# Patient Record
Sex: FEMALE | Race: WHITE | NOT HISPANIC OR LATINO | Employment: FULL TIME | URBAN - METROPOLITAN AREA
[De-identification: names, ages, dates, MRNs, and addresses within clinical notes are randomized per-mention and may not be internally consistent; named-entity substitution may affect disease eponyms.]

---

## 2017-01-27 ENCOUNTER — OFFICE VISIT (OUTPATIENT)
Dept: PEDIATRICS | Facility: CLINIC | Age: 42
End: 2017-01-27
Payer: COMMERCIAL

## 2017-01-27 VITALS
BODY MASS INDEX: 33.97 KG/M2 | OXYGEN SATURATION: 97 % | TEMPERATURE: 98.9 F | RESPIRATION RATE: 16 BRPM | SYSTOLIC BLOOD PRESSURE: 122 MMHG | HEART RATE: 85 BPM | WEIGHT: 228.3 LBS | DIASTOLIC BLOOD PRESSURE: 80 MMHG

## 2017-01-27 DIAGNOSIS — N89.8 VAGINAL DISCHARGE: ICD-10-CM

## 2017-01-27 DIAGNOSIS — B37.31 YEAST VAGINITIS: ICD-10-CM

## 2017-01-27 DIAGNOSIS — G43.709 CHRONIC MIGRAINE WITHOUT AURA WITHOUT STATUS MIGRAINOSUS, NOT INTRACTABLE: ICD-10-CM

## 2017-01-27 DIAGNOSIS — J45.20 INTERMITTENT ASTHMA, UNCOMPLICATED: ICD-10-CM

## 2017-01-27 DIAGNOSIS — F33.1 MODERATE RECURRENT MAJOR DEPRESSION (H): ICD-10-CM

## 2017-01-27 DIAGNOSIS — N89.8 VAGINAL ITCHING: Primary | ICD-10-CM

## 2017-01-27 LAB
MICRO REPORT STATUS: ABNORMAL
SPECIMEN SOURCE: ABNORMAL
WET PREP SPEC: ABNORMAL

## 2017-01-27 PROCEDURE — 99214 OFFICE O/P EST MOD 30 MIN: CPT | Performed by: FAMILY MEDICINE

## 2017-01-27 PROCEDURE — 87210 SMEAR WET MOUNT SALINE/INK: CPT | Performed by: FAMILY MEDICINE

## 2017-01-27 RX ORDER — VENLAFAXINE HYDROCHLORIDE 150 MG/1
150 CAPSULE, EXTENDED RELEASE ORAL DAILY
Qty: 90 CAPSULE | Refills: 1 | Status: SHIPPED | OUTPATIENT
Start: 2017-01-27 | End: 2018-01-05

## 2017-01-27 RX ORDER — FLUCONAZOLE 150 MG/1
150 TABLET ORAL
Qty: 4 TABLET | Refills: 0 | Status: SHIPPED | OUTPATIENT
Start: 2017-01-27 | End: 2018-02-23

## 2017-01-27 RX ORDER — RIZATRIPTAN BENZOATE 10 MG/1
10 TABLET, ORALLY DISINTEGRATING ORAL SEE ADMIN INSTRUCTIONS
Qty: 6 TABLET | Refills: 6 | Status: SHIPPED
Start: 2017-01-27 | End: 2018-02-23

## 2017-01-27 RX ORDER — PROPRANOLOL HCL 60 MG
60 CAPSULE, EXTENDED RELEASE 24HR ORAL DAILY
Qty: 90 CAPSULE | Refills: 1 | Status: SHIPPED | OUTPATIENT
Start: 2017-01-27 | End: 2018-02-23

## 2017-01-27 RX ORDER — ALBUTEROL SULFATE 90 UG/1
AEROSOL, METERED RESPIRATORY (INHALATION)
Qty: 1 INHALER | Refills: 1 | Status: SHIPPED | OUTPATIENT
Start: 2017-01-27 | End: 2018-02-23

## 2017-01-27 ASSESSMENT — PAIN SCALES - GENERAL: PAINLEVEL: NO PAIN (1)

## 2017-01-27 NOTE — PROGRESS NOTES
SUBJECTIVE:                                                    Treasure Pradhan is a 41 year old female who presents to clinic today for the following health issues:      Vaginal Symptoms     Onset: 4 weeks, recurrence x1 week    Description:  Vaginal Discharge: thick and yellowish   Itching (Pruritis): YES  Burning sensation:  YES  Odor: no     Accompanying Signs & Symptoms:  Pain with Urination: YES- sometimes  Abdominal Pain: no - but notes low back feeling achy  Fever: no    History:   Sexually active: YES  New Partner: no   Possibility of Pregnancy:  No    Precipitating factors:   Recent Antibiotic Use: no     Alleviating factors:  Monistat 3 day course took symptoms away and had recurrence so completed 2nd 3 day treatment. Patient c/o onset of symptoms for the 3rd time about 5 days ago     Therapies Tried and outcome: Monistat 3 day x2 (about 1 week apart)      Depression and Anxiety Follow-Up    Status since last visit: No change    Other associated symptoms:None    Complicating factors:     Significant life event: No     Current substance abuse: None    PHQ-9 SCORE 6/3/2015 12/9/2015 7/15/2016   Total Score 9 - -   Total Score - 13 13     NATANAEL-7 SCORE 6/3/2015 12/9/2015 7/15/2016   Total Score 12 - -   Total Score - 7 5        PHQ-9  English      PHQ-9   Any Language     GAD7       Asthma Follow-Up    Was ACT completed today?    Yes    ACT Total Scores 7/15/2016   ACT TOTAL SCORE -   ASTHMA ER VISITS -   ASTHMA HOSPITALIZATIONS -   ACT TOTAL SCORE (Goal Greater than or Equal to 20) 25   In the past 12 months, how many times did you visit the emergency room for your asthma without being admitted to the hospital? 0   In the past 12 months, how many times were you hospitalized overnight because of your asthma? 0       Migraine Follow-Up    Headaches symptoms:  Stable     Frequency: 1-2/month     Duration of headaches: hours- day    Able to do normal daily activities/work with migraines: Yes    Rescue/Relief  medication:Maxalt              Effectiveness: moderate relief    Preventative medication: inderal    Neurologic complications: No new stroke-like symptoms, loss of vision or speech, numbness or weakness    In the past 4 weeks, how often have you gone to Urgent Care or the emergency room because of your headaches?  0       Problem list and histories reviewed & adjusted, as indicated.  Additional history: as documented    Patient Active Problem List   Diagnosis     Migraine     Depression with anxiety     Asthma, intermittent     HYPERLIPIDEMIA LDL GOAL <160     Anal pain     Back pain     Moderate recurrent major depression (H)     Generalized anxiety disorder     Obesity (BMI 30-39.9)     Dyslipidemia     Onychomycosis     Therapeutic drug monitoring     Degeneration of thoracic or thoracolumbar intervertebral disc     DDD (degenerative disc disease), cervical     Migraine without aura and without status migrainosus, not intractable     Encounter for surveillance of other contraceptive     Pigmented skin lesion, midback, 3mm     Past Surgical History   Procedure Laterality Date     Surgical history of -   2007          Surgical history of -   3/2006     Rt Foot mass - Perineurioma       Social History   Substance Use Topics     Smoking status: Never Smoker      Smokeless tobacco: Never Used     Alcohol Use: Yes      Comment: 3-4 drinks per month     Family History   Problem Relation Age of Onset     HEART DISEASE Father      Prostate Cancer Father      DIABETES Maternal Grandfather      HEART DISEASE Paternal Grandfather      Genetic Disorder Daughter      At Birth/ [place]         Current Outpatient Prescriptions   Medication Sig Dispense Refill     fluconazole (DIFLUCAN) 150 MG tablet Take 1 tablet (150 mg) by mouth every 3 days 4 tablet 0     venlafaxine (EFFEXOR-XR) 150 MG 24 hr capsule Take 1 capsule (150 mg) by mouth daily 90 capsule 1     propranolol (INDERAL LA) 60 MG 24 hr capsule Take 1  capsule (60 mg) by mouth daily 90 capsule 1     rizatriptan (MAXALT-MLT) 10 MG ODT tab Take 1 tablet (10 mg) by mouth See Admin Instructions WITH ONSET OF MIGRAINE, MAY REPEAT ONCE AFTER 2 HOURS. DO NOT EXCEED 3 TABLETS IN 24 HOURS. 6 tablet 6     albuterol (ALBUTEROL) 108 (90 BASE) MCG/ACT Inhaler 2 puffs every 4 hours as needed. 1 Inhaler 1     etonogestrel-ethinyl estradiol (NUVARING) 0.12-0.015 MG/24HR vaginal ring PLACE 1 RING VAGINALLY EVERY 21 DAYS THEN REMOVE FOR 1 WEEK. THEN REPEAT. 3 each 3     cyclobenzaprine (FLEXERIL) 10 MG tablet Take 1 tablet (10 mg) by mouth 2 times daily as needed for muscle spasms 30 tablet 1     fluticasone (FLONASE) 50 MCG/ACT nasal spray Spray 2 sprays into both nostrils daily 48 g 3     nystatin (MYCOSTATIN) 476293 UNIT/GM POWD Use to squirt inside the socks once daily 60 g 3     [DISCONTINUED] venlafaxine (EFFEXOR-XR) 150 MG 24 hr capsule Take 1 capsule (150 mg) by mouth daily 90 capsule 3     [DISCONTINUED] propranolol (INDERAL LA) 60 MG capsule Take 1 capsule (60 mg) by mouth daily 90 capsule 3     [DISCONTINUED] albuterol (ALBUTEROL) 108 (90 BASE) MCG/ACT inhaler 2 puffs every 4 hours as needed. 1 Inhaler 1     Allergies   Allergen Reactions     Terbinafine Itching     Recent Labs   Lab Test  03/03/14   1004  04/24/12   1101  01/03/11   1011   LDL  146*  145*   --    HDL  46*  43*   --    TRIG  96  63   --    ALT  33   --    --    CR   --   0.69   --    GFRESTIMATED   --   >90   --    GFRESTBLACK   --   >90   --    POTASSIUM   --   4.1   --    TSH   --   0.64  1.21      BP Readings from Last 3 Encounters:   01/27/17 122/80   07/15/16 130/80   12/09/15 129/89    Wt Readings from Last 3 Encounters:   01/27/17 228 lb 4.8 oz (103.556 kg)   07/15/16 225 lb 14.4 oz (102.468 kg)   12/09/15 237 lb 11.2 oz (107.82 kg)                  Labs reviewed in EPIC  Problem list, Medication list, Allergies, and Medical/Social/Surgical histories reviewed in EPIC and updated as  appropriate.    ROS:  C: NEGATIVE for fever, chills, change in weight  INTEGUMENTARY/SKIN: NEGATIVE for worrisome rashes, moles or lesions  EYES: NEGATIVE for vision changes or irritation  R: NEGATIVE for significant cough or SOB  RESP:Hx asthma  CV: NEGATIVE for chest pain, palpitations or peripheral edema  GI: NEGATIVE for nausea, abdominal pain, heartburn, or change in bowel habits  : as above  MUSCULOSKELETAL: NEGATIVE for significant arthralgias or myalgia  NEURO: NEGATIVE for weakness, dizziness or paresthesias and Hx headaches-migraine  ENDOCRINE: NEGATIVE for temperature intolerance, skin/hair changes  HEME/ALLERGY/IMMUNE: NEGATIVE for bleeding problems  PSYCHIATRIC: NEGATIVE for changes in mood or affect and HX depression    OBJECTIVE:                                                    /80 mmHg  Pulse 85  Temp(Src) 98.9  F (37.2  C) (Oral)  Resp 16  Wt 228 lb 4.8 oz (103.556 kg)  SpO2 97%  LMP 01/19/2017  Body mass index is 33.97 kg/(m^2).  GENERAL: healthy, alert and no distress  NECK: no adenopathy, no asymmetry, masses, or scars and thyroid normal to palpation  RESP: lungs clear to auscultation - no rales, rhonchi or wheezes  CV: regular rate and rhythm, normal S1 S2, no S3 or S4, no murmur, click or rub, no peripheral edema and peripheral pulses strong  ABDOMEN: soft, nontender, no hepatosplenomegaly, no masses and bowel sounds normal   (female): normal female external genitalia, normal urethral meatus , vaginal mucosa pink, moist, well rugated, vaginal discharge - moderate and white and normal cervix, adnexae, and uterus without masses.  MS: no gross musculoskeletal defects noted, no edema  NEURO: Normal strength and tone, mentation intact and speech normal  PSYCH: mentation appears normal, affect normal/bright    Diagnostic Test Results:  Results for orders placed or performed in visit on 01/27/17 (from the past 24 hour(s))   Wet prep   Result Value Ref Range    Specimen Description  "Vagina     Wet Prep (A)      No Trichomonas seen  No clue cells seen  Yeast seen      Micro Report Status FINAL 01/27/2017         ASSESSMENT/PLAN:                                                    Depression; recurrent episode-- Moderate   Associated with the following complications:    None   Plan:  No changes in the patient's current treatment plan    Asthma: Intermittent   Plan:  No changes in the patient's current treatment plan    Migraine: controlled   Plan:  No changes in the patient's current treatment plan      BMI:   Estimated body mass index is 33.97 kg/(m^2) as calculated from the following:    Height as of 7/15/16: 5' 8.75\" (1.746 m).    Weight as of this encounter: 228 lb 4.8 oz (103.556 kg).   Weight management plan: Discussed healthy diet and exercise guidelines and patient will follow up in 6 months in clinic to re-evaluate.      1. Vaginal itching    - Wet prep    2. Vaginal discharge    - Wet prep    3. Yeast vaginitis  Results for orders placed or performed in visit on 01/27/17   Wet prep   Result Value Ref Range    Specimen Description Vagina     Wet Prep (A)      No Trichomonas seen  No clue cells seen  Yeast seen      Micro Report Status FINAL 01/27/2017      -Test results reviewed with patient  Given the recent recurrences, recommended to take Diflucan 150 mg every 3 days for 4 doses  Reviewed local hygiene  Follow-up if no better in 2-3 weeks or sooner if needed  - fluconazole (DIFLUCAN) 150 MG tablet; Take 1 tablet (150 mg) by mouth every 3 days  Dispense: 4 tablet; Refill: 0    4. Moderate recurrent major depression (H)  Stable, continue Effexor 150 mg daily, recheck in 6 months at the time of physical  - venlafaxine (EFFEXOR-XR) 150 MG 24 hr capsule; Take 1 capsule (150 mg) by mouth daily  Dispense: 90 capsule; Refill: 1    5. Chronic migraine without aura without status migrainosus, not intractable  Stable, continue Maxalt p.r.n. For episodic migraine, continue propranolol 60 mm daily " for prophylaxis, recheck in 6 months or sooner if needed  - propranolol (INDERAL LA) 60 MG 24 hr capsule; Take 1 capsule (60 mg) by mouth daily  Dispense: 90 capsule; Refill: 1  - rizatriptan (MAXALT-MLT) 10 MG ODT tab; Take 1 tablet (10 mg) by mouth See Admin Instructions WITH ONSET OF MIGRAINE, MAY REPEAT ONCE AFTER 2 HOURS. DO NOT EXCEED 3 TABLETS IN 24 HOURS.  Dispense: 6 tablet; Refill: 6    6. Intermittent asthma, uncomplicated   stable, continue albuterol inhaler p.r.n.  - albuterol (ALBUTEROL) 108 (90 BASE) MCG/ACT Inhaler; 2 puffs every 4 hours as needed.  Dispense: 1 Inhaler; Refill: 1    Work on weight loss  Regular exercise  Chart documentation done in part with Dragon Voice recognition Software. Although reviewed after completion, some word and grammatical error may remain.    See Patient Instructions    Nelida Celestin MD  [facility]

## 2017-01-27 NOTE — NURSING NOTE
"Chief Complaint   Patient presents with     Vaginal Problem       Initial /90 mmHg  Pulse 85  Temp(Src) 98.9  F (37.2  C) (Oral)  Resp 16  Wt 228 lb 4.8 oz (103.556 kg)  SpO2 97%  LMP 01/19/2017 Estimated body mass index is 33.97 kg/(m^2) as calculated from the following:    Height as of 7/15/16: 5' 8.75\" (1.746 m).    Weight as of this encounter: 228 lb 4.8 oz (103.556 kg).  BP completed using cuff size: vinod Valdovinos, CMA      "

## 2017-01-27 NOTE — MR AVS SNAPSHOT
After Visit Summary   1/27/2017    Treasure Pradhan    MRN: [id number]           Patient Information     Date Of Birth          1975        Visit Information        Provider Department      1/27/2017 2:20 PM Nelida Celestin MD [facility]        Today's Diagnoses     Vaginal itching    -  1     Vaginal discharge         Yeast vaginitis         Moderate recurrent major depression (H)         Chronic migraine without aura without status migrainosus, not intractable         Intermittent asthma, uncomplicated           Care Instructions    Schedule for physical, fasting labs in 6 months  Take diflucan for four doses        Follow-ups after your visit        Who to contact     If you have questions or need follow up information about today's clinic visit or your schedule please contact [facility] directly at [phone].  Normal or non-critical lab and imaging results will be communicated to you by MyChart, letter or phone within 4 business days after the clinic has received the results. If you do not hear from us within 7 days, please contact the clinic through MyChart or phone. If you have a critical or abnormal lab result, we will notify you by phone as soon as possible.  Submit refill requests through J[employer]a or call your pharmacy and they will forward the refill request to us. Please allow 3 business days for your refill to be completed.          Additional Information About Your Visit        MyChart Information     Joota is an electronic gateway that provides easy, online access to your medical records. With Joota, you can request a clinic appointment, read your test results, renew a prescription or communicate with your care team.     To sign up for Joota visit the website at [email or web address]   You will be asked to enter the access code listed below, as well as some personal information. Please follow the directions to create your  username and password.     Your access code is: [email or web address]  Expires: 2017  2:41 PM     Your access code will  in 90 days. If you need help or a new code, please contact your [facility] or call [phone] for assistance.        Care EveryWhere ID     This is your Care EveryWhere ID. This could be used by other organizations to access your [place] medical records  [id number]        Your Vitals Were     Pulse Temperature Respirations Pulse Oximetry Last Period       85 98.9  F (37.2  C) (Oral) 16 97% 2017        Blood Pressure from Last 3 Encounters:   17 122/80   07/15/16 130/80   12/09/15 129/89    Weight from Last 3 Encounters:   17 228 lb 4.8 oz (103.556 kg)   07/15/16 225 lb 14.4 oz (102.468 kg)   12/09/15 237 lb 11.2 oz (107.82 kg)              We Performed the Following     Wet prep          Today's Medication Changes          These changes are accurate as of: 17  2:41 PM.  If you have any questions, ask your nurse or doctor.               Start taking these medicines.        Dose/Directions    fluconazole 150 MG tablet   Commonly known as:  DIFLUCAN   Used for:  Yeast vaginitis   Started by:  Nelida Celestin MD        Dose:  150 mg   Take 1 tablet (150 mg) by mouth every 3 days   Quantity:  4 tablet   Refills:  0            Where to get your medicines      These medications [id number] [address] [facility]  [address] [address]     Phone:  [phone]    - albuterol 108 (90 BASE) MCG/ACT Inhaler  - fluconazole 150 MG tablet  - propranolol 60 MG 24 hr capsule  - rizatriptan 10 MG ODT tab  - venlafaxine 150 MG 24 hr capsule             Primary Care Provider Office Phone # Fax #    Nelida Celestin [phone]       [facility] [address]  [facility] [address]        Thank you!     Thank you  for choosing [facility]  for your care. Our goal is always to provide you with excellent care. Hearing back from our patients is one way we can continue to improve our services. Please take a few minutes to complete the written survey that you may receive in the mail after your visit with us. Thank you!             Your Updated Medication List - Protect others around you: Learn how to safely use, store and throw away your medicines at www.disposemymeds.org.          This list is accurate as of: 1/27/17  2:41 PM.  Always use your most recent med list.                   Brand Name Dispense Instructions for use    albuterol 108 (90 BASE) MCG/ACT Inhaler    albuterol    1 Inhaler    2 puffs every 4 hours as needed.       cyclobenzaprine 10 MG tablet    FLEXERIL    30 tablet    Take 1 tablet (10 mg) by mouth 2 times daily as needed for muscle spasms       etonogestrel-ethinyl estradiol 0.12-0.015 MG/24HR vaginal ring    NUVARING    3 each    PLACE 1 RING VAGINALLY EVERY 21 DAYS THEN REMOVE FOR 1 WEEK. THEN REPEAT.       fluconazole 150 MG tablet    DIFLUCAN    4 tablet    Take 1 tablet (150 mg) by mouth every 3 days       fluticasone 50 MCG/ACT spray    FLONASE    48 g    Spray 2 sprays into both nostrils daily       nystatin 182778 UNIT/GM Powd    MYCOSTATIN    60 g    Use to squirt inside the socks once daily       propranolol 60 MG 24 hr capsule    INDERAL LA    90 capsule    Take 1 capsule (60 mg) by mouth daily       rizatriptan 10 MG ODT tab    MAXALT-MLT    6 tablet    Take 1 tablet (10 mg) by mouth See Admin Instructions WITH ONSET OF MIGRAINE, MAY REPEAT ONCE AFTER 2 HOURS. DO NOT EXCEED 3 TABLETS IN 24 HOURS.       venlafaxine 150 MG 24 hr capsule    EFFEXOR-XR    90 capsule    Take 1 capsule (150 mg) by mouth daily

## 2017-01-28 ASSESSMENT — ASTHMA QUESTIONNAIRES: ACT_TOTALSCORE: 23

## 2017-05-08 DIAGNOSIS — B37.31 YEAST VAGINITIS: ICD-10-CM

## 2017-05-08 NOTE — LETTER
May 17, 2017      Treasure Pradhan  [address]  [place] [place] [facility] [address]              Dear Treasure,    We were notified by your pharmacy that you needed a refill of your Diflucan (fluconazole).  We have attempted to contact you regarding the need for this medication.  It looks like it was originally prescribed on 1/27/17 and we didn't know if it was an automatic refill requested from the pharmacy or if you were having symptoms again.        If you still need the medication filled please call us at [phone] and ask to speak with a nurse.          Sincerely,      Nelida Celestin MD/bco

## 2017-05-08 NOTE — TELEPHONE ENCOUNTER
Called patient on both home and cell.  Left message to call back to speak to nurse regarding this request.  Need to triage reason for request.    This was prescribed for Yeast vaginitis on 1/27/17.    Gale Mathur RN, [facility]

## 2017-05-10 NOTE — TELEPHONE ENCOUNTER
2nd attempt:    Left message for patient to return call to clinic and ask to speak with RN.    Princess Dubois RN,   [facility]

## 2017-05-15 NOTE — TELEPHONE ENCOUNTER
3rd Attempt.    Called patient, left message with phone number to call back.       Will send letter if no call back.    Gale Mathur RN, [facility]

## 2017-05-17 RX ORDER — FLUCONAZOLE 150 MG/1
TABLET ORAL
Qty: 4 TABLET | Refills: 0 | OUTPATIENT
Start: 2017-05-17

## 2017-07-12 DIAGNOSIS — Z30.49 ENCOUNTER FOR SURVEILLANCE OF OTHER CONTRACEPTIVE: ICD-10-CM

## 2017-07-14 RX ORDER — ETONOGESTREL/ETHINYL ESTRADIOL .12-.015MG
RING, VAGINAL VAGINAL
Qty: 3 EACH | Refills: 1 | Status: SHIPPED | OUTPATIENT
Start: 2017-07-14 | End: 2018-01-05

## 2017-07-14 NOTE — TELEPHONE ENCOUNTER
NUVARING 0.12-0.015 MG/24HR vaginal ring  Last Written Prescription Date: 7/15/2016  Last Fill Quantity: 3 each, # refills: 3  Last Office Visit with [facility] or [facility] prescribing provider: 1/27/2017       BP Readings from Last 3 Encounters:   01/27/17 122/80   07/15/16 130/80   12/09/15 129/89     Date of last Breast Exam: 4/26/2010    Refilled per [facility] protocol.    Lucie Del Toro RN

## 2018-01-05 DIAGNOSIS — F33.1 MODERATE RECURRENT MAJOR DEPRESSION (H): ICD-10-CM

## 2018-01-05 DIAGNOSIS — Z30.49 ENCOUNTER FOR SURVEILLANCE OF OTHER CONTRACEPTIVE: ICD-10-CM

## 2018-01-08 RX ORDER — VENLAFAXINE HYDROCHLORIDE 150 MG/1
CAPSULE, EXTENDED RELEASE ORAL
Qty: 90 CAPSULE | Refills: 0 | Status: SHIPPED | OUTPATIENT
Start: 2018-01-08 | End: 2018-02-23

## 2018-01-08 RX ORDER — ETONOGESTREL/ETHINYL ESTRADIOL .12-.015MG
RING, VAGINAL VAGINAL
Qty: 3 EACH | Refills: 0 | Status: SHIPPED | OUTPATIENT
Start: 2018-01-08 | End: 2018-02-23

## 2018-01-08 NOTE — TELEPHONE ENCOUNTER
NUVARING 0.12-0.015 MG/24HR vaginal ring  Last Written Prescription Date:  7/14/2017  Last Fill Quantity: 3,  # refills: 1   Last Office Visit with [facility], [facility] or [facility] prescribing provider:  1/27/2017   Future Office Visit:    Next 5 appointments (look out 90 days)     Feb 23, 2018  1:30 PM CST   PHYSICAL with Nelida Celestin MD   [facility]note 3[facility]    [address] [place] [id number]   [phone]                   venlafaxine (EFFEXOR-XR) 150 MG 24 hr capsule  Last Written Prescription Date:  1/27/2017  Last Fill Quantity: 90,  # refills: 1   Last Office Visit with [facility], [facility] or [facility] prescribing provider:  1/27/2017   Future Office Visit:    Next 5 appointments (look out 90 days)     Feb 23, 2018  1:30 PM CST   PHYSICAL with Nelida Celestin MD   [facility][note 3])    [address] [place] [id number]   [phone]                 PHQ-9 score:    PHQ-9 SCORE 7/15/2016   Total Score -   Total Score 13       Vicky refill. Patient currently scheduled for annual physical on 2/23/2018. Lucie Del Toro RN

## 2018-02-12 DIAGNOSIS — Z13.29 SCREENING FOR THYROID DISORDER: ICD-10-CM

## 2018-02-12 DIAGNOSIS — Z13.1 SCREENING FOR DIABETES MELLITUS (DM): ICD-10-CM

## 2018-02-12 DIAGNOSIS — Z00.00 ROUTINE GENERAL MEDICAL EXAMINATION AT A HEALTH CARE FACILITY: Primary | ICD-10-CM

## 2018-02-12 DIAGNOSIS — Z13.0 SCREENING FOR DEFICIENCY ANEMIA: ICD-10-CM

## 2018-02-12 DIAGNOSIS — Z13.6 CARDIOVASCULAR SCREENING; LDL GOAL LESS THAN 160: ICD-10-CM

## 2018-02-23 ENCOUNTER — OFFICE VISIT (OUTPATIENT)
Dept: PEDIATRICS | Facility: CLINIC | Age: 43
End: 2018-02-23
Payer: COMMERCIAL

## 2018-02-23 VITALS
TEMPERATURE: 98.2 F | HEART RATE: 76 BPM | OXYGEN SATURATION: 98 % | HEIGHT: 69 IN | SYSTOLIC BLOOD PRESSURE: 114 MMHG | WEIGHT: 202.8 LBS | BODY MASS INDEX: 30.04 KG/M2 | DIASTOLIC BLOOD PRESSURE: 76 MMHG

## 2018-02-23 DIAGNOSIS — L98.9 SKIN LESION: ICD-10-CM

## 2018-02-23 DIAGNOSIS — J30.89 CHRONIC NONSEASONAL ALLERGIC RHINITIS DUE TO OTHER ALLERGEN: ICD-10-CM

## 2018-02-23 DIAGNOSIS — G43.709 CHRONIC MIGRAINE WITHOUT AURA WITHOUT STATUS MIGRAINOSUS, NOT INTRACTABLE: ICD-10-CM

## 2018-02-23 DIAGNOSIS — M62.830 BACK MUSCLE SPASM: ICD-10-CM

## 2018-02-23 DIAGNOSIS — J45.20 INTERMITTENT ASTHMA, UNCOMPLICATED: ICD-10-CM

## 2018-02-23 DIAGNOSIS — F33.1 MODERATE RECURRENT MAJOR DEPRESSION (H): ICD-10-CM

## 2018-02-23 DIAGNOSIS — B35.1 ONYCHOMYCOSIS: ICD-10-CM

## 2018-02-23 DIAGNOSIS — Z12.4 SCREENING FOR CERVICAL CANCER: ICD-10-CM

## 2018-02-23 DIAGNOSIS — Z00.00 ROUTINE GENERAL MEDICAL EXAMINATION AT A HEALTH CARE FACILITY: Primary | ICD-10-CM

## 2018-02-23 DIAGNOSIS — L81.9 PIGMENTED SKIN LESION: ICD-10-CM

## 2018-02-23 DIAGNOSIS — Z30.49 ENCOUNTER FOR SURVEILLANCE OF OTHER CONTRACEPTIVE: ICD-10-CM

## 2018-02-23 DIAGNOSIS — R06.83 SNORING: ICD-10-CM

## 2018-02-23 DIAGNOSIS — E66.9 OBESITY (BMI 30-39.9): ICD-10-CM

## 2018-02-23 PROCEDURE — G0145 SCR C/V CYTO,THINLAYER,RESCR: HCPCS | Performed by: FAMILY MEDICINE

## 2018-02-23 PROCEDURE — 87624 HPV HI-RISK TYP POOLED RSLT: CPT | Performed by: FAMILY MEDICINE

## 2018-02-23 PROCEDURE — 99396 PREV VISIT EST AGE 40-64: CPT | Performed by: FAMILY MEDICINE

## 2018-02-23 RX ORDER — NYSTATIN 100000 [USP'U]/G
POWDER TOPICAL
Qty: 60 G | Refills: 3 | Status: SHIPPED | OUTPATIENT
Start: 2018-02-23

## 2018-02-23 RX ORDER — VENLAFAXINE HYDROCHLORIDE 150 MG/1
CAPSULE, EXTENDED RELEASE ORAL
Qty: 90 CAPSULE | Refills: 2 | Status: SHIPPED | OUTPATIENT
Start: 2018-02-23 | End: 2019-01-02

## 2018-02-23 RX ORDER — ETONOGESTREL AND ETHINYL ESTRADIOL VAGINAL RING .015; .12 MG/D; MG/D
RING VAGINAL
Qty: 3 EACH | Refills: 4 | Status: SHIPPED | OUTPATIENT
Start: 2018-02-23 | End: 2019-03-08

## 2018-02-23 RX ORDER — PROPRANOLOL HCL 60 MG
60 CAPSULE, EXTENDED RELEASE 24HR ORAL DAILY
Qty: 90 CAPSULE | Refills: 2 | Status: SHIPPED | OUTPATIENT
Start: 2018-02-23 | End: 2019-03-08

## 2018-02-23 RX ORDER — CYCLOBENZAPRINE HCL 10 MG
10 TABLET ORAL 2 TIMES DAILY PRN
Qty: 30 TABLET | Refills: 1 | Status: SHIPPED | OUTPATIENT
Start: 2018-02-23 | End: 2020-09-22

## 2018-02-23 RX ORDER — FLUTICASONE PROPIONATE 50 MCG
2 SPRAY, SUSPENSION (ML) NASAL DAILY
Qty: 48 G | Refills: 3 | Status: SHIPPED | OUTPATIENT
Start: 2018-02-23 | End: 2019-03-08 | Stop reason: ALTCHOICE

## 2018-02-23 RX ORDER — ALBUTEROL SULFATE 90 UG/1
AEROSOL, METERED RESPIRATORY (INHALATION)
Qty: 1 INHALER | Refills: 1 | Status: SHIPPED | OUTPATIENT
Start: 2018-02-23 | End: 2019-11-20

## 2018-02-23 RX ORDER — RIZATRIPTAN BENZOATE 10 MG/1
10 TABLET, ORALLY DISINTEGRATING ORAL SEE ADMIN INSTRUCTIONS
Qty: 6 TABLET | Refills: 6 | Status: SHIPPED | OUTPATIENT
Start: 2018-02-23 | End: 2019-03-08

## 2018-02-23 ASSESSMENT — ANXIETY QUESTIONNAIRES
1. FEELING NERVOUS, ANXIOUS, OR ON EDGE: NEARLY EVERY DAY
5. BEING SO RESTLESS THAT IT IS HARD TO SIT STILL: MORE THAN HALF THE DAYS
GAD7 TOTAL SCORE: 17
3. WORRYING TOO MUCH ABOUT DIFFERENT THINGS: MORE THAN HALF THE DAYS
7. FEELING AFRAID AS IF SOMETHING AWFUL MIGHT HAPPEN: MORE THAN HALF THE DAYS
6. BECOMING EASILY ANNOYED OR IRRITABLE: NEARLY EVERY DAY
2. NOT BEING ABLE TO STOP OR CONTROL WORRYING: MORE THAN HALF THE DAYS

## 2018-02-23 ASSESSMENT — PATIENT HEALTH QUESTIONNAIRE - PHQ9: 5. POOR APPETITE OR OVEREATING: NEARLY EVERY DAY

## 2018-02-23 ASSESSMENT — PAIN SCALES - GENERAL: PAINLEVEL: MODERATE PAIN (4)

## 2018-02-23 NOTE — PATIENT INSTRUCTIONS
Schedule for fasting and mammogram   Schedule for recheck in 6 months      Preventive Health Recommendations  Female Ages 40 to 49    Yearly exam:     See your health care provider every year in order to  1. Review health changes.   2. Discuss preventive care.    3. Review your medicines if your doctor prescribed any.      Get a Pap test every three years (unless you have an abnormal result and your provider advises testing more often).      If you get Pap tests with HPV test, you only need to test every 5 years, unless you have an abnormal result. You do not need a Pap test if your uterus was removed (hysterectomy) and you have not had cancer.      You should be tested each year for STDs (sexually transmitted diseases), if you're at risk.       Ask your doctor if you should have a mammogram.      Have a colonoscopy (test for colon cancer) if someone in your family has had colon cancer or polyps before age 50.       Have a cholesterol test every 5 years.       Have a diabetes test (fasting glucose) after age 45. If you are at risk for diabetes, you should have this test every 3 years.    Shots: Get a flu shot each year. Get a tetanus shot every 10 years.     Nutrition:     Eat at least 5 servings of fruits and vegetables each day.    Eat whole-grain bread, whole-wheat pasta and brown rice instead of white grains and rice.    Talk to your provider about Calcium and Vitamin D.     Lifestyle    Exercise at least 150 minutes a week (an average of 30 minutes a day, 5 days a week). This will help you control your weight and prevent disease.    Limit alcohol to one drink per day.    No smoking.     Wear sunscreen to prevent skin cancer.    See your dentist every six months for an exam and cleaning.

## 2018-02-23 NOTE — NURSING NOTE
"Chief Complaint   Patient presents with     Physical     Derm Problem     changing moles on back     Recheck Medication     propranolol (INDERAL LA) 60 MG 24 hr capsule       Initial /76 (BP Location: Right arm, Patient Position: Sitting, Cuff Size: Adult Large)  Pulse 76  Temp 98.2  F (36.8  C) (Oral)  Ht 5' 8.75\" (1.746 m)  Wt 202 lb 12.8 oz (92 kg)  SpO2 98%  BMI 30.17 kg/m2 Estimated body mass index is 30.17 kg/(m^2) as calculated from the following:    Height as of this encounter: 5' 8.75\" (1.746 m).    Weight as of this encounter: 202 lb 12.8 oz (92 kg).  Medication Reconciliation: complete  Jazzy Valdovinos, CMA    "

## 2018-02-23 NOTE — LETTER
My Asthma Action Plan  Name: Treasure Pradhan   YOB: 1975  Date: 2/23/2018   My doctor: Nelida Celestin   My clinic: [facility]      My Control Medicine: Albuterol        Dose:   My Rescue Medicine: Albuterol        Dose:    My Asthma Severity: intermittent  Avoid your asthma triggers: upper respiratory infections        GREEN ZONE   Good Control    I feel good    No cough or wheeze    Can work, sleep and play without asthma symptoms       Take your asthma control medicine every day.     1. If exercise triggers your asthma, take your rescue medication    15 minutes before exercise or sports, and    During exercise if you have asthma symptoms  2. Spacer to use with inhaler: If you have a spacer, make sure to use it with your inhaler             YELLOW ZONE Getting Worse  I have ANY of these:    I do not feel good    Cough or wheeze    Chest feels tight    Wake up at night   1. Keep taking your Green Zone medications  2. Start taking your rescue medicine:    every 20 minutes for up to 1 hour. Then every 4 hours for 24-48 hours.  3. If you stay in the Yellow Zone for more than 12-24 hours, contact your doctor.  4. If you do not return to the Green Zone in 12-24 hours or you get worse, start taking your oral steroid medicine if prescribed by your provider.           RED ZONE Medical Alert - Get Help  I have ANY of these:    I feel awful    Medicine is not helping    Breathing getting harder    Trouble walking or talking    Nose opens wide to breathe       1. Take your rescue medicine NOW  2. If your provider has prescribed an oral steroid medicine, start taking it NOW  3. Call your doctor NOW  4. If you are still in the Red Zone after 20 minutes and you have not reached your doctor:    Take your rescue medicine again and    Call 911 or go to the emergency room right away    See your regular doctor within 2 weeks of an Emergency Room or Urgent Care visit for follow-up treatment.         The above medication may be given at school or day care?: N/A (Adult Patient)  Child can carry and use inhaler(s) at school with approval of school nurse?: N/A (Adult Patient)    Electronically signed by: Nelida Celestin, February 23, 2018    Annual Reminders:  Meet with Asthma Educator,  Flu Shot in the Fall, consider Pneumonia Vaccination for patients with asthma (aged 19 and older).    Pharmacy: [facility] [address] [place] [address], [place] - [address] [facility] [address] [facility]                    Asthma Triggers  How To Control Things That Make Your Asthma Worse    Triggers are things that make your asthma worse.  Look at the list below to help you find your triggers and what you can do about them.  You can help prevent asthma flare-ups by staying away from your triggers.      Trigger                                                          What you can do   Cigarette Smoke  Tobacco smoke can make asthma worse. Do not allow smoking in your home, car or around you.  Be sure no one smokes at a child s day care or school.  If you smoke, ask your health care provider for ways to help you quit.  Ask family members to quit too.  Ask your health care provider for a referral to Quit Plan to help you quit smoking, or call 1-349-191-PLAN.     Colds, Flu, Bronchitis  These are common triggers of asthma. Wash your hands often.  Don t touch your eyes, nose or mouth.  Get a flu shot every year.     Dust Mites  These are tiny bugs that live in cloth or carpet. They are too small to see. Wash sheets and blankets in hot water every week.   Encase pillows and mattress in dust mite proof covers.  Avoid having carpet if you can. If you have carpet, vacuum weekly.   Use a dust mask and HEPA vacuum.   Pollen and Outdoor Mold  Some people are allergic to trees, grass, or weed pollen, or molds. Try to keep your windows closed.  Limit time out doors when pollen count is high.   Ask you health care provider  about taking medicine during allergy season.     Animal Dander  Some people are allergic to skin flakes, urine or saliva from pets with fur or feathers. Keep pets with fur or feathers out of your home.    If you can t keep the pet outdoors, then keep the pet out of your bedroom.  Keep the bedroom door closed.  Keep pets off cloth furniture and away from stuffed toys.     Mice, Rats, and Cockroaches  Some people are allergic to the waste from these pests.   Cover food and garbage.  Clean up spills and food crumbs.  Store grease in the refrigerator.   Keep food out of the bedroom.   Indoor Mold  This can be a trigger if your home has high moisture. Fix leaking faucets, pipes, or other sources of water.   Clean moldy surfaces.  Dehumidify basement if it is damp and smelly.   Smoke, Strong Odors, and Sprays  These can reduce air quality. Stay away from strong odors and sprays, such as perfume, powder, hair spray, paints, smoke incense, paint, cleaning products, candles and new carpet.   Exercise or Sports  Some people with asthma have this trigger. Be active!  Ask your doctor about taking medicine before sports or exercise to prevent symptoms.    Warm up for 5-10 minutes before and after sports or exercise.     Other Triggers of Asthma  Cold air:  Cover your nose and mouth with a scarf.  Sometimes laughing or crying can be a trigger.  Some medicines and food can trigger asthma.

## 2018-02-23 NOTE — LETTER
My Depression Action Plan  Name: Treasure Pradhan   Date of Birth 1975  Date: 2/23/2018    My doctor: Nelida Celestin   My clinic: [address] [address] [phone]          GREEN    ZONE   Good Control    What it looks like:     Things are going generally well. You have normal up s and down s. You may even feel depressed from time to time, but bad moods usually last less than a day.   What you need to do:  1. Continue to care for yourself (see self care plan)  2. Check your depression survival kit and update it as needed  3. Follow your physician s recommendations including any medication.  4. Do not stop taking medication unless you consult with your physician first.           YELLOW         ZONE Getting Worse    What it looks like:     Depression is starting to interfere with your life.     It may be hard to get out of bed; you may be starting to isolate yourself from others.    Symptoms of depression are starting to last most all day and this has happened for several days.     You may have suicidal thoughts but they are not constant.   What you need to do:     1. Call your care team, your response to treatment will improve if you keep your care team informed of your progress. Yellow periods are signs an adjustment may need to be made.     2. Continue your self-care, even if you have to fake it!    3. Talk to someone in your support network    4. Open up your depression survival kit           RED    ZONE Medical Alert - Get Help    What it looks like:     Depression is seriously interfering with your life.     You may experience these or other symptoms: You can t get out of bed most days, can t work or engage in other necessary activities, you have trouble taking care of basic hygiene, or basic responsibilities, thoughts of suicide or death that will not go away, self-injurious behavior.     What you need to do:  1. Call your care team and request  a same-day appointment. If they are not available (weekends or after hours) call your local crisis line, emergency room or 911.      Electronically signed by: Nelida Celestin, February 23, 2018    Depression Self Care Plan / Survival Kit    Self-Care for Depression  Here s the deal. Your body and mind are really not as separate as most people think.  What you do and think affects how you feel and how you feel influences what you do and think. This means if you do things that people who feel good do, it will help you feel better.  Sometimes this is all it takes.  There is also a place for medication and therapy depending on how severe your depression is, so be sure to consult with your medical provider and/ or Behavioral Health Consultant if your symptoms are worsening or not improving.     In order to better manage my stress, I will:    Exercise  Get some form of exercise, every day. This will help reduce pain and release endorphins, the  feel good  chemicals in your brain. This is almost as good as taking antidepressants!  This is not the same as joining a gym and then never going! (they count on that by the way ) It can be as simple as just going for a walk or doing some gardening, anything that will get you moving.      Hygiene   Maintain good hygiene (Get out of bed in the morning, Make your bed, Brush your teeth, Take a shower, and Get dressed like you were going to work, even if you are unemployed).  If your clothes don't fit try to get ones that do.    Diet  I will strive to eat foods that are good for me, drink plenty of water, and avoid excessive sugar, caffeine, alcohol, and other mood-altering substances.  Some foods that are helpful in depression are: complex carbohydrates, B vitamins, flaxseed, fish or fish oil, fresh fruits and vegetables.    Psychotherapy  I agree to participate in Individual Therapy (if recommended).    Medication  If prescribed medications, I agree to take them.  Missing doses  can result in serious side effects.  I understand that drinking alcohol, or other illicit drug use, may cause potential side effects.  I will not stop my medication abruptly without first discussing it with my provider.    Staying Connected With Others  I will stay in touch with my friends, family members, and my primary care provider/team.    Use your imagination  Be creative.  We all have a creative side; it doesn t matter if it s oil painting, sand castles, or mud pies! This will also kick up the endorphins.    Witness Beauty  (AKA stop and smell the roses) Take a look outside, even in mid-winter. Notice colors, textures. Watch the squirrels and birds.     Service to others  Be of service to others.  There is always someone else in need.  By helping others we can  get out of ourselves  and remember the really important things.  This also provides opportunities for practicing all the other parts of the program.    Humor  Laugh and be silly!  Adjust your TV habits for less news and crime-drama and more comedy.    Control your stress  Try breathing deep, massage therapy, biofeedback, and meditation. Find time to relax each day.     My support system    Clinic Contact:  Phone number:    Contact 1:  Phone number:    Contact 2:  Phone number:    [employer]/:  Phone number:    Therapist:  Phone number:    Local crisis center:    Phone number:    Other community support:  Phone number:

## 2018-02-23 NOTE — PROGRESS NOTES
SUBJECTIVE:   CC: Treasure Pradhan is an 42 year old woman who presents for preventive health visit.     Healthy Habits:    Do you get at least three servings of calcium containing foods daily (dairy, green leafy vegetables, etc.)? yes    Amount of exercise or daily activities, outside of work: no current routine    Problems taking medications regularly No    Medication side effects: Yes increased fatigue - thinks related to propranolol    Have you had an eye exam in the past two years? yes    Do you see a dentist twice per year? yes    Do you have sleep apnea, excessive snoring or daytime drowsiness? yes      Depression Followup    Status since last visit: Stable     See PHQ-9 for current symptoms.  Other associated symptoms: None    Complicating factors:   Significant life event:  No   Current substance abuse:  None  Anxiety or Panic symptoms:  No    PHQ-9 12/9/2015 7/15/2016   Total Score 13 13   Q9: Suicide Ideation Not at all Not at all     In the past two weeks have you had thoughts of suicide or self-harm?  No.    Do you have concerns about your personal safety or the safety of others?   No  PHQ-9  English  PHQ-9   Any Language  Suicide Assessment Five-step Evaluation and Treatment (SAFE-T)  Asthma Follow-Up    Was ACT completed today?  No      Respiratory symptoms:   Cough: No   Wheezing: No   Shortness of breath: No    Use of short- acting(rescue) inhaler: rarely    Taking controlled (daily) meds as prescribed: No    ER/UC visits or hospital admissions since last visit: none     Recent asthma triggers that patient is dealing with: None     Migraine Follow-Up    Headaches symptoms:  Stable     Frequency: once in 1-2 months     Duration of headaches: hours    Able to do normal daily activities/work with migraines: Yes    Rescue/Relief medication:Maxalt              Effectiveness: total relief    Preventative medication: inderal    Neurologic complications: No new stroke-like symptoms, loss of vision or  speech, numbness or weakness    In the past 4 weeks, how often have you gone to Urgent Care or the emergency room because of your headaches?  0      Today's PHQ-2 Score:   PHQ-2 (  [employer]) 7/15/2016 2015   Q1: Little interest or pleasure in doing things 1 0   Q2: Feeling down, depressed or hopeless 1 0   PHQ-2 Score 2 0       Abuse: Current or Past(Physical, Sexual or Emotional)- No  Do you feel safe in your environment - Yes    Social History   Substance Use Topics     Smoking status: Never Smoker     Smokeless tobacco: Never Used     Alcohol use Yes      Comment: 3-4 drinks per month     If you drink alcohol do you typically have >3 drinks per day or >7 drinks per week? No                     Reviewed orders with patient.  Reviewed health maintenance and updated orders accordingly - Yes  Labs reviewed in EPIC  BP Readings from Last 3 Encounters:   18 114/76   17 122/80   07/15/16 130/80    Wt Readings from Last 3 Encounters:   18 202 lb 12.8 oz (92 kg)   17 228 lb 4.8 oz (103.6 kg)   07/15/16 225 lb 14.4 oz (102.5 kg)                  Patient Active Problem List   Diagnosis     Migraine     Depression with anxiety     Asthma, intermittent     Anal pain     Back pain     Moderate recurrent major depression (H)     Generalized anxiety disorder     Obesity (BMI 30-39.9)     Dyslipidemia     Onychomycosis     Therapeutic drug monitoring     Degeneration of thoracic or thoracolumbar intervertebral disc     DDD (degenerative disc disease), cervical     Migraine without aura and without status migrainosus, not intractable     Encounter for surveillance of other contraceptive     Pigmented skin lesion, midback, 3mm     Chronic nonseasonal allergic rhinitis due to other allergen     Past Surgical History:   Procedure Laterality Date     SURGICAL HISTORY OF -   2007         SURGICAL HISTORY OF -   3/2006    Rt Foot mass - Perineurioma       Social History   Substance Use  Topics     Smoking status: Never Smoker     Smokeless tobacco: Never Used     Alcohol use Yes      Comment: 3-4 drinks per month     Family History   Problem Relation Age of Onset     HEART DISEASE Father      Prostate Cancer Father      DIABETES Maternal Grandfather      HEART DISEASE Paternal Grandfather      Genetic Disorder Daughter      At Birth/ [place]         Current Outpatient Prescriptions   Medication Sig Dispense Refill     albuterol (PROAIR HFA) 108 (90 BASE) MCG/ACT Inhaler 2 puffs every 4 hours as needed. 1 Inhaler 1     cyclobenzaprine (FLEXERIL) 10 MG tablet Take 1 tablet (10 mg) by mouth 2 times daily as needed for muscle spasms 30 tablet 1     fluticasone (FLONASE) 50 MCG/ACT spray Spray 2 sprays into both nostrils daily 48 g 3     etonogestrel-ethinyl estradiol (NUVARING) 0.12-0.015 MG/24HR vaginal ring PLACE 1 RING VAGINALLY EVERY 21 DAYS THEN REMOVE FOR 1 WEEK AND REPEAT 3 each 4     nystatin (MYCOSTATIN) 431588 UNIT/GM POWD Use to squirt inside the socks once daily 60 g 3     propranolol (INDERAL LA) 60 MG 24 hr capsule Take 1 capsule (60 mg) by mouth daily 90 capsule 2     rizatriptan (MAXALT-MLT) 10 MG ODT tab Take 1 tablet (10 mg) by mouth See Admin Instructions WITH ONSET OF MIGRAINE, MAY REPEAT ONCE AFTER 2 HOURS. DO NOT EXCEED 3 TABLETS IN 24 HOURS. 6 tablet 6     venlafaxine (EFFEXOR-XR) 150 MG 24 hr capsule TAKE 1 CAPSULE(150 MG) BY MOUTH DAILY 90 capsule 2     [DISCONTINUED] NUVARING 0.12-0.015 MG/24HR vaginal ring PLACE 1 RING VAGINALLY EVERY 21 DAYS THEN REMOVE FOR 1 WEEK AND REPEAT 3 each 0     [DISCONTINUED] venlafaxine (EFFEXOR-XR) 150 MG 24 hr capsule TAKE 1 CAPSULE(150 MG) BY MOUTH DAILY 90 capsule 0     [DISCONTINUED] propranolol (INDERAL LA) 60 MG 24 hr capsule Take 1 capsule (60 mg) by mouth daily 90 capsule 1     [DISCONTINUED] albuterol (ALBUTEROL) 108 (90 BASE) MCG/ACT Inhaler 2 puffs every 4 hours as needed. 1 Inhaler 1     Allergies   Allergen Reactions     Terbinafine  Itching     Recent Labs   Lab Test  14   1004  12   1101  11   1011   LDL  146*  145*   --    HDL  46*  43*   --    TRIG  96  63   --    ALT  33   --    --    CR   --   0.69   --    GFRESTIMATED   --   >90   --    GFRESTBLACK   --   >90   --    POTASSIUM   --   4.1   --    TSH   --   0.64  1.21        Patient under age 50, mutual decision reflected in health maintenance.      Pertinent mammograms are reviewed under the imaging tab.  History of abnormal Pap smear: NO - age 30- 65 PAP every 3 years recommended    Reviewed and updated as needed this visit by clinical staff  Tobacco  Allergies  Meds  Med Hx  Surg Hx  Fam Hx  Soc Hx        Reviewed and updated as needed this visit by Provider          Past Medical History:   Diagnosis Date     Allergic rhinitis      Asthma, intermittent      Back pain      Depression with anxiety      Hyperlipidemia LDL goal < 160      Migraine       Past Surgical History:   Procedure Laterality Date     SURGICAL HISTORY OF -   2007         SURGICAL HISTORY OF -   3/2006    Rt Foot mass - Perineurioma     Obstetric History       T2      L2     SAB0   TAB0   Ectopic0   Multiple0   Live Births2       # Outcome Date GA Lbr Presley/2nd Weight Sex Delivery Anes PTL Lv   2 Term      -SEC   INDU   1 Term      -SEC   INDU          ROS:  C: NEGATIVE for fever, chills, change in weight  I: NEGATIVE for worrisome rashes, moles or lesions  E: NEGATIVE for vision changes or irritation  ENT: NEGATIVE for ear, mouth and throat problems  ENT:  chronic allergies  R: NEGATIVE for significant cough or SOB  RESP: History of asthma  B: NEGATIVE for masses, tenderness or discharge  CV: NEGATIVE for chest pain, palpitations or peripheral edema  GI: NEGATIVE for nausea, abdominal pain, heartburn, or change in bowel habits  : NEGATIVE for unusual urinary or vaginal symptoms. Periods are regular.  M: NEGATIVE for significant arthralgias or  "myalgia  N: NEGATIVE for weakness, dizziness or paresthesias  NEURO: History of migraines  E: NEGATIVE for temperature intolerance, skin/hair changes  H: NEGATIVE for bleeding problems  P: NEGATIVE for changes in mood or affect  PSYCHIATRIC: HX depression    OBJECTIVE:   /76 (BP Location: Right arm, Patient Position: Sitting, Cuff Size: Adult Large)  Pulse 76  Temp 98.2  F (36.8  C) (Oral)  Ht 5' 8.75\" (1.746 m)  Wt 202 lb 12.8 oz (92 kg)  SpO2 98%  BMI 30.17 kg/m2  EXAM:  GENERAL: healthy, alert and no distress  EYES: Eyes grossly normal to inspection, PERRL and conjunctivae and sclerae normal  HENT: ear canals and TM's normal, nose and mouth without ulcers or lesions  NECK: no adenopathy, no asymmetry, masses, or scars and thyroid normal to palpation  RESP: lungs clear to auscultation - no rales, rhonchi or wheezes  BREAST: normal without masses, tenderness or nipple discharge and no palpable axillary masses or adenopathy  CV: regular rate and rhythm, normal S1 S2, no S3 or S4, no murmur, click or rub, no peripheral edema and peripheral pulses strong  ABDOMEN: soft, nontender, no hepatosplenomegaly, no masses and bowel sounds normal   (female): normal female external genitalia, normal urethral meatus, vaginal mucosa pink, moist, well rugated, and normal cervix/adnexa/uterus without masses or discharge   (female): nuvaring felt  MS: no gross musculoskeletal defects noted, no edema  SKIN: stable 3mm raised intradermal nevus- mid back  Left thigh- 1mm dark black mole with some bruising underneath  NEURO: Normal strength and tone, mentation intact and speech normal  PSYCH: mentation appears normal, affect normal/bright    ASSESSMENT/PLAN:   1. Routine general medical examination at a health care facility  Discussed on regular exercises, daily calcium intake, healthy eating, self breast exams monthly and routine dental checks  - Pap imaged thin layer screen with HPV - recommended age 30 - 65 years " (select HPV order below)  - HPV High Risk Types DNA Cervical    2. Screening for cervical cancer    - Pap imaged thin layer screen with HPV - recommended age 30 - 65 years (select HPV order below)  - HPV High Risk Types DNA Cervical    3. Intermittent asthma, uncomplicated    - albuterol (PROAIR HFA) 108 (90 BASE) MCG/ACT Inhaler; 2 puffs every 4 hours as needed.  Dispense: 1 Inhaler; Refill: 1  - Asthma Action Plan (AAP)    4. Back muscle spasm    - cyclobenzaprine (FLEXERIL) 10 MG tablet; Take 1 tablet (10 mg) by mouth 2 times daily as needed for muscle spasms  Dispense: 30 tablet; Refill: 1    5. Encounter for surveillance of other contraceptive    - etonogestrel-ethinyl estradiol (NUVARING) 0.12-0.015 MG/24HR vaginal ring; PLACE 1 RING VAGINALLY EVERY 21 DAYS THEN REMOVE FOR 1 WEEK AND REPEAT  Dispense: 3 each; Refill: 4    6. Onychomycosis    - nystatin (MYCOSTATIN) 651154 UNIT/GM POWD; Use to squirt inside the socks once daily  Dispense: 60 g; Refill: 3    7. Chronic migraine without aura without status migrainosus, not intractable  Stable, continue current medications, recheck in 6 months or sooner if needed.  - propranolol (INDERAL LA) 60 MG 24 hr capsule; Take 1 capsule (60 mg) by mouth daily  Dispense: 90 capsule; Refill: 2  - rizatriptan (MAXALT-MLT) 10 MG ODT tab; Take 1 tablet (10 mg) by mouth See Admin Instructions WITH ONSET OF MIGRAINE, MAY REPEAT ONCE AFTER 2 HOURS. DO NOT EXCEED 3 TABLETS IN 24 HOURS.  Dispense: 6 tablet; Refill: 6    8. Moderate recurrent major depression (H)  Stable, continue current medication, recheck in 6 months or sooner if needed.  - venlafaxine (EFFEXOR-XR) 150 MG 24 hr capsule; TAKE 1 CAPSULE(150 MG) BY MOUTH DAILY  Dispense: 90 capsule; Refill: 2  - DEPRESSION ACTION PLAN (DAP)    9. Chronic nonseasonal allergic rhinitis due to other allergen    - fluticasone (FLONASE) 50 MCG/ACT spray; Spray 2 sprays into both nostrils daily  Dispense: 48 g; Refill: 3    10.  Snoring  Associated with some daytime sleepiness.  Patient noted improving symptoms and she lost weight in the past 6 months.  We will consider sleep study to rule out sleep apnea At her next visit in 6 months  for persistent or worsening concerns  Patient verbalised understanding and is agreeable to the plan.  11. Pigmented skin lesion, midback, 3mm  Stable, continue to monitor, continue sunscreen use.   Reviewed the ABCD's of pigmented nevi, emphasized on sunscreen use, return to clinic for concerns.      12. Obesity (BMI 30-39.9)  Wt Readings from Last 5 Encounters:   02/23/18 202 lb 12.8 oz (92 kg)   01/27/17 228 lb 4.8 oz (103.6 kg)   07/15/16 225 lb 14.4 oz (102.5 kg)   12/09/15 237 lb 11.2 oz (107.8 kg)   06/26/15 228 lb (103.4 kg)       (L98.9) Skin lesion, left thigh  Comment: 1mm dark mole with bruising underneath  Plan: considered  dermatology consult, patient does not think she needs it at this point   she will monitor for now,.  Call for a dermatology consult or follow-up with us  for persistent or worsening concerns   Reviewed the ABCD's of pigmented nevi, emphasized on sunscreen use, return to clinic for concerns.        Appreciated patient's efforts on healthy eating, regular exercises, portion control, will continue those efforts.      COUNSELING:   Reviewed preventive health counseling, as reflected in patient instructions  Special attention given to:        Regular exercise       Healthy diet/nutrition       Vision screening       Contraception       Family planning       Folic Acid Counseling       (Michelle)menopause management       The ASCVD Risk score (Romain JOSELIN Jr, et al., 2013) failed to calculate for the following reasons:    Cannot find a previous HDL lab    Cannot find a previous total cholesterol lab       Advance Care Planning         reports that she has never smoked. She has never used smokeless tobacco.    Estimated body mass index is 30.17 kg/(m^2) as calculated from the following:     "Height as of this encounter: 5' 8.75\" (1.746 m).    Weight as of this encounter: 202 lb 12.8 oz (92 kg).   Weight management plan: Discussed healthy diet and exercise guidelines and patient will follow up in 6 months in clinic to re-evaluate.    Counseling Resources:  ATP IV Guidelines  Pooled Cohorts Equation Calculator  Breast Cancer Risk Calculator  FRAX Risk Assessment  ICSI Preventive Guidelines  Dietary Guidelines for Americans, 2010  USDA's MyPlate  ASA Prophylaxis  Lung CA Screening    Nelida Celestin MD  [facility]  Chart documentation done in part with Dragon Voice recognition Software. Although reviewed after completion, some word and grammatical error may remain.    "

## 2018-02-23 NOTE — MR AVS SNAPSHOT
After Visit Summary   2/23/2018    Treasure Pradhan    MRN: [id number]           Patient Information     Date Of Birth          1975        Visit Information        Provider Department      2/23/2018 1:30 PM Nelida Celestin MD [facility]        Today's Diagnoses     Chronic nonseasonal allergic rhinitis due to other allergen    -  1    Intermittent asthma, uncomplicated        Back muscle spasm        Chronic rhinitis        Encounter for surveillance of other contraceptive        Onychomycosis        Chronic migraine without aura without status migrainosus, not intractable        Moderate recurrent major depression (H)        Screening for cervical cancer        Routine general medical examination at a health care facility        Snoring          Care Instructions    Schedule for fasting and mammogram   Schedule for recheck in 6 months      Preventive Health Recommendations  Female Ages 40 to 49    Yearly exam:     See your health care provider every year in order to  1. Review health changes.   2. Discuss preventive care.    3. Review your medicines if your doctor prescribed any.      Get a Pap test every three years (unless you have an abnormal result and your provider advises testing more often).      If you get Pap tests with HPV test, you only need to test every 5 years, unless you have an abnormal result. You do not need a Pap test if your uterus was removed (hysterectomy) and you have not had cancer.      You should be tested each year for STDs (sexually transmitted diseases), if you're at risk.       Ask your doctor if you should have a mammogram.      Have a colonoscopy (test for colon cancer) if someone in your family has had colon cancer or polyps before age 50.       Have a cholesterol test every 5 years.       Have a diabetes test (fasting glucose) after age 45. If you are at risk for diabetes, you should have this test every 3 years.    Shots: Get a flu shot  each year. Get a tetanus shot every 10 years.     Nutrition:     Eat at least 5 servings of fruits and vegetables each day.    Eat whole-grain bread, whole-wheat pasta and brown rice instead of white grains and rice.    Talk to your provider about Calcium and Vitamin D.     Lifestyle    Exercise at least 150 minutes a week (an average of 30 minutes a day, 5 days a week). This will help you control your weight and prevent disease.    Limit alcohol to one drink per day.    No smoking.     Wear sunscreen to prevent skin cancer.    See your dentist every six months for an exam and cleaning.          Follow-ups after your visit        Follow-up notes from your care team     Return for Fasting lab work, Mammogram, Medication check.      Who to contact     If you have questions or need follow up information about today's clinic visit or your schedule please contact [facility] directly at [phone].  Normal or non-critical lab and imaging results will be communicated to you by MyChart, letter or phone within 4 business days after the clinic has received the results. If you do not hear from us within 7 days, please contact the clinic through [facility] or phone. If you have a critical or abnormal lab result, we will notify you by phone as soon as possible.  Submit refill requests through [employer] or call your pharmacy and they will forward the refill request to us. Please allow 3 business days for your refill to be completed.          Additional Information About Your Visit        [facility]Sprout Information     [employer]ika is an electronic gateway that provides easy, online access to your medical records. With Playtika, you can request a clinic appointment, read your test results, renew a prescription or communicate with your care team.     To sign up for [employer]tika visit the website at [email or web address]   You will be asked to enter the access code listed below, as well as some personal information. Please  "follow the directions to create your username and password.     Your access code is: [email or web address]  Expires: 2018  1:58 PM     Your access code will  in 90 days. If you need help or a new code, please contact your [facility] or call [phone] for assistance.        Care EveryWhere ID     This is your Care EveryWhere ID. This could be used by other organizations to access your [facility] medical records  [id number]        Your Vitals Were     Pulse Temperature Height Pulse Oximetry BMI (Body Mass Index)       76 98.2  F (36.8  C) (Oral) 5' 8.75\" (1.746 m) 98% 30.17 kg/m2        Blood Pressure from Last 3 Encounters:   18 114/76   17 122/80   07/15/16 130/80    Weight from Last 3 Encounters:   18 202 lb 12.8 oz (92 kg)   17 228 lb 4.8 oz (103.6 kg)   07/15/16 225 lb 14.4 oz (102.5 kg)              We Performed the Following     HPV High Risk Types DNA Cervical     Pap imaged thin layer screen with HPV - recommended age 30 - 65 years (select HPV order below)          Today's Medication Changes          These changes are accurate as of 18  1:58 PM.  If you have any questions, ask your nurse or doctor.               These medicines have changed or have updated prescriptions.        Dose/Directions    etonogestrel-ethinyl estradiol 0.12-0.015 MG/24HR vaginal ring   Commonly known as:  NUVARING   This may have changed:  See the new instructions.   Used for:  Encounter for surveillance of other contraceptive   Changed by:  Nelida Celestin MD        PLACE 1 RING VAGINALLY EVERY 21 DAYS THEN REMOVE FOR 1 WEEK AND REPEAT   Quantity:  3 each   Refills:  4       venlafaxine 150 MG 24 hr capsule   Commonly known as:  EFFEXOR-XR   This may have changed:  See the new instructions.   Used for:  Moderate recurrent major depression (H)   Changed by:  Nelida Celestin MD        TAKE 1 CAPSULE(150 MG) BY MOUTH DAILY   Quantity:  90 capsule   Refills:  2       "      Where to get your medicines      These medications were sent to [id number] - [facility], [place] - [address]  [address] [facility] [address]     Phone:  [phone]     albuterol 108 (90 BASE) MCG/ACT Inhaler    cyclobenzaprine 10 MG tablet    etonogestrel-ethinyl estradiol 0.12-0.015 MG/24HR vaginal ring    fluticasone 50 MCG/ACT spray    nystatin [id number] UNIT/GM Powd    propranolol 60 MG 24 hr capsule    venlafaxine 150 MG 24 hr capsule         Some of these will need a paper prescription and others can be bought over the counter.  Ask your nurse if you have questions.     Bring a paper prescription for each of these medications     rizatriptan 10 MG ODT tab                Primary Care Provider Office Phone # Fax #    Nelida Celestin [phone] [address]  [facility] [address]        Equal Access to Services     [facility]: Hadii aad ku hadasho Soomaali, waaxda luqadaha, qaybta kaalmada adeegyada, waxay lexii hayrg fajardo . So [facility] [phone].    ATENCIÓN: Si habla español, tiene a loyola disposición servicios gratuitos de asistencia lingüística. Llame al [phone].    We comply with applicable federal civil rights laws and [place] laws. We do not discriminate on the basis of race, color, national origin, age, disability, sex, sexual orientation, or gender identity.            Thank you!     Thank you for choosing [facility]  for your care. Our goal is always to provide you with excellent care. Hearing back from our patients is one way we can continue to improve our services. Please take a few minutes to complete the written survey that you may receive in the mail after your visit with us. Thank you!             Your Updated Medication List - Protect others around you: Learn how to safely use, store and throw away your medicines at www.disposemymeds.org.           This list is accurate as of 2/23/18  1:58 PM.  Always use your most recent med list.                   Brand Name Dispense Instructions for use Diagnosis    albuterol 108 (90 BASE) MCG/ACT Inhaler    PROAIR HFA    1 Inhaler    2 puffs every 4 hours as needed.    Intermittent asthma, uncomplicated       cyclobenzaprine 10 MG tablet    FLEXERIL    30 tablet    Take 1 tablet (10 mg) by mouth 2 times daily as needed for muscle spasms    Back muscle spasm       etonogestrel-ethinyl estradiol 0.12-0.015 MG/24HR vaginal ring    NUVARING    3 each    PLACE 1 RING VAGINALLY EVERY 21 DAYS THEN REMOVE FOR 1 WEEK AND REPEAT    Encounter for surveillance of other contraceptive       fluticasone 50 MCG/ACT spray    FLONASE    48 g    Spray 2 sprays into both nostrils daily    Chronic nonseasonal allergic rhinitis due to other allergen       nystatin [id number] UNIT/GM Powd    MYCOSTATIN    60 g    Use to squirt inside the socks once daily    Onychomycosis       propranolol 60 MG 24 hr capsule    INDERAL LA    90 capsule    Take 1 capsule (60 mg) by mouth daily    Chronic migraine without aura without status migrainosus, not intractable       rizatriptan 10 MG ODT tab    MAXALT-MLT    6 tablet    Take 1 tablet (10 mg) by mouth See Admin Instructions WITH ONSET OF MIGRAINE, MAY REPEAT ONCE AFTER 2 HOURS. DO NOT EXCEED 3 TABLETS IN 24 HOURS.    Chronic migraine without aura without status migrainosus, not intractable       venlafaxine 150 MG 24 hr capsule    EFFEXOR-XR    90 capsule    TAKE 1 CAPSULE(150 MG) BY MOUTH DAILY    Moderate recurrent major depression (H)

## 2018-02-23 NOTE — LETTER
March 6, 2018    Treasure Pradhan  [address]  [facility] [address]    Dear Treasure,  We are happy to inform you that your PAP smear result from 2/23/18 is normal.  We are now able to do a follow up test on PAP smears. The DNA test is for HPV (Human Papilloma Virus). Cervical cancer is closely linked with certain types of HPV. Your result showed no evidence of high risk HPV.  Therefore we recommend you return in 3 years for your next pap smear.  You will still need to return to the clinic every year for an annual exam and other preventive tests.  Please contact the clinic at [phone] with any questions.  Sincerely,    Nelida Celestin MD/joshua

## 2018-02-24 ASSESSMENT — PATIENT HEALTH QUESTIONNAIRE - PHQ9: SUM OF ALL RESPONSES TO PHQ QUESTIONS 1-9: 13

## 2018-02-24 ASSESSMENT — ANXIETY QUESTIONNAIRES: GAD7 TOTAL SCORE: 17

## 2018-02-24 ASSESSMENT — ASTHMA QUESTIONNAIRES: ACT_TOTALSCORE: 23

## 2018-02-25 ENCOUNTER — HEALTH MAINTENANCE LETTER (OUTPATIENT)
Age: 43
End: 2018-02-25

## 2018-02-26 ENCOUNTER — TELEPHONE (OUTPATIENT)
Dept: PEDIATRICS | Facility: CLINIC | Age: 43
End: 2018-02-26
Payer: COMMERCIAL

## 2018-02-26 DIAGNOSIS — Z53.9 ERRONEOUS ENCOUNTER--DISREGARD: Primary | ICD-10-CM

## 2018-02-27 LAB
COPATH REPORT: NORMAL
PAP: NORMAL

## 2018-03-01 LAB
FINAL DIAGNOSIS: NORMAL
HPV HR 12 DNA CVX QL NAA+PROBE: NEGATIVE
HPV16 DNA SPEC QL NAA+PROBE: NEGATIVE
HPV18 DNA SPEC QL NAA+PROBE: NEGATIVE
SPECIMEN DESCRIPTION: NORMAL
SPECIMEN SOURCE CVX/VAG CYTO: NORMAL

## 2018-06-25 ENCOUNTER — TELEPHONE (OUTPATIENT)
Dept: PEDIATRICS | Facility: CLINIC | Age: 43
End: 2018-06-25

## 2018-06-25 NOTE — TELEPHONE ENCOUNTER
1st attempt  Left message for patient to return clinic call regarding scheduling. Patient needs a Return  appointment for 6 month med check with Dr Celestin on or around 8/23/18 and  Fasting labs. Number to clinic and Mychart option given, please assist in scheduling once patient returns clinic call.    Call Center OKAY TO SCHEDULE.    Thanks,   Mindy Marquez  Primary Care   [facility] [place]

## 2018-07-17 NOTE — TELEPHONE ENCOUNTER
3rd    Chart letter created and sent.    Mindy Maruqez  Primary Care   [facility] [place]

## 2019-01-02 DIAGNOSIS — F33.1 MODERATE RECURRENT MAJOR DEPRESSION (H): ICD-10-CM

## 2019-01-02 NOTE — LETTER
January 7, 2019      Treasure Pradhan  [address]  [facility] [address]              Dear Treasure,    We recently received a refill request from your pharmacy requesting a refill of :     Signed Prescriptions:                        Disp   Refills    venlafaxine (EFFEXOR-XR) 150 MG 24 hr caps*30 cap*0        Sig: TAKE 1 CAPSULE(150 MG) BY MOUTH DAILY  Authorizing Provider: KIMBERLEY DOUGLASS  Ordering User: MISTI PADGETT      A review of your chart indicates that your last office visit was on 2/23/18.  An appointment is required every 6 months with your provider. We have authorized one time 30 day refill of your medication to allow time for you to schedule.     Please call the clinic at [phone], or log in to your [employer] account at [email or web address] to schedule your appointment within that time frame so there is no delay in next month's refill.    Thank you for taking an active role in your healthcare.    Sincerely,    Misti Padgett RN

## 2019-01-07 RX ORDER — VENLAFAXINE HYDROCHLORIDE 150 MG/1
CAPSULE, EXTENDED RELEASE ORAL
Qty: 30 CAPSULE | Refills: 0 | Status: SHIPPED | OUTPATIENT
Start: 2019-01-07 | End: 2019-02-08

## 2019-01-07 NOTE — TELEPHONE ENCOUNTER
Medication is being filled for 1 time refill only due to:  Patient needs to be seen because due for recheck.     Letter sent.     Misti Padgett RN

## 2019-02-06 ENCOUNTER — TELEPHONE (OUTPATIENT)
Dept: PEDIATRICS | Facility: CLINIC | Age: 44
End: 2019-02-06

## 2019-02-06 DIAGNOSIS — Z12.39 BREAST CANCER SCREENING: Primary | ICD-10-CM

## 2019-02-06 NOTE — TELEPHONE ENCOUNTER
[facility] Call Center    Phone Message    May a detailed message be left on voicemail: yes    Reason for Call: Order(s): Other:   Reason for requested: Mammogram  Date needed: before 3/8/2019  Provider name: Dr. Celestin      Action Taken: Message routed to:  Primary Care p [id number]

## 2019-02-06 NOTE — TELEPHONE ENCOUNTER
Order pended and routed to provider for review.     Lori Vera RN   [facility]

## 2019-02-08 DIAGNOSIS — F33.1 MODERATE RECURRENT MAJOR DEPRESSION (H): ICD-10-CM

## 2019-02-08 RX ORDER — VENLAFAXINE HYDROCHLORIDE 150 MG/1
150 CAPSULE, EXTENDED RELEASE ORAL DAILY
Qty: 30 CAPSULE | Refills: 0 | Status: SHIPPED | OUTPATIENT
Start: 2019-02-08 | End: 2019-03-08

## 2019-02-08 NOTE — TELEPHONE ENCOUNTER
venlafaxine (EFFEXOR-XR) 150 MG 24 hr capsule      Last Written Prescription Date:  1/7/19  Last Fill Quantity: 30,   # refills: 0  Last Office Visit: 2/23/2018  Future Office visit:    Next 5 appointments (look out 90 days)    Mar 08, 2019  1:30 PM CST  PHYSICAL with Nelida Celestin MD  [facility] ([facility]) [address] [place] [id number]  [phone]

## 2019-02-24 DIAGNOSIS — Z13.0 SCREENING FOR DEFICIENCY ANEMIA: ICD-10-CM

## 2019-02-24 DIAGNOSIS — Z13.1 SCREENING FOR DIABETES MELLITUS (DM): ICD-10-CM

## 2019-02-24 DIAGNOSIS — Z00.00 ROUTINE GENERAL MEDICAL EXAMINATION AT A HEALTH CARE FACILITY: Primary | ICD-10-CM

## 2019-02-24 DIAGNOSIS — Z13.6 CARDIOVASCULAR SCREENING; LDL GOAL LESS THAN 160: ICD-10-CM

## 2019-03-07 DIAGNOSIS — Z00.00 ROUTINE GENERAL MEDICAL EXAMINATION AT A HEALTH CARE FACILITY: ICD-10-CM

## 2019-03-07 DIAGNOSIS — Z13.1 SCREENING FOR DIABETES MELLITUS (DM): ICD-10-CM

## 2019-03-07 DIAGNOSIS — Z13.6 CARDIOVASCULAR SCREENING; LDL GOAL LESS THAN 160: ICD-10-CM

## 2019-03-07 DIAGNOSIS — Z13.0 SCREENING FOR DEFICIENCY ANEMIA: ICD-10-CM

## 2019-03-07 LAB
BASOPHILS # BLD AUTO: 0.1 10E9/L (ref 0–0.2)
BASOPHILS NFR BLD AUTO: 0.9 %
CHOLEST SERPL-MCNC: 208 MG/DL
DIFFERENTIAL METHOD BLD: NORMAL
EOSINOPHIL # BLD AUTO: 0.2 10E9/L (ref 0–0.7)
EOSINOPHIL NFR BLD AUTO: 3.5 %
ERYTHROCYTE [DISTWIDTH] IN BLOOD BY AUTOMATED COUNT: 13.3 % (ref 10–15)
GLUCOSE SERPL-MCNC: 97 MG/DL (ref 70–99)
HCT VFR BLD AUTO: 42.9 % (ref 35–47)
HDLC SERPL-MCNC: 62 MG/DL
HGB BLD-MCNC: 13.5 G/DL (ref 11.7–15.7)
IMM GRANULOCYTES # BLD: 0 10E9/L (ref 0–0.4)
IMM GRANULOCYTES NFR BLD: 0.3 %
LDLC SERPL CALC-MCNC: 126 MG/DL
LYMPHOCYTES # BLD AUTO: 1.9 10E9/L (ref 0.8–5.3)
LYMPHOCYTES NFR BLD AUTO: 33.1 %
MCH RBC QN AUTO: 30.8 PG (ref 26.5–33)
MCHC RBC AUTO-ENTMCNC: 31.5 G/DL (ref 31.5–36.5)
MCV RBC AUTO: 98 FL (ref 78–100)
MONOCYTES # BLD AUTO: 0.7 10E9/L (ref 0–1.3)
MONOCYTES NFR BLD AUTO: 12 %
NEUTROPHILS # BLD AUTO: 2.9 10E9/L (ref 1.6–8.3)
NEUTROPHILS NFR BLD AUTO: 50.2 %
NONHDLC SERPL-MCNC: 146 MG/DL
PLATELET # BLD AUTO: 367 10E9/L (ref 150–450)
RBC # BLD AUTO: 4.38 10E12/L (ref 3.8–5.2)
TRIGL SERPL-MCNC: 102 MG/DL
WBC # BLD AUTO: 5.8 10E9/L (ref 4–11)

## 2019-03-07 PROCEDURE — 80061 LIPID PANEL: CPT | Performed by: FAMILY MEDICINE

## 2019-03-07 PROCEDURE — 82947 ASSAY GLUCOSE BLOOD QUANT: CPT | Performed by: FAMILY MEDICINE

## 2019-03-07 PROCEDURE — 85025 COMPLETE CBC W/AUTO DIFF WBC: CPT | Performed by: FAMILY MEDICINE

## 2019-03-07 PROCEDURE — 36415 COLL VENOUS BLD VENIPUNCTURE: CPT | Performed by: FAMILY MEDICINE

## 2019-03-07 PROCEDURE — 87389 HIV-1 AG W/HIV-1&-2 AB AG IA: CPT | Performed by: FAMILY MEDICINE

## 2019-03-08 ENCOUNTER — OFFICE VISIT (OUTPATIENT)
Dept: PEDIATRICS | Facility: CLINIC | Age: 44
End: 2019-03-08
Payer: COMMERCIAL

## 2019-03-08 VITALS
WEIGHT: 211.2 LBS | HEART RATE: 75 BPM | DIASTOLIC BLOOD PRESSURE: 85 MMHG | TEMPERATURE: 97.9 F | HEIGHT: 69 IN | OXYGEN SATURATION: 98 % | BODY MASS INDEX: 31.28 KG/M2 | SYSTOLIC BLOOD PRESSURE: 121 MMHG

## 2019-03-08 DIAGNOSIS — J45.20 MILD INTERMITTENT ASTHMA WITHOUT COMPLICATION: ICD-10-CM

## 2019-03-08 DIAGNOSIS — G89.29 CHRONIC RIGHT HIP PAIN: ICD-10-CM

## 2019-03-08 DIAGNOSIS — F41.9 ANXIETY: ICD-10-CM

## 2019-03-08 DIAGNOSIS — G43.709 CHRONIC MIGRAINE WITHOUT AURA WITHOUT STATUS MIGRAINOSUS, NOT INTRACTABLE: ICD-10-CM

## 2019-03-08 DIAGNOSIS — Z11.4 SCREENING FOR HUMAN IMMUNODEFICIENCY VIRUS: ICD-10-CM

## 2019-03-08 DIAGNOSIS — F33.1 MODERATE RECURRENT MAJOR DEPRESSION (H): ICD-10-CM

## 2019-03-08 DIAGNOSIS — J30.9 CHRONIC ALLERGIC RHINITIS: ICD-10-CM

## 2019-03-08 DIAGNOSIS — Z00.00 ROUTINE GENERAL MEDICAL EXAMINATION AT A HEALTH CARE FACILITY: Primary | ICD-10-CM

## 2019-03-08 DIAGNOSIS — E66.9 OBESITY (BMI 30-39.9): ICD-10-CM

## 2019-03-08 DIAGNOSIS — M25.551 CHRONIC RIGHT HIP PAIN: ICD-10-CM

## 2019-03-08 DIAGNOSIS — Z30.49 ENCOUNTER FOR SURVEILLANCE OF OTHER CONTRACEPTIVE: ICD-10-CM

## 2019-03-08 PROCEDURE — 99214 OFFICE O/P EST MOD 30 MIN: CPT | Mod: 25 | Performed by: FAMILY MEDICINE

## 2019-03-08 PROCEDURE — 99396 PREV VISIT EST AGE 40-64: CPT | Performed by: FAMILY MEDICINE

## 2019-03-08 RX ORDER — ESCITALOPRAM OXALATE 10 MG/1
10 TABLET ORAL DAILY
Qty: 30 TABLET | Refills: 1 | Status: SHIPPED | OUTPATIENT
Start: 2019-03-08 | End: 2019-04-19

## 2019-03-08 RX ORDER — MOMETASONE FUROATE MONOHYDRATE 50 UG/1
2 SPRAY, METERED NASAL DAILY
Qty: 1 BOX | Refills: 11 | Status: SHIPPED | OUTPATIENT
Start: 2019-03-08 | End: 2020-09-22

## 2019-03-08 RX ORDER — RIZATRIPTAN BENZOATE 10 MG/1
10 TABLET, ORALLY DISINTEGRATING ORAL SEE ADMIN INSTRUCTIONS
Qty: 6 TABLET | Refills: 6 | Status: SHIPPED | OUTPATIENT
Start: 2019-03-08 | End: 2019-11-20

## 2019-03-08 RX ORDER — VENLAFAXINE HYDROCHLORIDE 150 MG/1
150 CAPSULE, EXTENDED RELEASE ORAL DAILY
Qty: 90 CAPSULE | Refills: 1 | Status: SHIPPED | OUTPATIENT
Start: 2019-03-08 | End: 2019-08-12

## 2019-03-08 RX ORDER — FEXOFENADINE HCL 180 MG/1
180 TABLET ORAL DAILY
COMMUNITY
End: 2022-05-19

## 2019-03-08 RX ORDER — ETONOGESTREL AND ETHINYL ESTRADIOL VAGINAL RING .015; .12 MG/D; MG/D
RING VAGINAL
Qty: 3 EACH | Refills: 4 | Status: SHIPPED | OUTPATIENT
Start: 2019-03-08 | End: 2020-01-08

## 2019-03-08 ASSESSMENT — PATIENT HEALTH QUESTIONNAIRE - PHQ9
SUM OF ALL RESPONSES TO PHQ QUESTIONS 1-9: 10
5. POOR APPETITE OR OVEREATING: MORE THAN HALF THE DAYS

## 2019-03-08 ASSESSMENT — ANXIETY QUESTIONNAIRES
2. NOT BEING ABLE TO STOP OR CONTROL WORRYING: SEVERAL DAYS
6. BECOMING EASILY ANNOYED OR IRRITABLE: NEARLY EVERY DAY
7. FEELING AFRAID AS IF SOMETHING AWFUL MIGHT HAPPEN: SEVERAL DAYS
1. FEELING NERVOUS, ANXIOUS, OR ON EDGE: MORE THAN HALF THE DAYS
GAD7 TOTAL SCORE: 12
3. WORRYING TOO MUCH ABOUT DIFFERENT THINGS: SEVERAL DAYS
5. BEING SO RESTLESS THAT IT IS HARD TO SIT STILL: MORE THAN HALF THE DAYS

## 2019-03-08 ASSESSMENT — MIFFLIN-ST. JEOR: SCORE: 1674.44

## 2019-03-08 NOTE — PATIENT INSTRUCTIONS
Schedule for mammogram  Schedule for recheck in 5-6 weeks    Start on LEXAPRO at bedtime  Start on counseling    Use diclofenac gel as needed for pain of right hip  Start on PT for right hip pain        Preventive Health Recommendations  Female Ages 40 to 49    Yearly exam:     See your health care provider every year in order to  1. Review health changes.   2. Discuss preventive care.    3. Review your medicines if your doctor prescribed any.      Get a Pap test every three years (unless you have an abnormal result and your provider advises testing more often).      If you get Pap tests with HPV test, you only need to test every 5 years, unless you have an abnormal result. You do not need a Pap test if your uterus was removed (hysterectomy) and you have not had cancer.      You should be tested each year for STDs (sexually transmitted diseases), if you're at risk.     Ask your doctor if you should have a mammogram.      Have a colonoscopy (test for colon cancer) if someone in your family has had colon cancer or polyps before age 50.       Have a cholesterol test every 5 years.       Have a diabetes test (fasting glucose) after age 45. If you are at risk for diabetes, you should have this test every 3 years.    Shots: Get a flu shot each year. Get a tetanus shot every 10 years.     Nutrition:     Eat at least 5 servings of fruits and vegetables each day.    Eat whole-grain bread, whole-wheat pasta and brown rice instead of white grains and rice.    Get adequate Calcium and Vitamin D.      Lifestyle    Exercise at least 150 minutes a week (an average of 30 minutes a day, 5 days a week). This will help you control your weight and prevent disease.    Limit alcohol to one drink per day.    No smoking.     Wear sunscreen to prevent skin cancer.    See your dentist every six months for an exam and cleaning.

## 2019-03-08 NOTE — PROGRESS NOTES
SUBJECTIVE:   CC: Treasure Pradhan is an 43 year old woman who presents for preventive health visit.     Healthy Habits: Patient is here for annual physical and with other concerns as mentioned below.    Do you get at least three servings of calcium containing foods daily (dairy, green leafy vegetables, etc.)? yes    Amount of exercise or daily activities, outside of work: 0 day(s) per week    Problems taking medications regularly No    Medication side effects: No    Have you had an eye exam in the past two years? yes    Do you see a dentist twice per year? yes    Do you have sleep apnea, excessive snoring or daytime drowsiness?no      Medication Recheck - Discuss alternative to Flonase as she does not have relief from it.    Right Hip Pain - Ongoing for 9 months but worse in the last few months. No injury and patient is on her feet at work a lot. Patient have Ibuprofen, Aleve and Lidocaine topical with mild relief.  Patient denies history of fall or trauma, denies left-sided symptoms.  Has history of chronic low back pain  Gets pain while walking problems, unable to do the regular exercises that she had in the past    Depression and Anxiety Follow-Up    Status since last visit: Worsened in terms of anxiety    Other associated symptoms:None    Complicating factors:     Significant life event: No     Current substance abuse: None    PHQ 12/9/2015 7/15/2016 2/23/2018   PHQ-9 Total Score 13 13 13   Q9: Suicide Ideation Not at all Not at all Not at all     NATANAEL-7 SCORE 12/9/2015 7/15/2016 2/23/2018   Total Score - - -   Total Score 7 5 17     In the past two weeks have you had thoughts of suicide or self-harm?  No.    Do you have concerns about your personal safety or the safety of others?   No  PHQ-9  English  PHQ-9   Any Language  NATANAEL-7  Suicide Assessment Five-step Evaluation and Treatment (SAFE-T)    Today's PHQ-2 Score:   PHQ-2 ( 1999 Pf[employer]) 3/8/2019 2/23/2018   Q1: Little interest or pleasure in doing things 0  2   Q2: Feeling down, depressed or hopeless 0 2   PHQ-2 Score 0 4       Abuse: Current or Past(Physical, Sexual or Emotional)- No  Do you feel safe in your environment? No    Social History     Tobacco Use     Smoking status: Never Smoker     Smokeless tobacco: Never Used   Substance Use Topics     Alcohol use: Yes     Comment: 3-4 drinks per month     If you drink alcohol do you typically have >3 drinks per day or >7 drinks per week? No                     Reviewed orders with patient.  Reviewed health maintenance and updated orders accordingly - No  Labs reviewed in EPIC  BP Readings from Last 3 Encounters:   19 121/85   18 114/76   17 122/80    Wt Readings from Last 3 Encounters:   19 95.8 kg (211 lb 3.2 oz)   18 92 kg (202 lb 12.8 oz)   17 103.6 kg (228 lb 4.8 oz)                  Patient Active Problem List   Diagnosis     Migraine     Anxiety     Mild intermittent asthma without complication     Anal pain     Back pain     Moderate recurrent major depression (H)     Generalized anxiety disorder     Obesity (BMI 30-39.9)     Dyslipidemia     Onychomycosis     Therapeutic drug monitoring     Degeneration of thoracic or thoracolumbar intervertebral disc     DDD (degenerative disc disease), cervical     Migraine without aura and without status migrainosus, not intractable     Encounter for surveillance of other contraceptive     Pigmented skin lesion, midback, 3mm     Chronic allergic rhinitis     Skin lesion, left thigh     Chronic right hip pain     Past Surgical History:   Procedure Laterality Date     SURGICAL HISTORY OF -   2007         SURGICAL HISTORY OF -   3/2006    Rt Foot mass - Perineurioma       Social History     Tobacco Use     Smoking status: Never Smoker     Smokeless tobacco: Never Used   Substance Use Topics     Alcohol use: Yes     Comment: 3-4 drinks per month     Family History   Problem Relation Age of Onset     Heart Disease Father       Prostate Cancer Father      Diabetes Maternal Grandfather      Heart Disease Paternal Grandfather      Genetic Disorder Daughter         At Birth/ [place]         Current Outpatient Medications   Medication Sig Dispense Refill     albuterol (PROAIR HFA) 108 (90 BASE) MCG/ACT Inhaler 2 puffs every 4 hours as needed. 1 Inhaler 1     cyclobenzaprine (FLEXERIL) 10 MG tablet Take 1 tablet (10 mg) by mouth 2 times daily as needed for muscle spasms 30 tablet 1     diclofenac (VOLTAREN) 1 % topical gel Place onto the skin 4 times daily 100 g 1     docusate sodium (COLACE) 50 MG capsule Take 50 mg by mouth 2 times daily       escitalopram (LEXAPRO) 10 MG tablet Take 1 tablet (10 mg) by mouth daily 30 tablet 1     etonogestrel-ethinyl estradiol (NUVARING) 0.12-0.015 MG/24HR vaginal ring PLACE 1 RING VAGINALLY EVERY 21 DAYS THEN REMOVE FOR 1 WEEK AND REPEAT 3 each 4     fexofenadine (ALLEGRA) 180 MG tablet Take 180 mg by mouth daily       MAGNESIUM PO Take 1 tablet by mouth daily       mometasone (NASONEX) 50 MCG/ACT nasal spray Spray 2 sprays into both nostrils daily 1 Box 11     Multiple Vitamins-Minerals (MULTIVITAMIN ADULT PO) Take 1 tablet by mouth daily       nystatin (MYCOSTATIN) 589219 UNIT/GM POWD Use to squirt inside the socks once daily 60 g 3     rizatriptan (MAXALT-MLT) 10 MG ODT Take 1 tablet (10 mg) by mouth See Admin Instructions WITH ONSET OF MIGRAINE, MAY REPEAT ONCE AFTER 2 HOURS. DO NOT EXCEED 3 TABLETS IN 24 HOURS. 6 tablet 6     venlafaxine (EFFEXOR-XR) 150 MG 24 hr capsule Take 1 capsule (150 mg) by mouth daily 90 capsule 1     Allergies   Allergen Reactions     Terbinafine Itching     Recent Labs   Lab Test 03/07/19  0944 03/03/14  1004 04/24/12  1101 01/03/11  1011   * 146* 145*  --    HDL 62 46* 43*  --    TRIG 102 96 63  --    ALT  --  33  --   --    CR  --   --  0.69  --    GFRESTIMATED  --   --  >90  --    GFRESTBLACK  --   --  >90  --    POTASSIUM  --   --  4.1  --    TSH  --   --  0.64 1.21         Mammogram Screening: Patient under age 50, mutual decision reflected in health maintenance.      Pertinent mammograms are reviewed under the imaging tab.  History of abnormal Pap smear: NO - age 30- 65 PAP every 3 years recommended  PAP / HPV Latest Ref Rng & Units 2018 6/3/2015 2012   PAP - NIL NIL NIL   HPV 16 DNA NEG:Negative Negative - -   HPV 18 DNA NEG:Negative Negative - -   OTHER HR HPV NEG:Negative Negative - -     Reviewed and updated as needed this visit by clinical staff  Tobacco  Allergies  Meds  Med Hx  Surg Hx  Fam Hx  Soc Hx        Reviewed and updated as needed this visit by Provider          Past Medical History:   Diagnosis Date     Allergic rhinitis      Asthma, intermittent      Back pain      Depression with anxiety      Hyperlipidemia LDL goal < 160      Migraine       Past Surgical History:   Procedure Laterality Date     SURGICAL HISTORY OF -   2007         SURGICAL HISTORY OF -   3/2006    Rt Foot mass - Perineurioma     Obstetric History       T2      L2     SAB0   TAB0   Ectopic0   Multiple0   Live Births2       # Outcome Date GA Lbr Presley/2nd Weight Sex Delivery Anes PTL Lv   2 Term      -SEC   INDU   1 Term      -SEC   INDU          ROS:  CONSTITUTIONAL: NEGATIVE for fever, chills, change in weight  INTEGUMENTARU/SKIN: NEGATIVE for worrisome rashes, moles or lesions  EYES: NEGATIVE for vision changes or irritation  ENT: NEGATIVE for ear, mouth and throat problems  ENT: History of chronic rhinitis  RESP: NEGATIVE for significant cough or SOB  BREAST: NEGATIVE for masses, tenderness or discharge  CV: NEGATIVE for chest pain, palpitations or peripheral edema  GI: NEGATIVE for nausea, abdominal pain, heartburn, or change in bowel habits  : NEGATIVE for unusual urinary or vaginal symptoms. Periods are regular.  MUSCULOSKELETAL: Chronic right hip pain  NEURO: NEGATIVE for weakness, dizziness or paresthesias  NEURO: Hx  "headaches-migraine  ENDOCRINE: NEGATIVE for temperature intolerance, skin/hair changes  HEME/ALLERGY/IMMUNE: NEGATIVE for bleeding problems  PSYCHIATRIC: as above    OBJECTIVE:   /85 (BP Location: Right arm, Patient Position: Sitting, Cuff Size: Adult Large)   Pulse 75   Temp 97.9  F (36.6  C) (Oral)   Ht 1.74 m (5' 8.5\")   Wt 95.8 kg (211 lb 3.2 oz)   SpO2 98%   BMI 31.65 kg/m    EXAM:  GENERAL: healthy, alert and no distress  GENERAL: healthy, alert, no distress and obese  EYES: Eyes grossly normal to inspection, PERRL and conjunctivae and sclerae normal  HENT: ear canals and TM's normal, nose and mouth without ulcers or lesions  NECK: no adenopathy, no asymmetry, masses, or scars and thyroid normal to palpation  RESP: lungs clear to auscultation - no rales, rhonchi or wheezes  BREAST: normal without masses, tenderness or nipple discharge and no palpable axillary masses or adenopathy  CV: regular rate and rhythm, normal S1 S2, no S3 or S4, no murmur, click or rub, no peripheral edema and peripheral pulses strong  ABDOMEN: soft, nontender, no hepatosplenomegaly, no masses and bowel sounds normal   (female): normal female external genitalia, normal urethral meatus, vaginal mucosa pink, moist, well rugated, and normal cervix/adnexa/uterus without masses or discharge  MS: no gross musculoskeletal defects noted, no edema  Normal gait, normal lower back exam right hip-moderate tenderness over the trochanter, positive Amy sign, full range of motion of the right hip  SKIN: no suspicious lesions or rashes  NEURO: Normal strength and tone, mentation intact and speech normal  PSYCH: mentation appears normal and affect somewhat  flat    Diagnostic Test Results:  No results found for this or any previous visit (from the past 24 hour(s)).   Results for orders placed or performed in visit on 03/07/19 (from the past 48 hour(s))   **Glucose FUTURE anytime   Result Value Ref Range    Glucose 97 70 - 99 mg/dL   Lipid " panel reflex to direct LDL Fasting   Result Value Ref Range    Cholesterol 208 (H) <200 mg/dL    Triglycerides 102 <150 mg/dL    HDL Cholesterol 62 >49 mg/dL    LDL Cholesterol Calculated 126 (H) <100 mg/dL    Non HDL Cholesterol 146 (H) <130 mg/dL   CBC with platelets differential   Result Value Ref Range    WBC 5.8 4.0 - 11.0 10e9/L    RBC Count 4.38 3.8 - 5.2 10e12/L    Hemoglobin 13.5 11.7 - 15.7 g/dL    Hematocrit 42.9 35.0 - 47.0 %    MCV 98 78 - 100 fl    MCH 30.8 26.5 - 33.0 pg    MCHC 31.5 31.5 - 36.5 g/dL    RDW 13.3 10.0 - 15.0 %    Platelet Count 367 150 - 450 10e9/L    Diff Method Automated Method     % Neutrophils 50.2 %    % Lymphocytes 33.1 %    % Monocytes 12.0 %    % Eosinophils 3.5 %    % Basophils 0.9 %    % Immature Granulocytes 0.3 %    Absolute Neutrophil 2.9 1.6 - 8.3 10e9/L    Absolute Lymphocytes 1.9 0.8 - 5.3 10e9/L    Absolute Monocytes 0.7 0.0 - 1.3 10e9/L    Absolute Eosinophils 0.2 0.0 - 0.7 10e9/L    Absolute Basophils 0.1 0.0 - 0.2 10e9/L    Abs Immature Granulocytes 0.0 0 - 0.4 10e9/L         ASSESSMENT/PLAN:   1. Routine general medical examination at a health care facility  Discussed on regular exercises, daily calcium intake, healthy eating, self breast exams monthly and routine dental checks      2. Chronic right hip pain  ddx-right hip bursitis  Recommended to start on physical therapy, apply local ice and heat, avoid triggering activities, prescription given for diclofenac gel to use as needed over the right hip  Follow-up if no better in 4-5 weeks of PT or sooner if needed.  We will consider imaging/sports consult  for persistent or worsening concerns  Dosing and potential medication side effects discussed.  Patient verbalised understanding and is agreeable to the plan.    - [facility] REFERRAL; Future  - diclofenac (VOLTAREN) 1 % topical gel; Place onto the skin 4 times daily  Dispense: 100 g; Refill: 1    3. Chronic migraine without aura without status  migrainosus, not intractable  Stable, continue with Maxalt as needed, patient has not been using the propranolol for migraine prophylaxis.  Recheck in 6 months or sooner if needed  - rizatriptan (MAXALT-MLT) 10 MG ODT; Take 1 tablet (10 mg) by mouth See Admin Instructions WITH ONSET OF MIGRAINE, MAY REPEAT ONCE AFTER 2 HOURS. DO NOT EXCEED 3 TABLETS IN 24 HOURS.  Dispense: 6 tablet; Refill: 6    4. Encounter for surveillance of other contraceptive    - etonogestrel-ethinyl estradiol (NUVARING) 0.12-0.015 MG/24HR vaginal ring; PLACE 1 RING VAGINALLY EVERY 21 DAYS THEN REMOVE FOR 1 WEEK AND REPEAT  Dispense: 3 each; Refill: 4    5. Moderate recurrent major depression (H)  Needing improvement   Recommended to continue with Effexor 150 mg daily in the morning and the evening recheck in 5-6 weeks or sooner if needed.  Dosing and potential medication side effects discussed.  Patient verbalised understanding and is agreeable to the plan.    - venlafaxine (EFFEXOR-XR) 150 MG 24 hr capsule; Take 1 capsule (150 mg) by mouth daily  Dispense: 90 capsule; Refill: 1  - MENTAL HEALTH REFERRAL  - Adult; Assessments and Testing; General Psychological Testing; [facility]: [facility] [phone]; We will contact you to schedule the appointment or please call with any questions  - escitalopram (LEXAPRO) 10 MG tablet; Take 1 tablet (10 mg) by mouth daily  Dispense: 30 tablet; Refill: 1    6. Anxiety  as above     venlafaxine (EFFEXOR-XR) 150 MG 24 hr capsule; Take 1 capsule (150 mg) by mouth daily  Dispense: 90 capsule; Refill: 1  - escitalopram (LEXAPRO) 10 MG tablet; Take 1 tablet (10 mg) by mouth daily  Dispense: 30 tablet; Refill: 1  - MENTAL HEALTH REFERRAL  - Adult; Assessments and Testing; General Psychological Testing; [facility]: [facility] [phone]; We will contact you to schedule the appointment or please call with any questions    7. Mild intermittent asthma without complication  Stable, continue  "to use albuterol inhaler as needed    8. Obesity (BMI 30-39.9)  Wt Readings from Last 5 Encounters:   19 95.8 kg (211 lb 3.2 oz)   18 92 kg (202 lb 12.8 oz)   17 103.6 kg (228 lb 4.8 oz)   07/15/16 102.5 kg (225 lb 14.4 oz)   12/09/15 107.8 kg (237 lb 11.2 oz)     Emphasized on weight loss, portion control, low calorie and low fat diet, healthy eating, regular exercises.      9. Chronic allergic rhinitis  Patient's request, prescription was switched from Flonase nasal sprays to Nasonex nasal sprays  Dosing and potential medication side effects discussed.    - mometasone (NASONEX) 50 MCG/ACT nasal spray; Spray 2 sprays into both nostrils daily  Dispense: 1 Box; Refill: 11    COUNSELING:   Reviewed preventive health counseling, as reflected in patient instructions  Special attention given to:        Regular exercise       Healthy diet/nutrition       Vision screening       Contraception       Family planning       Folic Acid Counseling       Osteoporosis Prevention/Bone Health       HIV screeninx in teen years, 1x in adult years, and at intervals if high risk       The 10-year ASCVD risk score ([place] OLIVERA Jr., et al., 2013) is: 0.5%    Values used to calculate the score:      Age: 43 years      Sex: Female      Is Non- : No      Diabetic: No      Tobacco smoker: No      Systolic Blood Pressure: 121 mmHg      Is BP treated: No      HDL Cholesterol: 62 mg/dL      Total Cholesterol: 208 mg/dL    BP Readings from Last 1 Encounters:   19 121/85     Estimated body mass index is 31.65 kg/m  as calculated from the following:    Height as of this encounter: 1.74 m (5' 8.5\").    Weight as of this encounter: 95.8 kg (211 lb 3.2 oz).    BP Screening:   Last 3 BP Readings:    BP Readings from Last 3 Encounters:   19 121/85   18 114/76   17 122/80       The following was recommended to the patient:  Re-screen BP within a year and recommended lifestyle " modifications  Weight management plan: Discussed healthy diet and exercise guidelines     reports that  has never smoked. she has never used smokeless tobacco.      Counseling Resources:  ATP IV Guidelines  Pooled Cohorts Equation Calculator  Breast Cancer Risk Calculator  FRAX Risk Assessment  ICSI Preventive Guidelines  Dietary Guidelines for Americans, 2010  USDA's MyPlate  ASA Prophylaxis  Lung CA Screening    Nelida Celestin MD  [facility]  Chart documentation done in part with Dragon Voice recognition Software. Although reviewed after completion, some word and grammatical error may remain.

## 2019-03-09 ASSESSMENT — ASTHMA QUESTIONNAIRES: ACT_TOTALSCORE: 24

## 2019-03-09 ASSESSMENT — ANXIETY QUESTIONNAIRES: GAD7 TOTAL SCORE: 12

## 2019-03-11 LAB — HIV 1+2 AB+HIV1 P24 AG SERPL QL IA: NONREACTIVE

## 2019-03-11 NOTE — RESULT ENCOUNTER NOTE
Dear Treasure,  Your test results for HIV screening is negative, this is good and reassuring.   Let me know if you have any questions. Take care.  Nelida Celestin MD

## 2019-03-14 ENCOUNTER — THERAPY VISIT (OUTPATIENT)
Dept: PHYSICAL THERAPY | Facility: CLINIC | Age: 44
End: 2019-03-14
Payer: COMMERCIAL

## 2019-03-14 DIAGNOSIS — M25.551 CHRONIC RIGHT HIP PAIN: ICD-10-CM

## 2019-03-14 DIAGNOSIS — G89.29 CHRONIC RIGHT HIP PAIN: ICD-10-CM

## 2019-03-14 DIAGNOSIS — M54.50 RIGHT-SIDED LOW BACK PAIN WITHOUT SCIATICA, UNSPECIFIED CHRONICITY: ICD-10-CM

## 2019-03-14 PROCEDURE — 97110 THERAPEUTIC EXERCISES: CPT | Mod: GP | Performed by: PHYSICAL THERAPIST

## 2019-03-14 PROCEDURE — 97530 THERAPEUTIC ACTIVITIES: CPT | Mod: GP | Performed by: PHYSICAL THERAPIST

## 2019-03-14 PROCEDURE — 97162 PT EVAL MOD COMPLEX 30 MIN: CPT | Mod: GP | Performed by: PHYSICAL THERAPIST

## 2019-03-14 NOTE — PROGRESS NOTES
[facility] Initial Evaluation -- Lumbar    Date: March 14, 2019  Treasure Pradhan is a 43 year old female with a lumbar/hip condition.   Referral: Dr. Celestin  Work mechanical stresses:  On feet for long periods of times  Employment status:  Full intes  Leisure mechanical stresses: basketball, walking   Functional disability score HOS: Pt was only given the sports sub-scale by mistake; scored 27.78  VAS score (0-10): 5 today, 8 at worse, 0 at best.  Patient goals/expectations:  Get back to walking/playing with kids w/o pain, walking     HISTORY:    Present symptoms: Pain in R lumbar region into lateral side of R thigh  Pain quality (sharp/shooting/stabbing/aching/burning/cramping):  Aching typically. Stinging/burning at worst  Paresthesia (yes/no):  no    Present since (onset date): July 2018; MD appointment 3/8/2019.     Symptoms (improving/unchanging/worsening):   worsening    Symptoms commenced as a result of: Increased walking exercises last summer   Condition occurred in the following environment:   outdoors     Symptoms at onset (back/thigh/leg): R back/R lat thigh  Constant symptoms (back/thigh/leg): R back/R lat thigh  Intermittent symptoms (back/thigh/leg): N/A    Symptoms are made worse with the following: Always Sitting and Always Walking   Symptoms are made better with the following: Always Lying Supine    Disturbed sleep (yes/no):  Yes 1-2x per night; can change positions and fall back to sleep.   Sleeping postures (prone/sup/side R/L): Supine/prone/L side    Previous episodes (0/1-5/6-10/11+): some pain in same area in high school and a bout of symptoms in June 2015.    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  This numbness has not been altered with her back symptoms.   Previous treatments: none    Specific Questions:  Cough/Sneeze/Strain (pos/neg): neg  Bowel/Bladder (normal/abnormal): normal  Gait (normal/abnormal): abnormal  Medications  (nil/NSAIDS/analg/steroids/anticoag/other):  Anti-depressants, pain meds, anti-inflammatory  Medical allergies:  none  General health (excellent/good/fair/poor):  good  Pertinent medical history:  Asthma, Depression, Migraines/Headaches and Overweight  Imaging (None/Xray/MRI/Other):  none  Recent or major surgery (yes/no):  no  Night pain (yes/no): no  Accidents (yes/no): no  Unexplained weight loss (yes/no): no  Barriers at home: no  Other red flags: no    EXAMINATION    Posture:   Sitting (good/fair/poor): fair  Standing (good/fair/poor):fair  Lordosis (red/acc/normal): normal  Correction of posture (better/worse/no effect): N/A    Lateral Shift (right/left/nil): nil  Relevant (yes/no):  no  Other Observations: no    Neurological:    Motor deficit:  Weakness of R hip 4/5, all of the MMT normal Reflexes: no assessed  Sensory deficit:  good Dural signs:  Not tested    Movement Loss:   Raul Mod Min Nil Pain   Flexion    x none   Extension  x   Increased R low back pain   Side Gliding R    x none   Side Gliding L   x  Increased R low back pain     Test Movements:   During: produces, abolishes, increases, decreases, no effect, centralizing, peripheralizing   After: better, worse, no better, no worse, no effect, centralized, peripheralized    Pretest symptoms standing: central lower back pain   Symptoms During Symptoms After ROM increased ROM decreased No Effect   FIS          Rep FIS          EIS No Effect    No Effect         Rep EIS No Effect    No Effect    Full JEANETTE ROM afterwards w inc LROM     Pretest symptoms lying: R lower back through R lateral thigh 5/10    Symptoms During Symptoms After ROM increased ROM decreased No Effect   SAMI          Rep SAMI          EIL Increases  PDM    No Worse         Rep EIL Centralising    Better    Inc LROM, improved JEANETTE ROM, inc hip flexor MMT     If required, pretest symptoms:      Symptoms During Symptoms After ROM increased ROM decreased No Effect   SGIS - R          Rep  SGIS - R          SGIS - L          Rep SGIS - L            Static Tests:  Sitting slouched:  NT Sitting erect:  NT  Standing slouched NT  Standing erect:  NT  Lying prone in extension:  Decreased/centralized during, better Long sitting:  NT    Other Tests: Hip scour-pain on lat thigh with IR of hip and pain in low back with hip ER and compression.  JEANETTE- +. Improved ROM following treatment    Provisional Classification:  Derangement - Asymmetrical, unilateral, symptoms above knee    Principle of Management:  Education:  Body positioning at work. HEP education.  Discussed proper sitting posture and use of lumbar roll.  Discussed avoidance of repetitive bending, slouched sitting.  Assess baselines before and after activity, exercises.  Discussed centralization/peripheralization.      Equipment provided:  Lumbar roll  Mechanical therapy (Y/N):  yes   Extension principle:  Prone lying in extension 3' followed by EIL 10 reps, If not able to lay down then EIS 10 reps;  HEP 6-8 times per day    Lateral Principle:  N  Flexion principle:  N    Other:  N    ASSESSMENT/PLAN:    Patient is a 43 year old female with lumbar and right side hip complaints.    Patient has the following significant findings with corresponding treatment plan.                Diagnosis 1:  Lumbar radiculopathy  Pain -  self management, education, directional preference exercise and home program  Decreased strength - therapeutic exercise, therapeutic activities and home program  Decreased function - therapeutic activities and home program    Therapy Evaluation Codes:   1) History comprised of:   Personal factors that impact the plan of care:      None.    Comorbidity factors that impact the plan of care are:      Asthma, Depression, Migraines/headaches and Overweight.     Medications impacting care: Anti-depressant, Anti-inflammatory and Pain.  2) Examination of Body Systems comprised of:   Body structures and functions that impact the plan of care:       Hip and Lumbar spine.   Activity limitations that impact the plan of care are:      Bending, Sitting, Walking and Working.  3) Clinical presentation characteristics are:   Patient noted worsening of symptoms  4) Decision-Making    Outcome score is rated at 25% is a high level of disability  Cumulative Therapy Evaluation is: moderate    Previous and current functional limitations:  (See Goal Flow Sheet for this information)    Short term and Long term goals: (See Goal Flow Sheet for this information)     Communication ability:  Patient appears to be able to clearly communicate and understand verbal and written communication and follow directions correctly.  Treatment Explanation - The following has been discussed with the patient:   RX ordered/plan of care  Anticipated outcomes  Possible risks and side effects  This patient would benefit from PT intervention to resume normal activities.   Rehab potential is excellent.    Frequency:  1 X week, once daily  Duration:  for 6 weeks  Discharge Plan:  Achieve all LTG.  Independent in home treatment program.  Reach maximal therapeutic benefit.    Please refer to the daily flowsheet for treatment today, total treatment time and time spent performing 1:1 timed codes.

## 2019-03-22 ENCOUNTER — THERAPY VISIT (OUTPATIENT)
Dept: PHYSICAL THERAPY | Facility: CLINIC | Age: 44
End: 2019-03-22
Payer: COMMERCIAL

## 2019-03-22 DIAGNOSIS — M54.50 ACUTE RIGHT-SIDED LOW BACK PAIN WITHOUT SCIATICA: ICD-10-CM

## 2019-03-22 PROCEDURE — 97110 THERAPEUTIC EXERCISES: CPT | Mod: GP | Performed by: PHYSICAL THERAPIST

## 2019-03-22 PROCEDURE — 97140 MANUAL THERAPY 1/> REGIONS: CPT | Mod: GP | Performed by: PHYSICAL THERAPIST

## 2019-03-28 ENCOUNTER — THERAPY VISIT (OUTPATIENT)
Dept: PHYSICAL THERAPY | Facility: CLINIC | Age: 44
End: 2019-03-28
Payer: COMMERCIAL

## 2019-03-28 DIAGNOSIS — M54.50 ACUTE RIGHT-SIDED LOW BACK PAIN WITHOUT SCIATICA: ICD-10-CM

## 2019-03-28 PROCEDURE — 97140 MANUAL THERAPY 1/> REGIONS: CPT | Mod: GP | Performed by: PHYSICAL THERAPIST

## 2019-03-28 PROCEDURE — 97110 THERAPEUTIC EXERCISES: CPT | Mod: GP | Performed by: PHYSICAL THERAPIST

## 2019-04-04 ENCOUNTER — THERAPY VISIT (OUTPATIENT)
Dept: PHYSICAL THERAPY | Facility: CLINIC | Age: 44
End: 2019-04-04
Payer: COMMERCIAL

## 2019-04-04 DIAGNOSIS — M54.50 ACUTE RIGHT-SIDED LOW BACK PAIN WITHOUT SCIATICA: ICD-10-CM

## 2019-04-04 PROCEDURE — 97110 THERAPEUTIC EXERCISES: CPT | Mod: GP | Performed by: PHYSICAL THERAPIST

## 2019-04-11 ENCOUNTER — THERAPY VISIT (OUTPATIENT)
Dept: PHYSICAL THERAPY | Facility: CLINIC | Age: 44
End: 2019-04-11
Payer: COMMERCIAL

## 2019-04-11 DIAGNOSIS — M54.50 ACUTE RIGHT-SIDED LOW BACK PAIN WITHOUT SCIATICA: ICD-10-CM

## 2019-04-11 PROCEDURE — 97110 THERAPEUTIC EXERCISES: CPT | Mod: GP | Performed by: PHYSICAL THERAPIST

## 2019-04-18 ENCOUNTER — THERAPY VISIT (OUTPATIENT)
Dept: PHYSICAL THERAPY | Facility: CLINIC | Age: 44
End: 2019-04-18
Payer: COMMERCIAL

## 2019-04-18 DIAGNOSIS — M54.50 ACUTE RIGHT-SIDED LOW BACK PAIN WITHOUT SCIATICA: ICD-10-CM

## 2019-04-18 PROCEDURE — 97110 THERAPEUTIC EXERCISES: CPT | Mod: GP | Performed by: PHYSICAL THERAPIST

## 2019-04-19 ENCOUNTER — OFFICE VISIT (OUTPATIENT)
Dept: PEDIATRICS | Facility: CLINIC | Age: 44
End: 2019-04-19
Payer: COMMERCIAL

## 2019-04-19 ENCOUNTER — ANCILLARY PROCEDURE (OUTPATIENT)
Dept: MAMMOGRAPHY | Facility: CLINIC | Age: 44
End: 2019-04-19
Attending: FAMILY MEDICINE
Payer: COMMERCIAL

## 2019-04-19 VITALS
SYSTOLIC BLOOD PRESSURE: 112 MMHG | BODY MASS INDEX: 31.05 KG/M2 | OXYGEN SATURATION: 96 % | HEART RATE: 77 BPM | WEIGHT: 207.2 LBS | DIASTOLIC BLOOD PRESSURE: 81 MMHG | TEMPERATURE: 98.2 F

## 2019-04-19 DIAGNOSIS — Z12.39 BREAST CANCER SCREENING: ICD-10-CM

## 2019-04-19 DIAGNOSIS — F33.0 MILD RECURRENT MAJOR DEPRESSION (H): Primary | ICD-10-CM

## 2019-04-19 DIAGNOSIS — F41.9 ANXIETY: ICD-10-CM

## 2019-04-19 PROCEDURE — 77067 SCR MAMMO BI INCL CAD: CPT | Performed by: RADIOLOGY

## 2019-04-19 PROCEDURE — 77063 BREAST TOMOSYNTHESIS BI: CPT | Performed by: RADIOLOGY

## 2019-04-19 PROCEDURE — 99213 OFFICE O/P EST LOW 20 MIN: CPT | Performed by: FAMILY MEDICINE

## 2019-04-19 RX ORDER — ESCITALOPRAM OXALATE 10 MG/1
20 TABLET ORAL DAILY
Qty: 180 TABLET | Refills: 1 | Status: SHIPPED | OUTPATIENT
Start: 2019-04-19 | End: 2019-10-29

## 2019-04-19 ASSESSMENT — PAIN SCALES - GENERAL: PAINLEVEL: NO PAIN (0)

## 2019-04-19 ASSESSMENT — ANXIETY QUESTIONNAIRES
GAD7 TOTAL SCORE: 13
6. BECOMING EASILY ANNOYED OR IRRITABLE: NEARLY EVERY DAY
1. FEELING NERVOUS, ANXIOUS, OR ON EDGE: NEARLY EVERY DAY
7. FEELING AFRAID AS IF SOMETHING AWFUL MIGHT HAPPEN: SEVERAL DAYS
3. WORRYING TOO MUCH ABOUT DIFFERENT THINGS: SEVERAL DAYS
2. NOT BEING ABLE TO STOP OR CONTROL WORRYING: SEVERAL DAYS
5. BEING SO RESTLESS THAT IT IS HARD TO SIT STILL: NEARLY EVERY DAY

## 2019-04-19 ASSESSMENT — PATIENT HEALTH QUESTIONNAIRE - PHQ9
SUM OF ALL RESPONSES TO PHQ QUESTIONS 1-9: 13
5. POOR APPETITE OR OVEREATING: SEVERAL DAYS

## 2019-04-19 NOTE — PROGRESS NOTES
SUBJECTIVE:   Treasure Pradhan is a 43 year old female who presents to clinic today for the following   health issues:    Depression and Anxiety Follow-Up  Patient is here for medication recheck after starting the Lexapro a month ago.  Symptoms are much improved except patient feels sleepy and groggy few hours during the morning    Status since last visit: Improved     Other associated symptoms:None    Complicating factors:     Significant life event: No     Current substance abuse: None    PHQ 7/15/2016 2/23/2018 3/8/2019   PHQ-9 Total Score 13 13 10   Q9: Thoughts of better off dead/self-harm past 2 weeks Not at all Not at all Not at all     NATANAEL-7 SCORE 7/15/2016 2/23/2018 3/8/2019   Total Score - - -   Total Score 5 17 12     In the past two weeks have you had thoughts of suicide or self-harm?  No.    Do you have concerns about your personal safety or the safety of others?   No  PHQ-9  English  PHQ-9   Any Language  NATANAEL-7  Suicide Assessment Five-step Evaluation and Treatment (SAFE-T)    Amount of exercise or physical activity: None    Problems taking medications regularly: No    Medication side effects: none    Diet: regular (no restrictions)            Additional history: as documented    Reviewed  and updated as needed this visit by clinical staff         Reviewed and updated as needed this visit by Provider         Patient Active Problem List   Diagnosis     Migraine     Anxiety     Mild intermittent asthma without complication     Anal pain     Back pain     Moderate recurrent major depression (H)     Generalized anxiety disorder     Obesity (BMI 30-39.9)     Dyslipidemia     Onychomycosis     Therapeutic drug monitoring     Degeneration of thoracic or thoracolumbar intervertebral disc     DDD (degenerative disc disease), cervical     Migraine without aura and without status migrainosus, not intractable     Encounter for surveillance of other contraceptive     Pigmented skin lesion, midback, 3mm      Chronic allergic rhinitis     Skin lesion, left thigh     Chronic right hip pain     Right-sided low back pain without sciatica     Past Surgical History:   Procedure Laterality Date     SURGICAL HISTORY OF -   2007         SURGICAL HISTORY OF -   3/2006    Rt Foot mass - Perineurioma       Social History     Tobacco Use     Smoking status: Never Smoker     Smokeless tobacco: Never Used   Substance Use Topics     Alcohol use: Yes     Comment: 3-4 drinks per month     Family History   Problem Relation Age of Onset     Heart Disease Father      Prostate Cancer Father      Diabetes Maternal Grandfather      Heart Disease Paternal Grandfather      Genetic Disorder Daughter         At Birth/ [place]         Current Outpatient Medications   Medication Sig Dispense Refill     albuterol (PROAIR HFA) 108 (90 BASE) MCG/ACT Inhaler 2 puffs every 4 hours as needed. 1 Inhaler 1     cyclobenzaprine (FLEXERIL) 10 MG tablet Take 1 tablet (10 mg) by mouth 2 times daily as needed for muscle spasms 30 tablet 1     diclofenac (VOLTAREN) 1 % topical gel Place onto the skin 4 times daily 100 g 1     docusate sodium (COLACE) 50 MG capsule Take 50 mg by mouth 2 times daily       escitalopram (LEXAPRO) 10 MG tablet Take 2 tablets (20 mg) by mouth daily 180 tablet 1     etonogestrel-ethinyl estradiol (NUVARING) 0.12-0.015 MG/24HR vaginal ring PLACE 1 RING VAGINALLY EVERY 21 DAYS THEN REMOVE FOR 1 WEEK AND REPEAT 3 each 4     fexofenadine (ALLEGRA) 180 MG tablet Take 180 mg by mouth daily       MAGNESIUM PO Take 1 tablet by mouth daily       mometasone (NASONEX) 50 MCG/ACT nasal spray Spray 2 sprays into both nostrils daily 1 Box 11     Multiple Vitamins-Minerals (MULTIVITAMIN ADULT PO) Take 1 tablet by mouth daily       nystatin (MYCOSTATIN) 524399 UNIT/GM POWD Use to squirt inside the socks once daily 60 g 3     rizatriptan (MAXALT-MLT) 10 MG ODT Take 1 tablet (10 mg) by mouth See Admin Instructions WITH ONSET OF MIGRAINE, MAY  REPEAT ONCE AFTER 2 HOURS. DO NOT EXCEED 3 TABLETS IN 24 HOURS. 6 tablet 6     venlafaxine (EFFEXOR-XR) 150 MG 24 hr capsule Take 1 capsule (150 mg) by mouth daily 90 capsule 1     Allergies   Allergen Reactions     Terbinafine Itching     Recent Labs   Lab Test 03/07/19  0944 03/03/14  1004 04/24/12  1101   * 146* 145*   HDL 62 46* 43*   TRIG 102 96 63   ALT  --  33  --    CR  --   --  0.69   GFRESTIMATED  --   --  >90   GFRESTBLACK  --   --  >90   POTASSIUM  --   --  4.1   TSH  --   --  0.64      BP Readings from Last 3 Encounters:   04/19/19 112/81   03/08/19 121/85   02/23/18 114/76    Wt Readings from Last 3 Encounters:   04/19/19 94 kg (207 lb 3.2 oz)   03/08/19 95.8 kg (211 lb 3.2 oz)   02/23/18 92 kg (202 lb 12.8 oz)                  Labs reviewed in EPIC    ROS:  CONSTITUTIONAL: NEGATIVE for fever, chills, change in weight  RESP: NEGATIVE for significant cough or SOB  CV: NEGATIVE for chest pain, palpitations or peripheral edema  PSYCHIATRIC: as above    OBJECTIVE:     /81 (BP Location: Right arm, Patient Position: Sitting, Cuff Size: Adult Large)   Pulse 77   Temp 98.2  F (36.8  C) (Oral)   Wt 94 kg (207 lb 3.2 oz)   SpO2 96%   BMI 31.05 kg/m    Body mass index is 31.05 kg/m .  GENERAL: healthy, alert and no distress  RESP: lungs clear to auscultation - no rales, rhonchi or wheezes  CV: regular rate and rhythm, normal S1 S2, no S3 or S4, no murmur, click or rub, no peripheral edema and peripheral pulses strong  PSYCH: mentation appears normal, affect normal/bright    Diagnostic Test Results:  none     ASSESSMENT/PLAN:     Depression; recurrent episode-- Mild   Associated with the following complications:    None   Plan:  The following changes are made -as mentioned below          1. Anxiety  Needing improvement  Recommended to increase her dose of Lexapro from 10 mg to 20 mg once daily in the morning, follow for recheck in 6 months or sooner if needed  Continue with relaxation  techniques.  Start on counseling as we discussed at the last visit  - escitalopram (LEXAPRO) 10 MG tablet; Take 2 tablets (20 mg) by mouth daily  Dispense: 180 tablet; Refill: 1    2. Mild recurrent major depression (H)  as above    - escitalopram (LEXAPRO) 10 MG tablet; Take 2 tablets (20 mg) by mouth daily  Dispense: 180 tablet; Refill: 1    Chart documentation done in part with Dragon Voice recognition Software. Although reviewed after completion, some word and grammatical error may remain.    See Patient Instructions    Nelida Celestin MD  [facility]

## 2019-04-20 ASSESSMENT — ANXIETY QUESTIONNAIRES: GAD7 TOTAL SCORE: 13

## 2019-04-25 ENCOUNTER — THERAPY VISIT (OUTPATIENT)
Dept: PHYSICAL THERAPY | Facility: CLINIC | Age: 44
End: 2019-04-25
Payer: COMMERCIAL

## 2019-04-25 DIAGNOSIS — M54.50 ACUTE RIGHT-SIDED LOW BACK PAIN WITHOUT SCIATICA: ICD-10-CM

## 2019-04-25 PROCEDURE — 97110 THERAPEUTIC EXERCISES: CPT | Mod: GP | Performed by: PHYSICAL THERAPIST

## 2019-05-02 ENCOUNTER — THERAPY VISIT (OUTPATIENT)
Dept: PHYSICAL THERAPY | Facility: CLINIC | Age: 44
End: 2019-05-02
Payer: COMMERCIAL

## 2019-05-02 DIAGNOSIS — M54.50 ACUTE RIGHT-SIDED LOW BACK PAIN WITHOUT SCIATICA: ICD-10-CM

## 2019-05-02 PROCEDURE — 97110 THERAPEUTIC EXERCISES: CPT | Mod: GP | Performed by: PHYSICAL THERAPIST

## 2019-05-02 ASSESSMENT — ACTIVITIES OF DAILY LIVING (ADL)
SITTING_FOR_15_MINUTES: MODERATE DIFFICULTY
STANDING_FOR_15_MINUTES: SLIGHT DIFFICULTY
GOING_DOWN_1_FLIGHT_OF_STAIRS: NO DIFFICULTY AT ALL
PUTTING_ON_SOCKS_AND_SHOES: EXTREME DIFFICULTY
WALKING_15_MINUTES_OR_GREATER: EXTREME DIFFICULTY
HOS_ADL_HIGHEST_POTENTIAL_SCORE: 68
STEPPING_UP_AND_DOWN_CURBS: SLIGHT DIFFICULTY
GOING_UP_1_FLIGHT_OF_STAIRS: SLIGHT DIFFICULTY
LIGHT_TO_MODERATE_WORK: SLIGHT DIFFICULTY
ROLLING_OVER_IN_BED: MODERATE DIFFICULTY
RECREATIONAL_ACTIVITIES: MODERATE DIFFICULTY
HOS_ADL_SCORE(%): 64.71
HOS_ADL_COUNT: 17
GETTING_INTO_AND_OUT_OF_AN_AVERAGE_CAR: MODERATE DIFFICULTY
WALKING_DOWN_STEEP_HILLS: NO DIFFICULTY AT ALL
HOS_ADL_ITEM_SCORE_TOTAL: 44
DEEP_SQUATTING: MODERATE DIFFICULTY
HEAVY_WORK: MODERATE DIFFICULTY
HOW_WOULD_YOU_RATE_YOUR_CURRENT_LEVEL_OF_FUNCTION_DURING_YOUR_USUAL_ACTIVITIES_OF_DAILY_LIVING_FROM_0_TO_100_WITH_100_BEING_YOUR_LEVEL_OF_FUNCTION_PRIOR_TO_YOUR_HIP_PROBLEM_AND_0_BEING_THE_INABILITY_TO_PERFORM_ANY_OF_YOUR_USUAL_DAILY_ACTIVITIES?: 75
WALKING_UP_STEEP_HILLS: MODERATE DIFFICULTY
GETTING_INTO_AND_OUT_OF_A_BATHTUB: NO DIFFICULTY AT ALL
WALKING_APPROXIMATELY_10_MINUTES: MODERATE DIFFICULTY
TWISTING/PIVOTING_ON_INVOLVED_LEG: MODERATE DIFFICULTY
WALKING_INITIALLY: SLIGHT DIFFICULTY

## 2019-05-02 NOTE — PROGRESS NOTES
Subjective:  HPI                    Objective:  System    Physical Exam    General     ROS    Assessment/Plan:    PROGRESS  REPORT    Progress reporting period is from 3/14/2019 to 5/2/2019.       SUBJECTIVE  Subjective changes noted by patient: Patient relates that her R hip and lower back pain are much better, 75% improved.  Finds that performing the R side glides in standing has alleviated the groin and lateral thigh symptoms.  Her symptoms are fairly constant for the R lower back and lateral R hip.  She will gain relief in her symptoms with her home program, but relief is short lived.  Symptoms continue to be worse in the morning and will improve as she gets up and moves about.  Too much activity is not good either.  Sleeping has improved significantly, improved standing tolerance.  Continues to have difficulty with trying to cross the L foot over the R knee and walking > 15 minutes, but these activities are also improving.      Current Pain level: 5/10.     Initial Pain level: 5/10, 7-8/10 at worst.   Changes in function:  Yes (See Goal flowsheet attached for changes in current functional level)  Adverse reaction to treatment or activity: None    OBJECTIVE  Changes noted in objective findings:  Yes, improved LROM, improved hip PROM, improving function   Objective:   LROM EIS major loss w inc PDM, FIS min loss, R SGIS major loss, L SGIS min loss.    Increased Pain During Motion with all LROM.    JEANETTE on the R is limited and painful for the lateral R hip.    Pain R lateral hip with a scour test utilizing minimal pressure.    JEANETTE ROM and LROM were improved with lumbar positioning into extension with a R side glide and R Side glides in standing.  Hip abduction strengthening will increase her pain during the exercise, but does not remain worse afterwards, no loss in motion of the hip or lower back.    Good sitting posture which has improved from poor.     ASSESSMENT/PLAN  Updated problem list and treatment plan:  Diagnosis 1:  Lumbar radiculopathy, R hip pain  Pain -  manual therapy, self management, education, directional preference exercise and home program  Decreased ROM/flexibility - manual therapy, therapeutic exercise and home program  Decreased joint mobility - manual therapy, therapeutic exercise and home program  Decreased strength - therapeutic exercise, therapeutic activities and home program  Decreased proprioception - neuro re-education, therapeutic activities and home program  Decreased function - therapeutic activities and home program  Impaired posture - neuro re-education, therapeutic activities and home program  STG/LTGs have been met or progress has been made towards goals:  Yes (See Goal flow sheet completed today.)  Assessment of Progress: The patient's condition is improving.  Patient is meeting short term goals and is progressing towards long term goals.  Self Management Plans:  Patient has been instructed in a home treatment program.  Patient  has been instructed in self management of symptoms.  I have re-evaluated this patient and find that the nature, scope, duration and intensity of the therapy is appropriate for the medical condition of the patient.  Treasure continues to require the following intervention to meet STG and LTG's:  PT    Recommendations:  This patient would benefit from continued therapy.     Frequency:  1 X week, once daily  Duration:  for up to 6 visits        Please refer to the daily flowsheet for treatment today, total treatment time and time spent performing 1:1 timed codes.

## 2019-05-09 ENCOUNTER — THERAPY VISIT (OUTPATIENT)
Dept: PHYSICAL THERAPY | Facility: CLINIC | Age: 44
End: 2019-05-09
Payer: COMMERCIAL

## 2019-05-09 DIAGNOSIS — M54.50 ACUTE RIGHT-SIDED LOW BACK PAIN WITHOUT SCIATICA: ICD-10-CM

## 2019-05-09 PROCEDURE — 97110 THERAPEUTIC EXERCISES: CPT | Mod: GP | Performed by: PHYSICAL THERAPIST

## 2019-05-16 ENCOUNTER — THERAPY VISIT (OUTPATIENT)
Dept: PHYSICAL THERAPY | Facility: CLINIC | Age: 44
End: 2019-05-16
Payer: COMMERCIAL

## 2019-05-16 DIAGNOSIS — M54.50 ACUTE RIGHT-SIDED LOW BACK PAIN WITHOUT SCIATICA: ICD-10-CM

## 2019-05-16 PROCEDURE — 97110 THERAPEUTIC EXERCISES: CPT | Mod: GP | Performed by: PHYSICAL THERAPIST

## 2019-06-07 ENCOUNTER — THERAPY VISIT (OUTPATIENT)
Dept: PHYSICAL THERAPY | Facility: CLINIC | Age: 44
End: 2019-06-07
Payer: COMMERCIAL

## 2019-06-07 DIAGNOSIS — M54.50 ACUTE RIGHT-SIDED LOW BACK PAIN WITHOUT SCIATICA: ICD-10-CM

## 2019-06-07 PROCEDURE — 97110 THERAPEUTIC EXERCISES: CPT | Mod: GP | Performed by: PHYSICAL THERAPIST

## 2019-06-15 ENCOUNTER — THERAPY VISIT (OUTPATIENT)
Dept: PHYSICAL THERAPY | Facility: CLINIC | Age: 44
End: 2019-06-15
Payer: COMMERCIAL

## 2019-06-15 DIAGNOSIS — M54.50 ACUTE RIGHT-SIDED LOW BACK PAIN WITHOUT SCIATICA: ICD-10-CM

## 2019-06-15 PROCEDURE — 97110 THERAPEUTIC EXERCISES: CPT | Mod: GP | Performed by: PHYSICAL THERAPIST

## 2019-07-06 ENCOUNTER — THERAPY VISIT (OUTPATIENT)
Dept: PHYSICAL THERAPY | Facility: CLINIC | Age: 44
End: 2019-07-06
Payer: COMMERCIAL

## 2019-07-06 DIAGNOSIS — M54.50 ACUTE RIGHT-SIDED LOW BACK PAIN WITHOUT SCIATICA: ICD-10-CM

## 2019-07-06 PROCEDURE — 97110 THERAPEUTIC EXERCISES: CPT | Mod: GP | Performed by: PHYSICAL THERAPIST

## 2019-07-06 ASSESSMENT — ACTIVITIES OF DAILY LIVING (ADL)
RECREATIONAL_ACTIVITIES: EXTREME DIFFICULTY
GOING_DOWN_1_FLIGHT_OF_STAIRS: NO DIFFICULTY AT ALL
ROLLING_OVER_IN_BED: MODERATE DIFFICULTY
WALKING_UP_STEEP_HILLS: SLIGHT DIFFICULTY
LIGHT_TO_MODERATE_WORK: MODERATE DIFFICULTY
TWISTING/PIVOTING_ON_INVOLVED_LEG: MODERATE DIFFICULTY
STEPPING_UP_AND_DOWN_CURBS: SLIGHT DIFFICULTY
DEEP_SQUATTING: EXTREME DIFFICULTY
GOING_UP_1_FLIGHT_OF_STAIRS: MODERATE DIFFICULTY
WALKING_INITIALLY: NO DIFFICULTY AT ALL
SITTING_FOR_15_MINUTES: MODERATE DIFFICULTY
HEAVY_WORK: EXTREME DIFFICULTY
GETTING_INTO_AND_OUT_OF_AN_AVERAGE_CAR: SLIGHT DIFFICULTY
GETTING_INTO_AND_OUT_OF_A_BATHTUB: EXTREME DIFFICULTY
WALKING_DOWN_STEEP_HILLS: SLIGHT DIFFICULTY
HOS_ADL_COUNT: 17
WALKING_15_MINUTES_OR_GREATER: EXTREME DIFFICULTY
STANDING_FOR_15_MINUTES: NO DIFFICULTY AT ALL
HOS_ADL_HIGHEST_POTENTIAL_SCORE: 68
WALKING_APPROXIMATELY_10_MINUTES: MODERATE DIFFICULTY
HOS_ADL_SCORE(%): 57.35
PUTTING_ON_SOCKS_AND_SHOES: SLIGHT DIFFICULTY
HOS_ADL_ITEM_SCORE_TOTAL: 39

## 2019-07-06 NOTE — PROGRESS NOTES
Subjective:  HPI                    Objective:  System    Physical Exam    General     ROS    Assessment/Plan:    PROGRESS  REPORT    Progress reporting period is from 5/2/2019 to 7/6/2019.       SUBJECTIVE  Patient relates that her lower back and R thigh symptoms continue to be better with performing her R SGIS exercises.  However, there has been no change regarding her lateral R hip pain.     She has been going for walks which will cause a worsening of the R lateral hip symptoms within about 15' of walking.  May tend to limp after prolonged walking due to the lateral hip symptoms.  Notes that the initial steps after sitting are painful and she will tend to improve as she walks a little more.    The lateral hip symptoms are constant and is not able to find any relieving factors.  Patient notes that she is functioning at about 70% of her usual activities of daily living.  Current pain level is 1-2/10.  Previous pain level was  5/10   Initial Pain level: 5/10(7-8/10 at worst).   Changes in function:  Yes, overall she is better but the hip symptoms are not improving.  Hip continues to limit walking.   Adverse reaction to treatment or activity: activity - walking.      OBJECTIVE  Changes noted in objective findings:  Yes, improved LROM; no significant change in hip PROM or pain with strength test. Walking continues to be limited by her hip pain.   Objective:   ROM FIS nil loss inc R hip, EIS min loss NE, R SGIS nil loss NE, L SGIS nil loss NE.  Passive hip ROM flexion 90 deg, ER 35 deg, full abd and IR.    Major loss of JEANETTE ROM with pain lateral hip.    Pain with resisted testing for flexion +, abduction++, IR+ and ER+.     No worse with hip abduction strengthening toady.   Repeated lumbar motions did not impact the R hip pain nor ROM.  No worse with trial of strengthening.   HOS score is worse than at evaluation but patient feels today is a more accurate reflection as to her status - 57.35 today;  64.71 at last PN  on 5/2/2019      ASSESSMENT/PLAN  Updated problem list and treatment plan: Diagnosis 1:  R hip pain; lumbar radiculopathy  Pain -  manual therapy, splint/taping/bracing/orthotics, self management, education, directional preference exercise and home program  Decreased ROM/flexibility - manual therapy, therapeutic exercise and home program  Decreased strength - therapeutic exercise, therapeutic activities and home program  Impaired gait - home program  Impaired muscle performance - neuro re-education and home program  Decreased function - therapeutic activities and home program  Impaired posture - neuro re-education, therapeutic activities and home program  STG/LTGs have been met or progress has been made towards goals:  Yes, progress made overall however function for her hip has not been changing over the past month or so.  Assessment of Progress: The patient's progress has slowed.  Self Management Plans:  Patient has been instructed in a home treatment program.  Patient  has been instructed in self management of symptoms.  I have re-evaluated this patient and find that the nature, scope, duration and intensity of the therapy is appropriate for the medical condition of the patient.  Treasure continues to require the following intervention to meet STG and LTG's:  Have patient see Orthopedics for her hip prior to returning to PT as progress is slower than anticipated.      Recommendations:  This patient would benefit from further evaluation.    Please refer to the daily flowsheet for treatment today, total treatment time and time spent performing 1:1 timed codes.

## 2019-08-12 DIAGNOSIS — F33.1 MODERATE RECURRENT MAJOR DEPRESSION (H): ICD-10-CM

## 2019-08-12 RX ORDER — VENLAFAXINE HYDROCHLORIDE 150 MG/1
150 CAPSULE, EXTENDED RELEASE ORAL DAILY
Qty: 90 CAPSULE | Refills: 0 | Status: SHIPPED | OUTPATIENT
Start: 2019-08-12 | End: 2019-11-20

## 2019-08-12 NOTE — TELEPHONE ENCOUNTER
venlafaxine (EFFEXOR-XR) 150 MG 24 hr capsule  Last Written Prescription Date:  3/8/19  Last Fill Quantity: 90,  # refills: 1   Last office visit: 4/19/2019 with prescribing provider:  Nelida Celestin Office Visit:

## 2019-08-22 ENCOUNTER — TELEPHONE (OUTPATIENT)
Dept: PEDIATRICS | Facility: CLINIC | Age: 44
End: 2019-08-22

## 2019-08-22 NOTE — TELEPHONE ENCOUNTER
Left message for patient to return clinic call regarding scheduling. Patient needs a Return  appointment for medication recheck with Nelida Celestin MD on or after 10/19/19. Number to clinic and Mychart option given, please assist in scheduling once patient returns clinic call.     Call Center OKAY TO SCHEDULE.    Thanks,   Mindy Marquez  Primary Care   [facility] [place]

## 2019-09-12 ENCOUNTER — MYC MEDICAL ADVICE (OUTPATIENT)
Dept: PEDIATRICS | Facility: CLINIC | Age: 44
End: 2019-09-12

## 2019-09-12 NOTE — LETTER
September 19, 2019        Treasure Pradhan  [address]  [place] [address]        Dear Treasure Pradhan,    In order to ensure that we are providing the best quality care, we would like to remind you that you are due for a Return appointment with Nelida Celestin MD around 10/19/19 for medication recheck.      We have been unable to reach you by phone or Mychart. Please call your clinic or use [facility] to make an appointment with your provider before you run out of medication. This will prevent a delay in your next refill. Please let us know if you have any questions and we would be happy to help.     Thank you for trusting us with your care.    Sincerely,     [facility] [place] Primary [facility] Team  [phone]

## 2019-09-19 NOTE — TELEPHONE ENCOUNTER
3rd attempt, chart letter created and sent.    Mindy Marquez  Primary Care   [facility] [place]

## 2019-10-19 ENCOUNTER — OFFICE VISIT (OUTPATIENT)
Dept: URGENT CARE | Facility: URGENT CARE | Age: 44
End: 2019-10-19
Payer: COMMERCIAL

## 2019-10-19 VITALS
WEIGHT: 233.4 LBS | OXYGEN SATURATION: 97 % | SYSTOLIC BLOOD PRESSURE: 153 MMHG | BODY MASS INDEX: 34.97 KG/M2 | HEART RATE: 87 BPM | TEMPERATURE: 98.3 F | DIASTOLIC BLOOD PRESSURE: 92 MMHG

## 2019-10-19 DIAGNOSIS — B37.9 ANTIBIOTIC-INDUCED YEAST INFECTION: ICD-10-CM

## 2019-10-19 DIAGNOSIS — T36.95XA ANTIBIOTIC-INDUCED YEAST INFECTION: ICD-10-CM

## 2019-10-19 DIAGNOSIS — J01.90 ACUTE SINUSITIS WITH SYMPTOMS > 10 DAYS: Primary | ICD-10-CM

## 2019-10-19 DIAGNOSIS — J45.20 MILD INTERMITTENT ASTHMA WITHOUT COMPLICATION: ICD-10-CM

## 2019-10-19 PROCEDURE — 99203 OFFICE O/P NEW LOW 30 MIN: CPT | Performed by: NURSE PRACTITIONER

## 2019-10-19 RX ORDER — FLUCONAZOLE 150 MG/1
150 TABLET ORAL ONCE
Qty: 1 TABLET | Refills: 0 | Status: SHIPPED | OUTPATIENT
Start: 2019-10-19 | End: 2019-10-29

## 2019-10-19 ASSESSMENT — ENCOUNTER SYMPTOMS
RHINORRHEA: 1
DIARRHEA: 0
SHORTNESS OF BREATH: 0
NAUSEA: 0
SORE THROAT: 0
FEVER: 0
HEADACHES: 0
CHILLS: 0
COUGH: 1
VOMITING: 0

## 2019-10-19 NOTE — PATIENT INSTRUCTIONS
Patient Education     Acute Bacterial Rhinosinusitis (ABRS)    Acute bacterial rhinosinusitis (ABRS) is an infection of your nasal cavity and sinuses. It s caused by bacteria. Acute means that you ve had symptoms for less than 4 weeks, but possibly up to 12 weeks.  Understanding your sinuses  The nasal cavity is the large air-filled space behind your nose. The sinuses are a group of spaces formed by the bones of your face. They connect with your nasal cavity. ABRS causes the tissue lining these spaces to become inflamed. Mucus may not drain normally. This leads to facial pain and other symptoms.  What causes ABRS?  ABRS most often follows an upper respiratory infection caused by a virus. Bacteria then infect the lining of your nasal cavity and sinuses. But you can also get ABRS if you have:    Nasal allergies    Long-term nasal swelling and congestion not caused by allergies    Blockage in the nose  Symptoms of ABRS  The symptoms of ABRS may be different for each person and include:    Nasal congestion or blockage    Pain or pressure in the face    Thick, colored drainage from the nose  Other symptoms may include:    Runny nose    Fluid draining from the nose down the throat (postnasal drip)    Headache    Cough    Pain    Fever  Diagnosing ABRS  ABRS may be diagnosed if you ve had an upper respiratory infection like a cold and cough for 10 or more days without improvement or with worsening symptoms. Your healthcare provider will ask about your symptoms and your medical history. The provider will check your vital signs, including your temperature. You ll have a physical exam. The healthcare provider will check your ears, nose, and throat. You likely won t need any tests. If ABRS comes back, you may have a culture or other tests.  Treatment for ABRS  Treatment may include:    Antibiotic medicine. This is for symptoms that last for at least 10 to 14 days.    Nasal corticosteroid medicine. Drops or spray used in the  nose can lessen swelling and congestion.    Over-the-counter pain medicine. This is to lessen sinus pain and pressure.    Nasal decongestant medicine. Spray or drops may help to lessen congestion. Do not use them for more than a few days.    Salt wash (saline irrigation). This can help to loosen mucus.  Possible complications of ABRS  ABRS may come back or become long-term (chronic). In rare cases, ABRS may cause complications such as:     Inflamed tissue around the brain and spinal cord (meningitis)    Inflamed tissue around the eyes (orbital cellulitis)    Inflamed bones around the sinuses (osteitis)  These problems may need to be treated in a hospital with intravenous (IV) antibiotic medicine or surgery.  When to call the healthcare provider  Call your healthcare provider if you have any of the following:    Symptoms that don t get better, or get worse    Symptoms that don t get better after 3 to 5 days on antibiotics    Trouble seeing    Swelling around your eyes    Confusion or trouble staying awake   Date Last Reviewed: 5/1/2017 2000-2018 [employer]. [address] [place] [address], [place] [address]. All rights reserved. This information is not intended as a substitute for professional medical care. Always follow your healthcare professional's instructions.

## 2019-10-19 NOTE — PROGRESS NOTES
SUBJECTIVE:   Treasure Pradhan is a 43 year old female presenting with a chief complaint of   Chief Complaint   Patient presents with     Sinus Problem     Patient complains of sinus problems        She is an established patient of [facility].    [facility] Adult    Onset of symptoms was 3 week(s) ago.  Course of illness is worsening.    Severity moderate  Current and Associated symptoms: fever, runny nose, stuffy nose, cough - productive and headache  Treatment measures tried include OTC Cough med.  Predisposing factors include None and HX of asthma.      Review of Systems   Constitutional: Negative for chills and fever.   HENT: Positive for congestion and rhinorrhea. Negative for ear pain and sore throat.    Respiratory: Positive for cough. Negative for shortness of breath.    Gastrointestinal: Negative for diarrhea, nausea and vomiting.   Neurological: Negative for headaches.   All other systems reviewed and are negative.      Past Medical History:   Diagnosis Date     Allergic rhinitis      Asthma, intermittent      Back pain      Depression with anxiety      Hyperlipidemia LDL goal < 160      Migraine      Family History   Problem Relation Age of Onset     Heart Disease Father      Prostate Cancer Father      Diabetes Maternal Grandfather      Heart Disease Paternal Grandfather      Genetic Disorder Daughter         At Birth/ [place]     Current Outpatient Medications   Medication Sig Dispense Refill     albuterol (PROAIR HFA) 108 (90 BASE) MCG/ACT Inhaler 2 puffs every 4 hours as needed. 1 Inhaler 1     amoxicillin-clavulanate (AUGMENTIN) 875-125 MG tablet Take 1 tablet by mouth 2 times daily for 10 days 20 tablet 0     cyclobenzaprine (FLEXERIL) 10 MG tablet Take 1 tablet (10 mg) by mouth 2 times daily as needed for muscle spasms 30 tablet 1     diclofenac (VOLTAREN) 1 % topical gel Place onto the skin 4 times daily 100 g 1     docusate sodium (COLACE) 50 MG capsule Take 50 mg by mouth 2 times daily       escitalopram  (LEXAPRO) 10 MG tablet Take 2 tablets (20 mg) by mouth daily 180 tablet 1     etonogestrel-ethinyl estradiol (NUVARING) 0.12-0.015 MG/24HR vaginal ring PLACE 1 RING VAGINALLY EVERY 21 DAYS THEN REMOVE FOR 1 WEEK AND REPEAT 3 each 4     fexofenadine (ALLEGRA) 180 MG tablet Take 180 mg by mouth daily       fluconazole (DIFLUCAN) 150 MG tablet Take 1 tablet (150 mg) by mouth once for 1 dose 1 tablet 0     MAGNESIUM PO Take 1 tablet by mouth daily       mometasone (NASONEX) 50 MCG/ACT nasal spray Spray 2 sprays into both nostrils daily 1 Box 11     Multiple Vitamins-Minerals (MULTIVITAMIN ADULT PO) Take 1 tablet by mouth daily       nystatin (MYCOSTATIN) 769351 UNIT/GM POWD Use to squirt inside the socks once daily 60 g 3     rizatriptan (MAXALT-MLT) 10 MG ODT Take 1 tablet (10 mg) by mouth See Admin Instructions WITH ONSET OF MIGRAINE, MAY REPEAT ONCE AFTER 2 HOURS. DO NOT EXCEED 3 TABLETS IN 24 HOURS. 6 tablet 6     venlafaxine (EFFEXOR-XR) 150 MG 24 hr capsule Take 1 capsule (150 mg) by mouth daily 90 capsule 0     Social History     Tobacco Use     Smoking status: Never Smoker     Smokeless tobacco: Never Used   Substance Use Topics     Alcohol use: Yes     Comment: 3-4 drinks per month       OBJECTIVE  BP (!) 153/92 (BP Location: Left arm, Patient Position: Chair, Cuff Size: Adult Regular)   Pulse 87   Temp 98.3  F (36.8  C) (Oral)   Wt 105.9 kg (233 lb 6.4 oz)   SpO2 97%   BMI 34.97 kg/m      Physical Exam  Vitals signs and nursing note reviewed.   Constitutional:       Appearance: She is not diaphoretic.   HENT:      Head: Normocephalic and atraumatic.      Right Ear: Tympanic membrane and external ear normal.      Left Ear: Tympanic membrane and external ear normal.      Nose: Mucosal edema, congestion and rhinorrhea present.      Right Sinus: Maxillary sinus tenderness and frontal sinus tenderness present.      Left Sinus: Maxillary sinus tenderness and frontal sinus tenderness present.   Eyes:       Pupils: Pupils are equal, round, and reactive to light.   Neck:      Musculoskeletal: Normal range of motion and neck supple.   Pulmonary:      Effort: Pulmonary effort is normal. No respiratory distress.      Breath sounds: Normal breath sounds.   Lymphadenopathy:      Cervical: No cervical adenopathy.   Skin:     General: Skin is warm and dry.   Neurological:      Mental Status: She is alert.      Cranial Nerves: No cranial nerve deficit.       ASSESSMENT:      ICD-10-CM    1. Acute sinusitis with symptoms > 10 days J01.90 amoxicillin-clavulanate (AUGMENTIN) 875-125 MG tablet   2. Antibiotic-induced yeast infection B37.9 fluconazole (DIFLUCAN) 150 MG tablet    T36.95XA    3. Mild intermittent asthma without complication J45.20         Medical Decision Making:    Differential Diagnosis:  Viral URI,  Allergic rhinitis, Influenza, Pneumonia and Sinusitis    PLAN:  Advised lots of rest and fluids.  Salty water rinses to keep sinuses and nose moist. Antibiotics as prescribed.  RTC if any worsening symptoms or if not improving.  In addition to the above, asthma  was also briefly addressed today. Health maintenance, medications were reviewed. Patient advised to follow up with PCP with any other concerns.                        Patient Instructions       Patient Education     Acute Bacterial Rhinosinusitis (ABRS)    Acute bacterial rhinosinusitis (ABRS) is an infection of your nasal cavity and sinuses. It s caused by bacteria. Acute means that you ve had symptoms for less than 4 weeks, but possibly up to 12 weeks.  Understanding your sinuses  The nasal cavity is the large air-filled space behind your nose. The sinuses are a group of spaces formed by the bones of your face. They connect with your nasal cavity. ABRS causes the tissue lining these spaces to become inflamed. Mucus may not drain normally. This leads to facial pain and other symptoms.  What causes ABRS?  ABRS most often follows an upper respiratory infection  caused by a virus. Bacteria then infect the lining of your nasal cavity and sinuses. But you can also get ABRS if you have:    Nasal allergies    Long-term nasal swelling and congestion not caused by allergies    Blockage in the nose  Symptoms of ABRS  The symptoms of ABRS may be different for each person and include:    Nasal congestion or blockage    Pain or pressure in the face    Thick, colored drainage from the nose  Other symptoms may include:    Runny nose    Fluid draining from the nose down the throat (postnasal drip)    Headache    Cough    Pain    Fever  Diagnosing ABRS  ABRS may be diagnosed if you ve had an upper respiratory infection like a cold and cough for 10 or more days without improvement or with worsening symptoms. Your healthcare provider will ask about your symptoms and your medical history. The provider will check your vital signs, including your temperature. You ll have a physical exam. The healthcare provider will check your ears, nose, and throat. You likely won t need any tests. If ABRS comes back, you may have a culture or other tests.  Treatment for ABRS  Treatment may include:    Antibiotic medicine. This is for symptoms that last for at least 10 to 14 days.    Nasal corticosteroid medicine. Drops or spray used in the nose can lessen swelling and congestion.    Over-the-counter pain medicine. This is to lessen sinus pain and pressure.    Nasal decongestant medicine. Spray or drops may help to lessen congestion. Do not use them for more than a few days.    Salt wash (saline irrigation). This can help to loosen mucus.  Possible complications of ABRS  ABRS may come back or become long-term (chronic). In rare cases, ABRS may cause complications such as:     Inflamed tissue around the brain and spinal cord (meningitis)    Inflamed tissue around the eyes (orbital cellulitis)    Inflamed bones around the sinuses (osteitis)  These problems may need to be treated in a hospital with intravenous  (IV) antibiotic medicine or surgery.  When to call the healthcare provider  Call your healthcare provider if you have any of the following:    Symptoms that don t get better, or get worse    Symptoms that don t get better after 3 to 5 days on antibiotics    Trouble seeing    Swelling around your eyes    Confusion or trouble staying awake   Date Last Reviewed: 5/1/2017 2000-2018 [employer]. [address] [place] [address]. All rights reserved. This information is not intended as a substitute for professional medical care. Always follow your healthcare professional's instructions.

## 2019-10-29 ENCOUNTER — OFFICE VISIT (OUTPATIENT)
Dept: PEDIATRICS | Facility: CLINIC | Age: 44
End: 2019-10-29
Payer: COMMERCIAL

## 2019-10-29 VITALS
BODY MASS INDEX: 34.93 KG/M2 | TEMPERATURE: 97.1 F | DIASTOLIC BLOOD PRESSURE: 98 MMHG | SYSTOLIC BLOOD PRESSURE: 136 MMHG | WEIGHT: 235.8 LBS | HEART RATE: 82 BPM | HEIGHT: 69 IN

## 2019-10-29 DIAGNOSIS — E66.9 OBESITY (BMI 30-39.9): Primary | ICD-10-CM

## 2019-10-29 DIAGNOSIS — F33.1 MODERATE RECURRENT MAJOR DEPRESSION (H): ICD-10-CM

## 2019-10-29 DIAGNOSIS — J45.20 MILD INTERMITTENT ASTHMA WITHOUT COMPLICATION: ICD-10-CM

## 2019-10-29 DIAGNOSIS — I10 ESSENTIAL HYPERTENSION: ICD-10-CM

## 2019-10-29 PROBLEM — E66.01 MORBID OBESITY (H): Status: ACTIVE | Noted: 2019-10-29

## 2019-10-29 PROCEDURE — 99204 OFFICE O/P NEW MOD 45 MIN: CPT | Performed by: INTERNAL MEDICINE

## 2019-10-29 RX ORDER — HYDROCHLOROTHIAZIDE 25 MG/1
25 TABLET ORAL DAILY
Qty: 90 TABLET | Refills: 0 | Status: SHIPPED | OUTPATIENT
Start: 2019-10-29 | End: 2019-11-20

## 2019-10-29 ASSESSMENT — ANXIETY QUESTIONNAIRES
IF YOU CHECKED OFF ANY PROBLEMS ON THIS QUESTIONNAIRE, HOW DIFFICULT HAVE THESE PROBLEMS MADE IT FOR YOU TO DO YOUR WORK, TAKE CARE OF THINGS AT HOME, OR GET ALONG WITH OTHER PEOPLE: SOMEWHAT DIFFICULT
3. WORRYING TOO MUCH ABOUT DIFFERENT THINGS: SEVERAL DAYS
7. FEELING AFRAID AS IF SOMETHING AWFUL MIGHT HAPPEN: NOT AT ALL
1. FEELING NERVOUS, ANXIOUS, OR ON EDGE: MORE THAN HALF THE DAYS
6. BECOMING EASILY ANNOYED OR IRRITABLE: MORE THAN HALF THE DAYS
GAD7 TOTAL SCORE: 11
2. NOT BEING ABLE TO STOP OR CONTROL WORRYING: SEVERAL DAYS
5. BEING SO RESTLESS THAT IT IS HARD TO SIT STILL: NEARLY EVERY DAY

## 2019-10-29 ASSESSMENT — MIFFLIN-ST. JEOR: SCORE: 1786.02

## 2019-10-29 ASSESSMENT — PATIENT HEALTH QUESTIONNAIRE - PHQ9
5. POOR APPETITE OR OVEREATING: MORE THAN HALF THE DAYS
SUM OF ALL RESPONSES TO PHQ QUESTIONS 1-9: 13

## 2019-10-29 NOTE — PATIENT INSTRUCTIONS
Patient Education     Low-Salt Diet  This diet removes foods that are high in salt. It also limits the amount of salt you use when cooking. It is most often used for people with high blood pressure, edema (fluid retention), and kidney, liver, or heart disease.  Table salt contains the mineral sodium. Your body needs sodium to work normally. But too much sodium can make your health problems worse. Your healthcare provider is recommending a low-salt (also called low-sodium) diet for you. Your total daily allowance of salt is 1,500 to 2,300 milligrams (mg). It is less than 1 teaspoon of table salt. This means you can have only about 500 to 700 mg of sodium at each meal. People with certain health problems should limit salt intake to the lower end of the recommended range.    When you cook, don t add much salt. If you can cook without using salt, even better. Don t add salt to your food at the table.  When shopping, read food labels. Salt is often called sodium on the label. Choose foods that are salt-free, low salt, or very low salt. Note that foods with reduced salt may not lower your salt intake enough.    Beans, potatoes, and pasta  Ok: Dry beans, split peas, lentils, potatoes, rice, macaroni, pasta, spaghetti without added salt  Avoid: Potato chips, tortilla chips, and similar products  Breads and cereals  Ok: Low-sodium breads, rolls, cereals, and cakes; low-salt crackers, matzo crackers  Avoid: Salted crackers, pretzels, popcorn, Belarusian toast, pancakes, muffins  Dairy  Ok: Milk, chocolate milk, hot chocolate mix, low-salt cheeses, and yogurt  Avoid: Processed cheese and cheese spreads; Roquefort, Camembert, and cottage cheese; buttermilk, instant breakfast drink  Desserts  Ok: Ice cream, frozen yogurt, juice bars, gelatin, cookies and pies, sugar, honey, jelly, hard candy  Avoid: Most pies, cakes and cookies prepared or processed with salt; instant pudding  Drinks  Ok: Tea, coffee, fizzy (carbonated) drinks,  juices  Avoid: Flavored coffees, electrolyte replacement drinks, sports drinks  Meats  Ok: All fresh meat, fish, poultry, low-salt tuna, eggs, egg substitute  Avoid: Smoked, pickled, brine-cured, or salted meats and fish. This includes garrison, chipped beef, corned beef, hot dogs, deli meats, ham, kosher meats, salt pork, sausage, canned tuna, salted codfish, smoked salmon, herring, sardines, or anchovies.  Seasonings and spices  Ok: Most seasonings are okay. Good substitutes for salt include: fresh herb blends, hot sauce, lemon, garlic, murdock, vinegar, dry mustard, parsley, cilantro, horseradish, tomato paste, regular margarine, mayonnaise, unsalted butter, cream cheese, vegetable oil, cream, low-salt salad dressing and gravy.  Avoid: Regular ketchup, relishes, pickles, soy sauce, teriyaki sauce, Worcestershire sauce, BBQ sauce, tartar sauce, meat tenderizer, chili sauce, regular gravy, regular salad dressing, salted butter  Soups  Ok: Low-salt soups and broths made with allowed foods  Avoid: Bouillon cubes, soups with smoked or salted meats, regular soup and broth  Vegetables  Ok: Most vegetables are okay; also low-salt tomato and vegetable juices  Avoid: Sauerkraut and other brine-soaked vegetables; pickles and other pickled vegetables; tomato juice, olives  Date Last Reviewed: 8/1/2016 2000-2018 [employer]. [address] [place] [id number]. All rights reserved. This information is not intended as a substitute for professional medical care. Always follow your healthcare professional's instructions.

## 2019-10-29 NOTE — PROGRESS NOTES
Subjective     Treasure Pradhan is a 43 year old female who presents to clinic today for the following health issues:    HPI   ED/UC Followup:    Facility:  [facility]  Date of visit: 10/19/19  Reason for visit: Acute Sinusitis//92 at urgent care  Current Status: Patient states the last 2 days she has felt better but she has noticed her blood pressure has been elevated the last 2 weeks, around 130's-150's/92-99.  Patient has had constant headaches for the last 2 weeks, left side seems to hurt more.     43-year-old young lady presents with concern for elevated blood pressure.  She has been checking it frequently lately and her diastolic blood pressure consistently been above 90.  Anywhere between 90-98.  She is a pharmacist.  3 weeks ago she had a head cold and symptoms persisted so she ended up going to urgent care and got treated for sinusitis with Augmentin.  She is finishing up the antibiotic.  She also had associated occipital and left-sided headache which have improved after starting antibiotic.  She does have history of migraine.  She has mild asthma which has been stable.  She has not been using Allegra.  She has gained 20 pounds in the past few months.        Patient Active Problem List   Diagnosis     Migraine     Anxiety     Mild intermittent asthma without complication     Anal pain     Back pain     Moderate recurrent major depression (H)     Generalized anxiety disorder     Dyslipidemia     Onychomycosis     Therapeutic drug monitoring     Degeneration of thoracic or thoracolumbar intervertebral disc     DDD (degenerative disc disease), cervical     Migraine without aura and without status migrainosus, not intractable     Encounter for surveillance of other contraceptive     Pigmented skin lesion, midback, 3mm     Chronic allergic rhinitis     Skin lesion, left thigh     Chronic right hip pain     Right-sided low back pain without sciatica     Essential hypertension     Obesity  (BMI 35.0-39.9) with comorbidity (H)     Past Surgical History:   Procedure Laterality Date     SURGICAL HISTORY OF -   2007         SURGICAL HISTORY OF -   3/2006    Rt Foot mass - Perineurioma       Social History     Tobacco Use     Smoking status: Never Smoker     Smokeless tobacco: Never Used   Substance Use Topics     Alcohol use: Yes     Comment: 3-4 drinks per month     Family History   Problem Relation Age of Onset     Heart Disease Father      Prostate Cancer Father      Diabetes Maternal Grandfather      Heart Disease Paternal Grandfather      Genetic Disorder Daughter         At Birth/ [place]         Current Outpatient Medications   Medication Sig Dispense Refill     albuterol (PROAIR HFA) 108 (90 BASE) MCG/ACT Inhaler 2 puffs every 4 hours as needed. 1 Inhaler 1     amoxicillin-clavulanate (AUGMENTIN) 875-125 MG tablet Take 1 tablet by mouth 2 times daily for 10 days 20 tablet 0     cyclobenzaprine (FLEXERIL) 10 MG tablet Take 1 tablet (10 mg) by mouth 2 times daily as needed for muscle spasms 30 tablet 1     diclofenac (VOLTAREN) 1 % topical gel Place onto the skin 4 times daily 100 g 1     docusate sodium (COLACE) 50 MG capsule Take 50 mg by mouth 2 times daily       etonogestrel-ethinyl estradiol (NUVARING) 0.12-0.015 MG/24HR vaginal ring PLACE 1 RING VAGINALLY EVERY 21 DAYS THEN REMOVE FOR 1 WEEK AND REPEAT 3 each 4     fexofenadine (ALLEGRA) 180 MG tablet Take 180 mg by mouth daily       hydrochlorothiazide (HYDRODIURIL) 25 MG tablet Take 1 tablet (25 mg) by mouth daily 90 tablet 0     MAGNESIUM PO Take 1 tablet by mouth daily       mometasone (NASONEX) 50 MCG/ACT nasal spray Spray 2 sprays into both nostrils daily 1 Box 11     Multiple Vitamins-Minerals (MULTIVITAMIN ADULT PO) Take 1 tablet by mouth daily       nystatin (MYCOSTATIN) 097981 UNIT/GM POWD Use to squirt inside the socks once daily 60 g 3     rizatriptan (MAXALT-MLT) 10 MG ODT Take 1 tablet (10 mg) by mouth See Admin  "Instructions WITH ONSET OF MIGRAINE, MAY REPEAT ONCE AFTER 2 HOURS. DO NOT EXCEED 3 TABLETS IN 24 HOURS. 6 tablet 6     venlafaxine (EFFEXOR-XR) 150 MG 24 hr capsule Take 1 capsule (150 mg) by mouth daily 90 capsule 0     Allergies   Allergen Reactions     Terbinafine Itching     BP Readings from Last 3 Encounters:   10/29/19 (!) 136/98   10/19/19 (!) 153/92   04/19/19 112/81    Wt Readings from Last 3 Encounters:   10/29/19 107 kg (235 lb 12.8 oz)   10/19/19 105.9 kg (233 lb 6.4 oz)   04/19/19 94 kg (207 lb 3.2 oz)                      Reviewed and updated as needed this visit by Provider         Review of Systems   ROS COMP: Constitutional, HEENT, cardiovascular, pulmonary, GI, , musculoskeletal, neuro, skin, endocrine and psych systems are negative, except as otherwise noted.      Objective    BP (!) 136/98 (BP Location: Left arm, Patient Position: Sitting, Cuff Size: Adult Large)   Pulse 82   Temp 97.1  F (36.2  C) (Temporal)   Ht 1.74 m (5' 8.5\")   Wt 107 kg (235 lb 12.8 oz)   LMP  (LMP Unknown)   BMI 35.33 kg/m    Body mass index is 35.33 kg/m .  Physical Exam   GENERAL: healthy, alert and no distress  NECK: no adenopathy, no asymmetry, masses, or scars and thyroid normal to palpation  RESP: lungs clear to auscultation - no rales, rhonchi or wheezes  CV: regular rate and rhythm, normal S1 S2, no S3 or S4, no murmur, click or rub, no peripheral edema and peripheral pulses strong  ABDOMEN: soft, nontender, no hepatosplenomegaly, no masses and bowel sounds normal  MS: no gross musculoskeletal defects noted, no edema    Diagnostic Test Results:  Labs reviewed in Epic          Assessment & Plan     1.  Essential hypertension.  Mostly diastolic hypertension.  Discussed low-salt diet.  Weight reduction discussed.  Decided to start on hydrochlorothiazide 25 mg a day.  She will return to see Dr. Celestin in 1 month with BMP and TSH.  2.  Obesity class II with recent 20 pound weight gain.  TSH to be checked when " "she returns.  3.  Mild asthma stable  4.  Major recurrent depression of long-standing been treated with Effexor.     BMI:   Estimated body mass index is 35.33 kg/m  as calculated from the following:    Height as of this encounter: 1.74 m (5' 8.5\").    Weight as of this encounter: 107 kg (235 lb 12.8 oz).   Weight management plan: Discussed healthy diet and exercise guidelines            No follow-ups on file.    Tyrone Krishna MD  [facility]      "

## 2019-10-30 ASSESSMENT — ANXIETY QUESTIONNAIRES: GAD7 TOTAL SCORE: 11

## 2019-11-07 ENCOUNTER — HEALTH MAINTENANCE LETTER (OUTPATIENT)
Age: 44
End: 2019-11-07

## 2019-11-08 DIAGNOSIS — I10 ESSENTIAL HYPERTENSION: Primary | ICD-10-CM

## 2019-11-08 DIAGNOSIS — Z13.29 SCREENING FOR THYROID DISORDER: ICD-10-CM

## 2019-11-20 ENCOUNTER — OFFICE VISIT (OUTPATIENT)
Dept: PEDIATRICS | Facility: CLINIC | Age: 44
End: 2019-11-20
Payer: COMMERCIAL

## 2019-11-20 VITALS
HEART RATE: 82 BPM | SYSTOLIC BLOOD PRESSURE: 137 MMHG | OXYGEN SATURATION: 95 % | BODY MASS INDEX: 35.66 KG/M2 | DIASTOLIC BLOOD PRESSURE: 89 MMHG | TEMPERATURE: 98.2 F | WEIGHT: 238 LBS

## 2019-11-20 DIAGNOSIS — I10 ESSENTIAL HYPERTENSION: ICD-10-CM

## 2019-11-20 DIAGNOSIS — J45.20 INTERMITTENT ASTHMA, UNCOMPLICATED: ICD-10-CM

## 2019-11-20 DIAGNOSIS — F33.1 MODERATE RECURRENT MAJOR DEPRESSION (H): ICD-10-CM

## 2019-11-20 DIAGNOSIS — Z23 NEED FOR PROPHYLACTIC VACCINATION AND INOCULATION AGAINST INFLUENZA: ICD-10-CM

## 2019-11-20 DIAGNOSIS — F41.9 ANXIETY: Primary | ICD-10-CM

## 2019-11-20 DIAGNOSIS — Z13.29 SCREENING FOR THYROID DISORDER: ICD-10-CM

## 2019-11-20 DIAGNOSIS — E66.9 OBESITY (BMI 30-39.9): ICD-10-CM

## 2019-11-20 DIAGNOSIS — G43.709 CHRONIC MIGRAINE WITHOUT AURA WITHOUT STATUS MIGRAINOSUS, NOT INTRACTABLE: ICD-10-CM

## 2019-11-20 LAB
ANION GAP SERPL CALCULATED.3IONS-SCNC: 5 MMOL/L (ref 3–14)
BUN SERPL-MCNC: 25 MG/DL (ref 7–30)
CALCIUM SERPL-MCNC: 9.6 MG/DL (ref 8.5–10.1)
CHLORIDE SERPL-SCNC: 104 MMOL/L (ref 94–109)
CO2 SERPL-SCNC: 29 MMOL/L (ref 20–32)
CREAT SERPL-MCNC: 0.69 MG/DL (ref 0.52–1.04)
CREAT UR-MCNC: 25 MG/DL
GFR SERPL CREATININE-BSD FRML MDRD: >90 ML/MIN/{1.73_M2}
GLUCOSE SERPL-MCNC: 91 MG/DL (ref 70–99)
MICROALBUMIN UR-MCNC: <5 MG/L
MICROALBUMIN/CREAT UR: NORMAL MG/G CR (ref 0–25)
POTASSIUM SERPL-SCNC: 3.8 MMOL/L (ref 3.4–5.3)
SODIUM SERPL-SCNC: 138 MMOL/L (ref 133–144)
TSH SERPL DL<=0.005 MIU/L-ACNC: 0.94 MU/L (ref 0.4–4)

## 2019-11-20 PROCEDURE — 82043 UR ALBUMIN QUANTITATIVE: CPT | Performed by: FAMILY MEDICINE

## 2019-11-20 PROCEDURE — 90686 IIV4 VACC NO PRSV 0.5 ML IM: CPT | Performed by: FAMILY MEDICINE

## 2019-11-20 PROCEDURE — 80048 BASIC METABOLIC PNL TOTAL CA: CPT | Performed by: FAMILY MEDICINE

## 2019-11-20 PROCEDURE — 90471 IMMUNIZATION ADMIN: CPT | Performed by: FAMILY MEDICINE

## 2019-11-20 PROCEDURE — 84443 ASSAY THYROID STIM HORMONE: CPT | Performed by: FAMILY MEDICINE

## 2019-11-20 PROCEDURE — 99214 OFFICE O/P EST MOD 30 MIN: CPT | Mod: 25 | Performed by: FAMILY MEDICINE

## 2019-11-20 PROCEDURE — 36415 COLL VENOUS BLD VENIPUNCTURE: CPT | Performed by: FAMILY MEDICINE

## 2019-11-20 RX ORDER — ALBUTEROL SULFATE 90 UG/1
AEROSOL, METERED RESPIRATORY (INHALATION)
Qty: 1 INHALER | Refills: 1 | Status: SHIPPED | OUTPATIENT
Start: 2019-11-20 | End: 2020-09-22

## 2019-11-20 RX ORDER — HYDROCHLOROTHIAZIDE 25 MG/1
25 TABLET ORAL DAILY
Qty: 90 TABLET | Refills: 1 | Status: SHIPPED | OUTPATIENT
Start: 2019-11-20 | End: 2020-01-08 | Stop reason: ALTCHOICE

## 2019-11-20 RX ORDER — RIZATRIPTAN BENZOATE 10 MG/1
10 TABLET, ORALLY DISINTEGRATING ORAL SEE ADMIN INSTRUCTIONS
Qty: 6 TABLET | Refills: 6 | Status: SHIPPED | OUTPATIENT
Start: 2019-11-20 | End: 2020-09-22

## 2019-11-20 RX ORDER — IBUPROFEN 200 MG
200-400 TABLET ORAL EVERY 4 HOURS PRN
COMMUNITY

## 2019-11-20 RX ORDER — CITALOPRAM HYDROBROMIDE 20 MG/1
20 TABLET ORAL DAILY
Qty: 30 TABLET | Refills: 1 | Status: SHIPPED | OUTPATIENT
Start: 2019-11-20 | End: 2020-01-08

## 2019-11-20 RX ORDER — VENLAFAXINE HYDROCHLORIDE 150 MG/1
150 CAPSULE, EXTENDED RELEASE ORAL DAILY
Qty: 30 CAPSULE | Refills: 0 | Status: SHIPPED | OUTPATIENT
Start: 2019-11-20 | End: 2019-11-20 | Stop reason: ALTCHOICE

## 2019-11-20 ASSESSMENT — ANXIETY QUESTIONNAIRES
GAD7 TOTAL SCORE: 14
7. FEELING AFRAID AS IF SOMETHING AWFUL MIGHT HAPPEN: SEVERAL DAYS
1. FEELING NERVOUS, ANXIOUS, OR ON EDGE: NEARLY EVERY DAY
5. BEING SO RESTLESS THAT IT IS HARD TO SIT STILL: NEARLY EVERY DAY
3. WORRYING TOO MUCH ABOUT DIFFERENT THINGS: SEVERAL DAYS
2. NOT BEING ABLE TO STOP OR CONTROL WORRYING: SEVERAL DAYS
6. BECOMING EASILY ANNOYED OR IRRITABLE: MORE THAN HALF THE DAYS

## 2019-11-20 ASSESSMENT — PATIENT HEALTH QUESTIONNAIRE - PHQ9
SUM OF ALL RESPONSES TO PHQ QUESTIONS 1-9: 14
5. POOR APPETITE OR OVEREATING: NEARLY EVERY DAY

## 2019-11-20 ASSESSMENT — PAIN SCALES - GENERAL: PAINLEVEL: NO PAIN (0)

## 2019-11-20 NOTE — PROGRESS NOTES
Subjective     Treasure Pradhan is a 43 year old female who presents to clinic today for the following health issues:    HPI   Flu Vaccine - offered, patient will do today.    Hypertension Follow-up    Patient is here for blood pressure recheck after being started on hydrochlorothiazide for hypertension by Dr. Krishna few weeks ago  Patient denies medication side effects  Denies snoring, alcohol use  Trying to limit her caffeine and diet soda    Do you check your blood pressure regularly outside of the clinic? Yes     Are you following a low salt diet? Tries To    Are your blood pressures ever more than 140 on the top number (systolic) OR more   than 90 on the bottom number (diastolic), for example 140/90? Yes    Depression and Anxiety Follow-Up    How are you doing with your depression since your last visit? No change    How are you doing with your anxiety since your last visit?  No change    Are you having other symptoms that might be associated with depression or anxiety? No    Have you had a significant life event? No     Do you have any concerns with your use of alcohol or other drugs? No    Social History     Tobacco Use     Smoking status: Never Smoker     Smokeless tobacco: Never Used   Substance Use Topics     Alcohol use: Yes     Comment: 3-4 drinks per month     Drug use: No     PHQ 3/8/2019 4/19/2019 10/29/2019   PHQ-9 Total Score 10 13 13   Q9: Thoughts of better off dead/self-harm past 2 weeks Not at all Not at all Not at all     NATANAEL-7 SCORE 3/8/2019 4/19/2019 10/29/2019   Total Score - - -   Total Score 12 13 11     Last PHQ-9 10/29/2019   1.  Little interest or pleasure in doing things 2   2.  Feeling down, depressed, or hopeless 0   3.  Trouble falling or staying asleep, or sleeping too much 2   4.  Feeling tired or having little energy 3   5.  Poor appetite or overeating 2   6.  Feeling bad about yourself 0   7.  Trouble concentrating 1   8.  Moving slowly or restless 3   Q9: Thoughts of better off  dead/self-harm past 2 weeks 0   PHQ-9 Total Score 13   Difficulty at work, home, or with people Somewhat difficult     NATANAEL-7  10/29/2019   1. Feeling nervous, anxious, or on edge 2   2. Not being able to stop or control worrying 1   3. Worrying too much about different things 1   4. Trouble relaxing 2   5. Being so restless that it is hard to sit still 3   6. Becoming easily annoyed or irritable 2   7. Feeling afraid, as if something awful might happen 0   NATANAEL-7 Total Score 11   If you checked any problems, how difficult have they made it for you to do your work, take care of things at home, or get along with other people? Somewhat difficult     In the past two weeks have you had thoughts of suicide or self-harm?  No.    Do you have concerns about your personal safety or the safety of others?   No    Suicide Assessment Five-step Evaluation and Treatment (SAFE-T)      Asthma Follow-Up  Was ACT completed today?    Yes    ACT Total Scores 3/8/2019   ACT TOTAL SCORE -   ASTHMA ER VISITS -   ASTHMA HOSPITALIZATIONS -   ACT TOTAL SCORE (Goal Greater than or Equal to 20) 24   In the past 12 months, how many times did you visit the emergency room for your asthma without being admitted to the hospital? 0   In the past 12 months, how many times were you hospitalized overnight because of your asthma? 0       How many days per week do you miss taking your asthma controller medication?  I do not have an asthma controller medication    Please describe any recent triggers for your asthma: None    Have you had any Emergency Room Visits, Urgent Care Visits, or Hospital Admissions since your last office visit?  No        How many servings of fruits and vegetables do you eat daily?  4 or more    On average, how many sweetened beverages do you drink each day (soda, juice, sweet tea, etc)?   0    How many days per week do you miss taking your medication? 0        Patient Active Problem List   Diagnosis     Migraine     Anxiety     Mild  intermittent asthma without complication     Anal pain     Back pain     Moderate recurrent major depression (H)     Generalized anxiety disorder     Dyslipidemia     Onychomycosis     Therapeutic drug monitoring     Degeneration of thoracic or thoracolumbar intervertebral disc     DDD (degenerative disc disease), cervical     Migraine without aura and without status migrainosus, not intractable     Encounter for surveillance of other contraceptive     Pigmented skin lesion, midback, 3mm     Chronic allergic rhinitis     Skin lesion, left thigh     Chronic right hip pain     Right-sided low back pain without sciatica     Essential hypertension     Obesity (BMI 35.0-39.9) with comorbidity (H)     Past Surgical History:   Procedure Laterality Date     SURGICAL HISTORY OF -   2007         SURGICAL HISTORY OF -   3/2006    Rt Foot mass - Perineurioma       Social History     Tobacco Use     Smoking status: Never Smoker     Smokeless tobacco: Never Used   Substance Use Topics     Alcohol use: Yes     Comment: 3-4 drinks per month     Family History   Problem Relation Age of Onset     Heart Disease Father      Prostate Cancer Father      Diabetes Maternal Grandfather      Heart Disease Paternal Grandfather      Genetic Disorder Daughter         At Birth/ [place]         Current Outpatient Medications   Medication Sig Dispense Refill     albuterol (PROAIR HFA) 108 (90 Base) MCG/ACT inhaler 2 puffs every 4 hours as needed. 1 Inhaler 1     aspirin-acetaminophen-caffeine (EXCEDRIN MIGRAINE) 250-250-65 MG tablet Take 1-2 tablets by mouth every 6 hours as needed for headaches       citalopram (CELEXA) 20 MG tablet Take 1 tablet (20 mg) by mouth daily 30 tablet 1     cyclobenzaprine (FLEXERIL) 10 MG tablet Take 1 tablet (10 mg) by mouth 2 times daily as needed for muscle spasms 30 tablet 1     diclofenac (VOLTAREN) 1 % topical gel Place onto the skin 4 times daily (Patient taking differently: Place onto the skin 4  times daily as needed ) 100 g 1     docusate sodium (COLACE) 50 MG capsule Take 50 mg by mouth 2 times daily as needed        etonogestrel-ethinyl estradiol (NUVARING) 0.12-0.015 MG/24HR vaginal ring PLACE 1 RING VAGINALLY EVERY 21 DAYS THEN REMOVE FOR 1 WEEK AND REPEAT 3 each 4     fexofenadine (ALLEGRA) 180 MG tablet Take 180 mg by mouth daily       hydrochlorothiazide (HYDRODIURIL) 25 MG tablet Take 1 tablet (25 mg) by mouth daily 90 tablet 1     ibuprofen (ADVIL/MOTRIN) 200 MG tablet Take 200-400 mg by mouth every 4 hours as needed for mild pain       MAGNESIUM PO Take 1 tablet by mouth daily       melatonin 5 MG tablet Take 5 mg by mouth At Bedtime       mometasone (NASONEX) 50 MCG/ACT nasal spray Spray 2 sprays into both nostrils daily (Patient taking differently: Spray 2 sprays into both nostrils daily as needed ) 1 Box 11     Multiple Vitamins-Minerals (MULTIVITAMIN ADULT PO) Take 1 tablet by mouth daily       nystatin (MYCOSTATIN) 625843 UNIT/GM POWD Use to squirt inside the socks once daily (Patient taking differently: Use to squirt inside the socks once daily PRN) 60 g 3     rizatriptan (MAXALT-MLT) 10 MG ODT Take 1 tablet (10 mg) by mouth See Admin Instructions WITH ONSET OF MIGRAINE, MAY REPEAT ONCE AFTER 2 HOURS. DO NOT EXCEED 3 TABLETS IN 24 HOURS. 6 tablet 6     venlafaxine (EFFEXOR-XR) 150 MG 24 hr capsule Take 1 capsule (150 mg) by mouth daily 30 capsule 0     Allergies   Allergen Reactions     Terbinafine Itching     Recent Labs   Lab Test 03/07/19  0944 03/03/14  1004 04/24/12  1101   * 146* 145*   HDL 62 46* 43*   TRIG 102 96 63   ALT  --  33  --    CR  --   --  0.69   GFRESTIMATED  --   --  >90   GFRESTBLACK  --   --  >90   POTASSIUM  --   --  4.1   TSH  --   --  0.64      BP Readings from Last 3 Encounters:   11/20/19 137/89   10/29/19 (!) 136/98   10/19/19 (!) 153/92    Wt Readings from Last 3 Encounters:   11/20/19 108 kg (238 lb)   10/29/19 107 kg (235 lb 12.8 oz)   10/19/19 105.9 kg  (233 lb 6.4 oz)                      Reviewed and updated as needed this visit by Provider         Review of Systems   ROS COMP: CONSTITUTIONAL: NEGATIVE for fever, chills, change in weight  INTEGUMENTARY/SKIN: NEGATIVE for worrisome rashes, moles or lesions  EYES: NEGATIVE for vision changes or irritation  RESP: NEGATIVE for significant cough or SOB  RESP:Hx asthma  CV: NEGATIVE for chest pain, palpitations or peripheral edema  CV: Hx HTN  GI: NEGATIVE for nausea, abdominal pain, heartburn, or change in bowel habits  MUSCULOSKELETAL: NEGATIVE for significant arthralgias or myalgia  NEURO: NEGATIVE for weakness, dizziness or paresthesias and Hx headaches-migraine  ENDOCRINE: NEGATIVE for temperature intolerance, skin/hair changes  HEME/ALLERGY/IMMUNE: NEGATIVE for bleeding problems  PSYCHIATRIC: History of anxiety and depression      Objective    /89 (BP Location: Right arm, Patient Position: Sitting, Cuff Size: Adult Large)   Pulse 82   Temp 98.2  F (36.8  C) (Oral)   Wt 108 kg (238 lb)   LMP  (LMP Unknown)   SpO2 95%   BMI 35.66 kg/m    Body mass index is 35.66 kg/m .  Physical Exam   GENERAL: healthy, alert and no distress  NECK: no adenopathy, no asymmetry, masses, or scars and thyroid normal to palpation  RESP: lungs clear to auscultation - no rales, rhonchi or wheezes  CV: regular rate and rhythm, normal S1 S2, no S3 or S4, no murmur, click or rub, no peripheral edema and peripheral pulses strong  MS: no gross musculoskeletal defects noted, no edema  SKIN: no suspicious lesions or rashes  NEURO: Normal strength and tone, mentation intact and speech normal  PSYCH: mentation appears normal, affect normal/bright    Diagnostic Test Results:  Labs reviewed in Epic        Assessment & Plan     1. Essential hypertension  BP Readings from Last 6 Encounters:   11/20/19 137/89   10/29/19 (!) 136/98   10/19/19 (!) 153/92   04/19/19 112/81   03/08/19 121/85   02/23/18 114/76     Blood pressure is at goal but  needs improvement  Emphasized on weight loss, low-salt diet, regular exercises  Patient will get lab recheck on BMP  Results for orders placed or performed in visit on 11/20/19   Albumin Random Urine Quantitative with Creat Ratio     Status: None   Result Value Ref Range    Creatinine Urine 25 mg/dL    Albumin Urine mg/L <5 mg/L    Albumin Urine mg/g Cr Unable to calculate due to low value 0 - 25 mg/g Cr   **Basic metabolic panel FUTURE anytime     Status: None   Result Value Ref Range    Sodium 138 133 - 144 mmol/L    Potassium 3.8 3.4 - 5.3 mmol/L    Chloride 104 94 - 109 mmol/L    Carbon Dioxide 29 20 - 32 mmol/L    Anion Gap 5 3 - 14 mmol/L    Glucose 91 70 - 99 mg/dL    Urea Nitrogen 25 7 - 30 mg/dL    Creatinine 0.69 0.52 - 1.04 mg/dL    GFR Estimate >90 >60 mL/min/[1.73_m2]    GFR Estimate If Black >90 >60 mL/min/[1.73_m2]    Calcium 9.6 8.5 - 10.1 mg/dL   **TSH with free T4 reflex FUTURE anytime     Status: None   Result Value Ref Range    TSH 0.94 0.40 - 4.00 mU/L     Reviewed normal BMP lab results  Continue with hydrochlorthiazide 25 mg daily, recheck in 6 months or sooner if needed  - hydrochlorothiazide (HYDRODIURIL) 25 MG tablet; Take 1 tablet (25 mg) by mouth daily  Dispense: 90 tablet; Refill: 1    2. Chronic migraine without aura without status migrainosus, not intractable  Stable, continue to avoid potential triggers for migraine, Maxalt as needed  - rizatriptan (MAXALT-MLT) 10 MG ODT; Take 1 tablet (10 mg) by mouth See Admin Instructions WITH ONSET OF MIGRAINE, MAY REPEAT ONCE AFTER 2 HOURS. DO NOT EXCEED 3 TABLETS IN 24 HOURS.  Dispense: 6 tablet; Refill: 6    3. Moderate recurrent major depression (H)  Reviewed PHQ 9, patient's symptoms are not under control with the current dose of Effexor.  She has tried Lexapro in the past with side effects has tried Zoloft for several years ago  , Unsure if it was helpful at the time  Will start taking Effexor 150 mg every other day for 2 weeks followed by  "twice a week for 2 weeks and then stop.  Will start taking Celexa 20 mg daily, follow for recheck in 6 weeks or sooner if needed  Reviewed relaxation techniques  Dosing and potential medication side effects discussed.  Considered counseling, patient is reluctant and is not interested at this time  Patient verbalised understanding and is agreeable to the plan.    - venlafaxine (EFFEXOR-XR) 150 MG 24 hr capsule; Take 1 capsule (150 mg) by mouth daily  Dispense: 30 capsule; Refill: 0  - citalopram (CELEXA) 20 MG tablet; Take 1 tablet (20 mg) by mouth daily  Dispense: 30 tablet; Refill: 1    4. Anxiety  as above    - citalopram (CELEXA) 20 MG tablet; Take 1 tablet (20 mg) by mouth daily  Dispense: 30 tablet; Refill: 1    5. Need for prophylactic vaccination and inoculation against influenza    - INFLUENZA VACCINE IM > 6 MONTHS VALENT IIV4 [38558]  - Vaccine Administration, Initial [37707]    6. Intermittent asthma, uncomplicated  Stable, continue to use albuterol inhaler as needed  - albuterol (PROAIR HFA) 108 (90 Base) MCG/ACT inhaler; 2 puffs every 4 hours as needed.  Dispense: 1 Inhaler; Refill: 1    7. Obesity (BMI 30-39.9)  Wt Readings from Last 5 Encounters:   11/20/19 108 kg (238 lb)   10/29/19 107 kg (235 lb 12.8 oz)   10/19/19 105.9 kg (233 lb 6.4 oz)   04/19/19 94 kg (207 lb 3.2 oz)   03/08/19 95.8 kg (211 lb 3.2 oz)     Emphasized on weight loss, portion control, low calorie and low fat diet, healthy eating, regular exercises.         BMI:   Estimated body mass index is 35.66 kg/m  as calculated from the following:    Height as of 10/29/19: 1.74 m (5' 8.5\").    Weight as of this encounter: 108 kg (238 lb).   Weight management plan: Discussed healthy diet and exercise guidelines        Work on weight loss  Regular exercise  Chart documentation done in part with Dragon Voice recognition Software. Although reviewed after completion, some word and grammatical error may remain.    See Patient " Instructions    Return in about 6 weeks (around 1/1/2020).    Nelida Celestin MD  [facility]

## 2019-11-20 NOTE — LETTER
My Asthma Action Plan  Name: Treasure Pradhan   YOB: 1975  Date: 11/20/2019   My doctor: Nelida Celestin   My clinic: [facility]      My Control Medicine: None        Dose:   My Rescue Medicine: Albuterol        Dose:    My Asthma Severity: Intermittent / Exercise Induced  Avoid your asthma triggers: upper respiratory infections        GREEN ZONE   Good Control    I feel good    No cough or wheeze    Can work, sleep and play without asthma symptoms       Take your asthma control medicine every day.     1. If exercise triggers your asthma, take your rescue medication    15 minutes before exercise or sports, and    During exercise if you have asthma symptoms  2. Spacer to use with inhaler: If you have a spacer, make sure to use it with your inhaler             YELLOW ZONE Getting Worse  I have ANY of these:    I do not feel good    Cough or wheeze    Chest feels tight    Wake up at night   1. Keep taking your Green Zone medications  2. Start taking your rescue medicine:    every 20 minutes for up to 1 hour. Then every 4 hours for 24-48 hours.  3. If you stay in the Yellow Zone for more than 12-24 hours, contact your doctor.  4. If you do not return to the Green Zone in 12-24 hours or you get worse, start taking your oral steroid medicine if prescribed by your provider.           RED ZONE Medical Alert - Get Help  I have ANY of these:    I feel awful    Medicine is not helping    Breathing getting harder    Trouble walking or talking    Nose opens wide to breathe       1. Take your rescue medicine NOW  2. If your provider has prescribed an oral steroid medicine, start taking it NOW  3. Call your doctor NOW  4. If you are still in the Red Zone after 20 minutes and you have not reached your doctor:    Take your rescue medicine again and    Call 911 or go to the emergency room right away    See your regular doctor within 2 weeks of an Emergency Room or Urgent Care visit for follow-up  treatment.        The above medication may be given at school or day care?: N/A (Adult Patient)  Child can carry and use inhaler(s) at school with approval of school nurse?: N/A (Adult Patient)    Electronically signed by: Nelida Celestin MD, November 20, 2019    Annual Reminders:  Meet with Asthma Educator,  Flu Shot in the Fall, consider Pneumonia Vaccination for patients with asthma (aged 19 and older).    Pharmacy: [id number] [place] [id number] [facility]                    Asthma Triggers  How To Control Things That Make Your Asthma Worse    Triggers are things that make your asthma worse.  Look at the list below to help you find your triggers and what you can do about them.  You can help prevent asthma flare-ups by staying away from your triggers.      Trigger                                                          What you can do   Cigarette Smoke  Tobacco smoke can make asthma worse. Do not allow smoking in your home, car or around you.  Be sure no one smokes at a child s day care or school.  If you smoke, ask your health care provider for ways to help you quit.  Ask family members to quit too.  Ask your health care provider for a referral to Quit Plan to help you quit smoking, or call 6-682-621-PLAN.     Colds, Flu, Bronchitis  These are common triggers of asthma. Wash your hands often.  Don t touch your eyes, nose or mouth.  Get a flu shot every year.     Dust Mites  These are tiny bugs that live in cloth or carpet. They are too small to see. Wash sheets and blankets in hot water every week.   Encase pillows and mattress in dust mite proof covers.  Avoid having carpet if you can. If you have carpet, vacuum weekly.   Use a dust mask and HEPA vacuum.   Pollen and Outdoor Mold  Some people are allergic to trees, grass, or weed pollen, or molds. Try to keep your windows closed.  Limit time out doors when pollen count is high.   Ask you health care provider about taking medicine  during allergy season.     Animal Dander  Some people are allergic to skin flakes, urine or saliva from pets with fur or feathers. Keep pets with fur or feathers out of your home.    If you can t keep the pet outdoors, then keep the pet out of your bedroom.  Keep the bedroom door closed.  Keep pets off cloth furniture and away from stuffed toys.     Mice, Rats, and Cockroaches  Some people are allergic to the waste from these pests.   Cover food and garbage.  Clean up spills and food crumbs.  Store grease in the refrigerator.   Keep food out of the bedroom.   Indoor Mold  This can be a trigger if your home has high moisture. Fix leaking faucets, pipes, or other sources of water.   Clean moldy surfaces.  Dehumidify basement if it is damp and smelly.   Smoke, Strong Odors, and Sprays  These can reduce air quality. Stay away from strong odors and sprays, such as perfume, powder, hair spray, paints, smoke incense, paint, cleaning products, candles and new carpet.   Exercise or Sports  Some people with asthma have this trigger. Be active!  Ask your doctor about taking medicine before sports or exercise to prevent symptoms.    Warm up for 5-10 minutes before and after sports or exercise.     Other Triggers of Asthma  Cold air:  Cover your nose and mouth with a scarf.  Sometimes laughing or crying can be a trigger.  Some medicines and food can trigger asthma.

## 2019-11-20 NOTE — PATIENT INSTRUCTIONS
Start on celexa 20mg daily  Take effexor every other day fr 2 weeks followed by twice weekly for 2 weeks and then stop    Get the flu shot today  Get the labs today  Schedule for recheck in 6 weeks

## 2019-11-21 ASSESSMENT — ASTHMA QUESTIONNAIRES: ACT_TOTALSCORE: 24

## 2019-11-21 ASSESSMENT — ANXIETY QUESTIONNAIRES: GAD7 TOTAL SCORE: 14

## 2019-11-21 NOTE — RESULT ENCOUNTER NOTE
Dear Treasure,  Your lab test showed normal results for thyroid functions, urine exam with no protein leak, normal potassium and kidney functions.  These are good and reassuring.  Let me know if you have any questions. Take care.  Nelida Celestin MD

## 2019-11-27 PROBLEM — M54.50 RIGHT-SIDED LOW BACK PAIN WITHOUT SCIATICA: Status: RESOLVED | Noted: 2019-03-14 | Resolved: 2019-11-27

## 2019-11-27 NOTE — PROGRESS NOTES
Patient did not return for further treatment and no additional progress was noted.  Please refer to the progress note and goal flowsheet completed on 07/06/19 for discharge information.

## 2020-01-08 ENCOUNTER — OFFICE VISIT (OUTPATIENT)
Dept: PEDIATRICS | Facility: CLINIC | Age: 45
End: 2020-01-08
Payer: COMMERCIAL

## 2020-01-08 VITALS
SYSTOLIC BLOOD PRESSURE: 143 MMHG | TEMPERATURE: 98.2 F | DIASTOLIC BLOOD PRESSURE: 91 MMHG | BODY MASS INDEX: 36.04 KG/M2 | HEIGHT: 69 IN | OXYGEN SATURATION: 96 % | HEART RATE: 73 BPM | WEIGHT: 243.3 LBS

## 2020-01-08 DIAGNOSIS — F33.1 MODERATE RECURRENT MAJOR DEPRESSION (H): ICD-10-CM

## 2020-01-08 DIAGNOSIS — F41.9 ANXIETY: ICD-10-CM

## 2020-01-08 DIAGNOSIS — I10 BENIGN ESSENTIAL HYPERTENSION: Primary | ICD-10-CM

## 2020-01-08 DIAGNOSIS — Z30.49 ENCOUNTER FOR SURVEILLANCE OF OTHER CONTRACEPTIVE: ICD-10-CM

## 2020-01-08 PROCEDURE — 99214 OFFICE O/P EST MOD 30 MIN: CPT | Performed by: FAMILY MEDICINE

## 2020-01-08 RX ORDER — LISINOPRIL 20 MG/1
20 TABLET ORAL DAILY
Qty: 30 TABLET | Refills: 1 | Status: SHIPPED | OUTPATIENT
Start: 2020-01-08 | End: 2020-02-05

## 2020-01-08 RX ORDER — BUPROPION HYDROCHLORIDE 150 MG/1
150 TABLET ORAL EVERY MORNING
Qty: 30 TABLET | Refills: 1 | Status: SHIPPED | OUTPATIENT
Start: 2020-01-08 | End: 2020-02-05

## 2020-01-08 RX ORDER — CITALOPRAM HYDROBROMIDE 20 MG/1
20 TABLET ORAL EVERY EVENING
Qty: 90 TABLET | Refills: 1 | Status: SHIPPED | OUTPATIENT
Start: 2020-01-08 | End: 2020-02-05

## 2020-01-08 RX ORDER — ETONOGESTREL AND ETHINYL ESTRADIOL VAGINAL RING .015; .12 MG/D; MG/D
RING VAGINAL
Qty: 3 EACH | Refills: 4 | Status: SHIPPED | OUTPATIENT
Start: 2020-01-08 | End: 2020-09-22

## 2020-01-08 ASSESSMENT — PATIENT HEALTH QUESTIONNAIRE - PHQ9
SUM OF ALL RESPONSES TO PHQ QUESTIONS 1-9: 11
5. POOR APPETITE OR OVEREATING: MORE THAN HALF THE DAYS

## 2020-01-08 ASSESSMENT — ANXIETY QUESTIONNAIRES
5. BEING SO RESTLESS THAT IT IS HARD TO SIT STILL: MORE THAN HALF THE DAYS
6. BECOMING EASILY ANNOYED OR IRRITABLE: SEVERAL DAYS
GAD7 TOTAL SCORE: 8
7. FEELING AFRAID AS IF SOMETHING AWFUL MIGHT HAPPEN: NOT AT ALL
2. NOT BEING ABLE TO STOP OR CONTROL WORRYING: SEVERAL DAYS
1. FEELING NERVOUS, ANXIOUS, OR ON EDGE: SEVERAL DAYS
3. WORRYING TOO MUCH ABOUT DIFFERENT THINGS: SEVERAL DAYS

## 2020-01-08 ASSESSMENT — MIFFLIN-ST. JEOR: SCORE: 1815.04

## 2020-01-08 ASSESSMENT — PAIN SCALES - GENERAL: PAINLEVEL: NO PAIN (0)

## 2020-01-08 NOTE — PROGRESS NOTES
Subjective     Treasure Pradhan is a 44 year old female who presents to clinic today for the following health issues:    HPI    Hypertension Follow-up  Patient is here for blood pressure recheck after starting on hydrochlorothiazide 2 months ago    Do you check your blood pressure regularly outside of the clinic? Yes     Are you following a low salt diet? No    Are your blood pressures ever more than 140 on the top number (systolic) OR more   than 90 on the bottom number (diastolic), for example 140/90? Yes Patient would like to discuss why blood pressure are still high with medication.    Depression and Anxiety Follow-Up  Patient is here for recheck on her depression and anxiety after starting on Celexa 20 mg    How are you doing with your depression since your last visit? Improved     How are you doing with your anxiety since your last visit?  Improved     Are you having other symptoms that might be associated with depression or anxiety? No    Have you had a significant life event? No     Do you have any concerns with your use of alcohol or other drugs? No    Social History     Tobacco Use     Smoking status: Never Smoker     Smokeless tobacco: Never Used   Substance Use Topics     Alcohol use: Yes     Comment: 3-4 drinks per month     Drug use: No     PHQ 4/19/2019 10/29/2019 11/20/2019   PHQ-9 Total Score 13 13 14   Q9: Thoughts of better off dead/self-harm past 2 weeks Not at all Not at all Not at all     NATANAEL-7 SCORE 4/19/2019 10/29/2019 11/20/2019   Total Score - - -   Total Score 13 11 14     Last PHQ-9 11/20/2019   1.  Little interest or pleasure in doing things 2   2.  Feeling down, depressed, or hopeless 0   3.  Trouble falling or staying asleep, or sleeping too much 3   4.  Feeling tired or having little energy 3   5.  Poor appetite or overeating 1   6.  Feeling bad about yourself 0   7.  Trouble concentrating 2   8.  Moving slowly or restless 3   Q9: Thoughts of better off dead/self-harm past 2 weeks 0    PHQ-9 Total Score 14   Difficulty at work, home, or with people -     NATANAEL-7  11/20/2019   1. Feeling nervous, anxious, or on edge 3   2. Not being able to stop or control worrying 1   3. Worrying too much about different things 1   4. Trouble relaxing 3   5. Being so restless that it is hard to sit still 3   6. Becoming easily annoyed or irritable 2   7. Feeling afraid, as if something awful might happen 1   NATANAEL-7 Total Score 14   If you checked any problems, how difficult have they made it for you to do your work, take care of things at home, or get along with other people? -     In the past two weeks have you had thoughts of suicide or self-harm?  No.    Do you have concerns about your personal safety or the safety of others?   No    Suicide Assessment Five-step Evaluation and Treatment (SAFE-T)      How many servings of fruits and vegetables do you eat daily?  4 or more    On average, how many sweetened beverages do you drink each day (Examples: soda, juice, sweet tea, etc.  Do NOT count diet or artificially sweetened beverages)?   0    How many days per week do you miss taking your medication? 0        Patient Active Problem List   Diagnosis     Migraine     Anxiety     Mild intermittent asthma without complication     Anal pain     Back pain     Moderate recurrent major depression (H)     Generalized anxiety disorder     Dyslipidemia     Onychomycosis     Therapeutic drug monitoring     Degeneration of thoracic or thoracolumbar intervertebral disc     DDD (degenerative disc disease), cervical     Migraine without aura and without status migrainosus, not intractable     Encounter for surveillance of other contraceptive     Pigmented skin lesion, midback, 3mm     Chronic allergic rhinitis     Skin lesion, left thigh     Chronic right hip pain     Essential hypertension     Obesity (BMI 35.0-39.9) with comorbidity (H)     Past Surgical History:   Procedure Laterality Date     SURGICAL HISTORY OF -   12/26/2007          SURGICAL HISTORY OF -   3/2006    Rt Foot mass - Perineurioma       Social History     Tobacco Use     Smoking status: Never Smoker     Smokeless tobacco: Never Used   Substance Use Topics     Alcohol use: Yes     Comment: 3-4 drinks per month     Family History   Problem Relation Age of Onset     Heart Disease Father      Prostate Cancer Father      Diabetes Maternal Grandfather      Heart Disease Paternal Grandfather      Genetic Disorder Daughter         At Birth/ [place]         Current Outpatient Medications   Medication Sig Dispense Refill     albuterol (PROAIR HFA) 108 (90 Base) MCG/ACT inhaler 2 puffs every 4 hours as needed. 1 Inhaler 1     aspirin-acetaminophen-caffeine (EXCEDRIN MIGRAINE) 250-250-65 MG tablet Take 1-2 tablets by mouth every 6 hours as needed for headaches       buPROPion (WELLBUTRIN XL) 150 MG 24 hr tablet Take 1 tablet (150 mg) by mouth every morning 30 tablet 1     citalopram (CELEXA) 20 MG tablet Take 1 tablet (20 mg) by mouth every evening 90 tablet 1     cyclobenzaprine (FLEXERIL) 10 MG tablet Take 1 tablet (10 mg) by mouth 2 times daily as needed for muscle spasms 30 tablet 1     diclofenac (VOLTAREN) 1 % topical gel Place onto the skin 4 times daily (Patient taking differently: Place onto the skin 4 times daily as needed ) 100 g 1     docusate sodium (COLACE) 50 MG capsule Take 50 mg by mouth 2 times daily as needed        etonogestrel-ethinyl estradiol (NUVARING) 0.12-0.015 MG/24HR vaginal ring PLACE 1 RING VAGINALLY EVERY 21 DAYS THEN REMOVE FOR 1 WEEK AND REPEAT 3 each 4     fexofenadine (ALLEGRA) 180 MG tablet Take 180 mg by mouth daily       ibuprofen (ADVIL/MOTRIN) 200 MG tablet Take 200-400 mg by mouth every 4 hours as needed for mild pain       lisinopril (PRINIVIL/ZESTRIL) 20 MG tablet Take 1 tablet (20 mg) by mouth daily 30 tablet 1     MAGNESIUM PO Take 1 tablet by mouth daily       melatonin 5 MG tablet Take 5 mg by mouth At Bedtime       mometasone  (NASONEX) 50 MCG/ACT nasal spray Spray 2 sprays into both nostrils daily (Patient taking differently: Spray 2 sprays into both nostrils daily as needed ) 1 Box 11     Multiple Vitamins-Minerals (MULTIVITAMIN ADULT PO) Take 1 tablet by mouth daily       nystatin (MYCOSTATIN) 296821 UNIT/GM POWD Use to squirt inside the socks once daily (Patient taking differently: Use to squirt inside the socks once daily PRN) 60 g 3     rizatriptan (MAXALT-MLT) 10 MG ODT Take 1 tablet (10 mg) by mouth See Admin Instructions WITH ONSET OF MIGRAINE, MAY REPEAT ONCE AFTER 2 HOURS. DO NOT EXCEED 3 TABLETS IN 24 HOURS. 6 tablet 6     Allergies   Allergen Reactions     Terbinafine Itching     Recent Labs   Lab Test 11/20/19  1733 03/07/19  0944 03/03/14  1004 04/24/12  1101   LDL  --  126* 146* 145*   HDL  --  62 46* 43*   TRIG  --  102 96 63   ALT  --   --  33  --    CR 0.69  --   --  0.69   GFRESTIMATED >90  --   --  >90   GFRESTBLACK >90  --   --  >90   POTASSIUM 3.8  --   --  4.1   TSH 0.94  --   --  0.64      BP Readings from Last 3 Encounters:   01/08/20 (!) 143/91   11/20/19 137/89   10/29/19 (!) 136/98    Wt Readings from Last 3 Encounters:   01/08/20 110.4 kg (243 lb 4.8 oz)   11/20/19 108 kg (238 lb)   10/29/19 107 kg (235 lb 12.8 oz)                      Reviewed and updated as needed this visit by Provider         Review of Systems   ROS COMP: CONSTITUTIONAL: NEGATIVE for fever, chills, change in weight  RESP: NEGATIVE for significant cough or SOB  CV: NEGATIVE for chest pain, palpitations or peripheral edema  CV: Hx HTN  GI: NEGATIVE for nausea, abdominal pain, heartburn, or change in bowel habits  MUSCULOSKELETAL: NEGATIVE for significant arthralgias or myalgia  NEURO: NEGATIVE for weakness, dizziness or paresthesias  ENDOCRINE: NEGATIVE for temperature intolerance, skin/hair changes  HEME/ALLERGY/IMMUNE: NEGATIVE for bleeding problems  PSYCHIATRIC: as above      Objective    BP (!) 143/91 (BP Location: Right arm, Patient  "Position: Sitting, Cuff Size: Adult Large)   Pulse 73   Temp 98.2  F (36.8  C) (Oral)   Ht 1.74 m (5' 8.5\")   Wt 110.4 kg (243 lb 4.8 oz)   SpO2 96%   BMI 36.46 kg/m    Body mass index is 36.46 kg/m .  Physical Exam   GENERAL: healthy, alert and no distress  NECK: no adenopathy, no asymmetry, masses, or scars and thyroid normal to palpation  RESP: lungs clear to auscultation - no rales, rhonchi or wheezes  CV: regular rate and rhythm, normal S1 S2, no S3 or S4, no murmur, click or rub, no peripheral edema and peripheral pulses strong  MS: no gross musculoskeletal defects noted, no edema  NEURO: Normal strength and tone, mentation intact and speech normal  PSYCH: mentation appears normal, affect normal/bright    Diagnostic Test Results:  Labs reviewed in Epic        Assessment & Plan     1. Benign essential hypertension  BP Readings from Last 6 Encounters:   01/08/20 (!) 143/91   11/20/19 137/89   10/29/19 (!) 136/98   10/19/19 (!) 153/92   04/19/19 112/81   03/08/19 121/85     Blood pressure is not at goal on current dose of hydrochlorothiazide 25 mg daily  Patient did not see any appreciable difference in blood pressure after starting on medication as evident from her elevated home blood pressure readings  Recommended to stop hydrochlorothiazide, start on lisinopril 20 mg daily  Will follow low salt diet, weight loss and regular exercises.  Discussed about stopping her NuvaRing which could also be causing her hypertension and, considering Mirena for contraception patient has tried Mirena before ,which gave her significant weight gain and had to take it out  She wants to continue with NuvaRing for now and treat the underlying hypertension while she works on the weight loss and regular exercises and low-salt diet  Dosing and potential medication side effects discussed.  Follow-up for recheck in 4 weeks or sooner if needed    - lisinopril (PRINIVIL/ZESTRIL) 20 MG tablet; Take 1 tablet (20 mg) by mouth daily  " Dispense: 30 tablet; Refill: 1  - **Basic metabolic panel FUTURE anytime; Future    2. Moderate recurrent major depression (H)  Needing improvement  Recommended to take Celexa 20 mg daily in the evening, start on Wellbutrin 1 to 2 mg daily in the morning  Follow for recheck in 4 to 5 weeks or sooner if needed  Continue with relaxation techniques, recommended to start on brisk walking for at least 20 to 30 minutes daily  - citalopram (CELEXA) 20 MG tablet; Take 1 tablet (20 mg) by mouth every evening  Dispense: 90 tablet; Refill: 1  - buPROPion (WELLBUTRIN XL) 150 MG 24 hr tablet; Take 1 tablet (150 mg) by mouth every morning  Dispense: 30 tablet; Refill: 1    3. Anxiety  as above    - citalopram (CELEXA) 20 MG tablet; Take 1 tablet (20 mg) by mouth every evening  Dispense: 90 tablet; Refill: 1  - buPROPion (WELLBUTRIN XL) 150 MG 24 hr tablet; Take 1 tablet (150 mg) by mouth every morning  Dispense: 30 tablet; Refill: 1    4. Encounter for surveillance of other contraceptive  as above in problem #1    - etonogestrel-ethinyl estradiol (NUVARING) 0.12-0.015 MG/24HR vaginal ring; PLACE 1 RING VAGINALLY EVERY 21 DAYS THEN REMOVE FOR 1 WEEK AND REPEAT  Dispense: 3 each; Refill: 4       Work on weight loss  Regular exercise  Chart documentation done in part with Dragon Voice recognition Software. Although reviewed after completion, some word and grammatical error may remain.    See Patient Instructions    Return in about 4 weeks (around 2/5/2020) for Non Fasting Labs, Medication Recheck.    Nelida Celestin MD  [facility]

## 2020-01-08 NOTE — PATIENT INSTRUCTIONS
Start on WELLBUTRIN 150mg daily in the morning  Take CELEXA 20mg in the evening  Start on LISINOPRIL 20mg daily  Stop HCTZ  Schedule for recheck in 4 weeks  Schedule for non fasting labs in 4 weeks

## 2020-01-09 ASSESSMENT — ANXIETY QUESTIONNAIRES: GAD7 TOTAL SCORE: 8

## 2020-01-13 ENCOUNTER — TELEPHONE (OUTPATIENT)
Dept: PEDIATRICS | Facility: CLINIC | Age: 45
End: 2020-01-13

## 2020-01-13 NOTE — TELEPHONE ENCOUNTER
1/13 Provided phone number [phone] to schedule in about 4 weeks (around 2/5/2020) for Non Fasting Labs, Medication Recheck.    Audrey Mendieta   Procedure    Ortho/Sports Med/Ent/Eye   [facility]   [phone]

## 2020-01-20 ENCOUNTER — TELEPHONE (OUTPATIENT)
Dept: PEDIATRICS | Facility: CLINIC | Age: 45
End: 2020-01-20

## 2020-01-20 NOTE — TELEPHONE ENCOUNTER
1/20 Provided phone number [phone] to schedule in about 4 weeks (around 2/5/2020) for Non Fasting Labs, Medication Recheck.    Audrey Mendieta   Procedure    Ortho/Sports Med/Ent/Eye   [facility]   [phone]

## 2020-02-05 ENCOUNTER — OFFICE VISIT (OUTPATIENT)
Dept: PEDIATRICS | Facility: CLINIC | Age: 45
End: 2020-02-05
Payer: COMMERCIAL

## 2020-02-05 VITALS
BODY MASS INDEX: 35.9 KG/M2 | DIASTOLIC BLOOD PRESSURE: 82 MMHG | OXYGEN SATURATION: 96 % | WEIGHT: 239.6 LBS | SYSTOLIC BLOOD PRESSURE: 120 MMHG | HEART RATE: 72 BPM | TEMPERATURE: 98.6 F

## 2020-02-05 DIAGNOSIS — I10 BENIGN ESSENTIAL HYPERTENSION: ICD-10-CM

## 2020-02-05 DIAGNOSIS — F41.9 ANXIETY: ICD-10-CM

## 2020-02-05 DIAGNOSIS — F33.1 MODERATE RECURRENT MAJOR DEPRESSION (H): ICD-10-CM

## 2020-02-05 LAB
ANION GAP SERPL CALCULATED.3IONS-SCNC: 5 MMOL/L (ref 3–14)
BUN SERPL-MCNC: 19 MG/DL (ref 7–30)
CALCIUM SERPL-MCNC: 9.5 MG/DL (ref 8.5–10.1)
CHLORIDE SERPL-SCNC: 109 MMOL/L (ref 94–109)
CO2 SERPL-SCNC: 24 MMOL/L (ref 20–32)
CREAT SERPL-MCNC: 0.79 MG/DL (ref 0.52–1.04)
GFR SERPL CREATININE-BSD FRML MDRD: >90 ML/MIN/{1.73_M2}
GLUCOSE SERPL-MCNC: 100 MG/DL (ref 70–99)
POTASSIUM SERPL-SCNC: 4.1 MMOL/L (ref 3.4–5.3)
SODIUM SERPL-SCNC: 138 MMOL/L (ref 133–144)

## 2020-02-05 PROCEDURE — 96127 BRIEF EMOTIONAL/BEHAV ASSMT: CPT | Performed by: FAMILY MEDICINE

## 2020-02-05 PROCEDURE — 80048 BASIC METABOLIC PNL TOTAL CA: CPT | Performed by: FAMILY MEDICINE

## 2020-02-05 PROCEDURE — 36415 COLL VENOUS BLD VENIPUNCTURE: CPT | Performed by: FAMILY MEDICINE

## 2020-02-05 PROCEDURE — 99214 OFFICE O/P EST MOD 30 MIN: CPT | Performed by: FAMILY MEDICINE

## 2020-02-05 RX ORDER — LISINOPRIL 20 MG/1
20 TABLET ORAL DAILY
Qty: 90 TABLET | Refills: 1 | Status: SHIPPED | OUTPATIENT
Start: 2020-02-05 | End: 2020-09-22

## 2020-02-05 RX ORDER — BUPROPION HYDROCHLORIDE 150 MG/1
150 TABLET ORAL EVERY MORNING
Qty: 90 TABLET | Refills: 1 | Status: SHIPPED | OUTPATIENT
Start: 2020-02-05 | End: 2020-09-03

## 2020-02-05 RX ORDER — CITALOPRAM HYDROBROMIDE 20 MG/1
20 TABLET ORAL EVERY EVENING
Qty: 90 TABLET | Refills: 1 | Status: SHIPPED | OUTPATIENT
Start: 2020-02-05 | End: 2020-09-22

## 2020-02-05 ASSESSMENT — PAIN SCALES - GENERAL: PAINLEVEL: NO PAIN (0)

## 2020-02-05 ASSESSMENT — ANXIETY QUESTIONNAIRES
6. BECOMING EASILY ANNOYED OR IRRITABLE: NOT AT ALL
3. WORRYING TOO MUCH ABOUT DIFFERENT THINGS: SEVERAL DAYS
1. FEELING NERVOUS, ANXIOUS, OR ON EDGE: SEVERAL DAYS
7. FEELING AFRAID AS IF SOMETHING AWFUL MIGHT HAPPEN: SEVERAL DAYS
2. NOT BEING ABLE TO STOP OR CONTROL WORRYING: NOT AT ALL
5. BEING SO RESTLESS THAT IT IS HARD TO SIT STILL: SEVERAL DAYS
GAD7 TOTAL SCORE: 5

## 2020-02-05 ASSESSMENT — PATIENT HEALTH QUESTIONNAIRE - PHQ9
SUM OF ALL RESPONSES TO PHQ QUESTIONS 1-9: 7
5. POOR APPETITE OR OVEREATING: SEVERAL DAYS

## 2020-02-05 NOTE — PROGRESS NOTES
Subjective     Treasure Pradhan is a 44 year old female who presents to clinic today for the following health issues:    HPI   Hypertension Follow-up  Patient is here for hypertension recheck after starting on lisinopril   She continues to take with the NuvaRing for contraception  At her last visit, patient was recommended to stop using estrogen containing hormonal contraception.  She discussed with her  freezing the plan of getting gastrectomy in the near future   Denies chest pain, SOB, edema legs, visual concerns, focal neurological symptoms, dizziness, syncope, palpitations.      Do you check your blood pressure regularly outside of the clinic? Yes     Are you following a low salt diet? Trying to    Are your blood pressures ever more than 140 on the top number (systolic) OR more   than 90 on the bottom number (diastolic), for example 140/90? No    Depression and Anxiety Follow-Up    How are you doing with your depression since your last visit? Improved     How are you doing with your anxiety since your last visit?  Improved     Are you having other symptoms that might be associated with depression or anxiety? No    Have you had a significant life event? No     Do you have any concerns with your use of alcohol or other drugs? No    Social History     Tobacco Use     Smoking status: Never Smoker     Smokeless tobacco: Never Used   Substance Use Topics     Alcohol use: Yes     Comment: 3-4 drinks per month     Drug use: No     PHQ 11/20/2019 1/8/2020 2/5/2020   PHQ-9 Total Score 14 11 7   Q9: Thoughts of better off dead/self-harm past 2 weeks Not at all Not at all Not at all     NATANAEL-7 SCORE 11/20/2019 1/8/2020 2/5/2020   Total Score - - -   Total Score 14 8 5     Last PHQ-9 2/5/2020   1.  Little interest or pleasure in doing things 2   2.  Feeling down, depressed, or hopeless 1   3.  Trouble falling or staying asleep, or sleeping too much 1   4.  Feeling tired or having little energy 2   5.  Poor appetite or  overeating 0   6.  Feeling bad about yourself 0   7.  Trouble concentrating 0   8.  Moving slowly or restless 1   Q9: Thoughts of better off dead/self-harm past 2 weeks 0   PHQ-9 Total Score 7   Difficulty at work, home, or with people -     NATANAEL-7  2/5/2020   1. Feeling nervous, anxious, or on edge 1   2. Not being able to stop or control worrying 0   3. Worrying too much about different things 1   4. Trouble relaxing 1   5. Being so restless that it is hard to sit still 1   6. Becoming easily annoyed or irritable 0   7. Feeling afraid, as if something awful might happen 1   NATANAEL-7 Total Score 5   If you checked any problems, how difficult have they made it for you to do your work, take care of things at home, or get along with other people? -     In the past two weeks have you had thoughts of suicide or self-harm?  No.    Do you have concerns about your personal safety or the safety of others?   No    Suicide Assessment Five-step Evaluation and Treatment (SAFE-T)      How many servings of fruits and vegetables do you eat daily?  4 or more    On average, how many sweetened beverages do you drink each day (Examples: soda, juice, sweet tea, etc.  Do NOT count diet or artificially sweetened beverages)?   2    How many days per week do you exercise enough to make your heart beat faster? 3 or less    How many minutes a day do you exercise enough to make your heart beat faster? 9 or less    How many days per week do you miss taking your medication? 0        Patient Active Problem List   Diagnosis     Migraine     Anxiety     Mild intermittent asthma without complication     Anal pain     Back pain     Moderate recurrent major depression (H)     Generalized anxiety disorder     Dyslipidemia     Onychomycosis     Therapeutic drug monitoring     Degeneration of thoracic or thoracolumbar intervertebral disc     DDD (degenerative disc disease), cervical     Migraine without aura and without status migrainosus, not intractable      Encounter for surveillance of other contraceptive     Pigmented skin lesion, midback, 3mm     Chronic allergic rhinitis     Skin lesion, left thigh     Chronic right hip pain     Benign essential hypertension     Obesity (BMI 35.0-39.9) with comorbidity (H)     Past Surgical History:   Procedure Laterality Date     SURGICAL HISTORY OF -   2007         SURGICAL HISTORY OF -   3/2006    Rt Foot mass - Perineurioma       Social History     Tobacco Use     Smoking status: Never Smoker     Smokeless tobacco: Never Used   Substance Use Topics     Alcohol use: Yes     Comment: 3-4 drinks per month     Family History   Problem Relation Age of Onset     Heart Disease Father      Prostate Cancer Father      Diabetes Maternal Grandfather      Heart Disease Paternal Grandfather      Genetic Disorder Daughter         At Birth/ [place]         Current Outpatient Medications   Medication Sig Dispense Refill     albuterol (PROAIR HFA) 108 (90 Base) MCG/ACT inhaler 2 puffs every 4 hours as needed. 1 Inhaler 1     aspirin-acetaminophen-caffeine (EXCEDRIN MIGRAINE) 250-250-65 MG tablet Take 1-2 tablets by mouth every 6 hours as needed for headaches       buPROPion (WELLBUTRIN XL) 150 MG 24 hr tablet Take 1 tablet (150 mg) by mouth every morning 90 tablet 1     citalopram (CELEXA) 20 MG tablet Take 1 tablet (20 mg) by mouth every evening 90 tablet 1     cyclobenzaprine (FLEXERIL) 10 MG tablet Take 1 tablet (10 mg) by mouth 2 times daily as needed for muscle spasms 30 tablet 1     diclofenac (VOLTAREN) 1 % topical gel Place onto the skin 4 times daily (Patient taking differently: Place onto the skin 4 times daily as needed ) 100 g 1     docusate sodium (COLACE) 50 MG capsule Take 50 mg by mouth 2 times daily as needed        etonogestrel-ethinyl estradiol (NUVARING) 0.12-0.015 MG/24HR vaginal ring PLACE 1 RING VAGINALLY EVERY 21 DAYS THEN REMOVE FOR 1 WEEK AND REPEAT 3 each 4     fexofenadine (ALLEGRA) 180 MG tablet  Take 180 mg by mouth daily       ibuprofen (ADVIL/MOTRIN) 200 MG tablet Take 200-400 mg by mouth every 4 hours as needed for mild pain       lisinopril (PRINIVIL/ZESTRIL) 20 MG tablet Take 1 tablet (20 mg) by mouth daily 90 tablet 1     MAGNESIUM PO Take 1 tablet by mouth daily       melatonin 5 MG tablet Take 5 mg by mouth At Bedtime       mometasone (NASONEX) 50 MCG/ACT nasal spray Spray 2 sprays into both nostrils daily (Patient taking differently: Spray 2 sprays into both nostrils daily as needed ) 1 Box 11     Multiple Vitamins-Minerals (MULTIVITAMIN ADULT PO) Take 1 tablet by mouth daily       nystatin (MYCOSTATIN) 738791 UNIT/GM POWD Use to squirt inside the socks once daily (Patient taking differently: Use to squirt inside the socks once daily PRN) 60 g 3     rizatriptan (MAXALT-MLT) 10 MG ODT Take 1 tablet (10 mg) by mouth See Admin Instructions WITH ONSET OF MIGRAINE, MAY REPEAT ONCE AFTER 2 HOURS. DO NOT EXCEED 3 TABLETS IN 24 HOURS. 6 tablet 6     Allergies   Allergen Reactions     Terbinafine Itching     Recent Labs   Lab Test 02/05/20  1717 11/20/19  1733 03/07/19  0944 03/03/14  1004 04/24/12  1101   LDL  --   --  126* 146* 145*   HDL  --   --  62 46* 43*   TRIG  --   --  102 96 63   ALT  --   --   --  33  --    CR 0.79 0.69  --   --  0.69   GFRESTIMATED >90 >90  --   --  >90   GFRESTBLACK >90 >90  --   --  >90   POTASSIUM 4.1 3.8  --   --  4.1   TSH  --  0.94  --   --  0.64      BP Readings from Last 3 Encounters:   02/05/20 120/82   01/08/20 (!) 143/91   11/20/19 137/89    Wt Readings from Last 3 Encounters:   02/05/20 108.7 kg (239 lb 9.6 oz)   01/08/20 110.4 kg (243 lb 4.8 oz)   11/20/19 108 kg (238 lb)                    Reviewed and updated as needed this visit by Provider         Review of Systems   ROS COMP: CONSTITUTIONAL: NEGATIVE for fever, chills, change in weight  RESP: NEGATIVE for significant cough or SOB  CV: NEGATIVE for chest pain, palpitations or peripheral edema  CV: as above  GI:  NEGATIVE for nausea, abdominal pain, heartburn, or change in bowel habits  MUSCULOSKELETAL: NEGATIVE for significant arthralgias or myalgia  NEURO: NEGATIVE for weakness, dizziness or paresthesias  PSYCHIATRIC: as above      Objective    /82 (BP Location: Right arm, Patient Position: Sitting, Cuff Size: Adult Large)   Pulse 72   Temp 98.6  F (37  C) (Oral)   Wt 108.7 kg (239 lb 9.6 oz)   SpO2 96%   BMI 35.90 kg/m    Body mass index is 35.9 kg/m .  Physical Exam   GENERAL: healthy, alert and no distress  RESP: lungs clear to auscultation - no rales, rhonchi or wheezes  CV: regular rate and rhythm, normal S1 S2, no S3 or S4, no murmur, click or rub, no peripheral edema and peripheral pulses strong  MS: no gross musculoskeletal defects noted, no edema  NEURO: Normal strength and tone, mentation intact and speech normal  PSYCH: mentation appears normal, affect normal/bright    Diagnostic Test Results:  Labs reviewed in Epic        Assessment & Plan     1. Moderate recurrent major depression (H)  Improved and stable  Continue with Celexa and bupropion at current doses, follow for recheck in 6 months or sooner if needed  - buPROPion (WELLBUTRIN XL) 150 MG 24 hr tablet; Take 1 tablet (150 mg) by mouth every morning  Dispense: 90 tablet; Refill: 1  - citalopram (CELEXA) 20 MG tablet; Take 1 tablet (20 mg) by mouth every evening  Dispense: 90 tablet; Refill: 1  - EMOTIONAL / BEHAVIORAL ASSESSMENT    2. Anxiety  as above    - buPROPion (WELLBUTRIN XL) 150 MG 24 hr tablet; Take 1 tablet (150 mg) by mouth every morning  Dispense: 90 tablet; Refill: 1  - citalopram (CELEXA) 20 MG tablet; Take 1 tablet (20 mg) by mouth every evening  Dispense: 90 tablet; Refill: 1  - EMOTIONAL / BEHAVIORAL ASSESSMENT    3. Benign essential hypertension  BP Readings from Last 6 Encounters:   02/05/20 120/82   01/08/20 (!) 143/91   11/20/19 137/89   10/29/19 (!) 136/98   10/19/19 (!) 153/92   04/19/19 112/81     Blood pressure is at  goal, reviewed normal BMP lab results from today, continue lisinopril 20 mg daily  Patient will discuss with her  about possible vasectomy and stop taking NuvaRing, since I am concerned with her hormonal contraception elevating her blood pressure  Will follow low salt diet, weight loss and regular exercises  Patient verbalised understanding and is agreeable to the plan.  .    - lisinopril (PRINIVIL/ZESTRIL) 20 MG tablet; Take 1 tablet (20 mg) by mouth daily  Dispense: 90 tablet; Refill: 1       Chart documentation done in part with Dragon Voice recognition Software. Although reviewed after completion, some word and grammatical error may remain.    See Patient Instructions    Return in about 6 months (around 8/5/2020) for Medication Recheck.    Nelida Celestin MD  [facility]

## 2020-02-06 ASSESSMENT — ANXIETY QUESTIONNAIRES: GAD7 TOTAL SCORE: 5

## 2020-08-30 DIAGNOSIS — F33.1 MODERATE RECURRENT MAJOR DEPRESSION (H): ICD-10-CM

## 2020-08-30 DIAGNOSIS — F41.9 ANXIETY: ICD-10-CM

## 2020-09-03 RX ORDER — BUPROPION HYDROCHLORIDE 150 MG/1
150 TABLET ORAL EVERY MORNING
Qty: 90 TABLET | Refills: 1 | Status: SHIPPED | OUTPATIENT
Start: 2020-09-03 | End: 2020-09-22

## 2020-09-22 ENCOUNTER — OFFICE VISIT (OUTPATIENT)
Dept: PEDIATRICS | Facility: CLINIC | Age: 45
End: 2020-09-22
Payer: COMMERCIAL

## 2020-09-22 VITALS
HEART RATE: 70 BPM | SYSTOLIC BLOOD PRESSURE: 123 MMHG | TEMPERATURE: 97.6 F | DIASTOLIC BLOOD PRESSURE: 83 MMHG | HEIGHT: 69 IN | OXYGEN SATURATION: 99 % | BODY MASS INDEX: 33.38 KG/M2 | WEIGHT: 225.4 LBS

## 2020-09-22 DIAGNOSIS — F41.9 ANXIETY: ICD-10-CM

## 2020-09-22 DIAGNOSIS — M62.830 BACK MUSCLE SPASM: ICD-10-CM

## 2020-09-22 DIAGNOSIS — J45.20 INTERMITTENT ASTHMA, UNCOMPLICATED: ICD-10-CM

## 2020-09-22 DIAGNOSIS — G43.709 CHRONIC MIGRAINE WITHOUT AURA WITHOUT STATUS MIGRAINOSUS, NOT INTRACTABLE: ICD-10-CM

## 2020-09-22 DIAGNOSIS — Z12.39 BREAST CANCER SCREENING: ICD-10-CM

## 2020-09-22 DIAGNOSIS — Z30.49 ENCOUNTER FOR SURVEILLANCE OF OTHER CONTRACEPTIVE: ICD-10-CM

## 2020-09-22 DIAGNOSIS — Z00.00 ROUTINE GENERAL MEDICAL EXAMINATION AT A HEALTH CARE FACILITY: Primary | ICD-10-CM

## 2020-09-22 DIAGNOSIS — Z12.4 CERVICAL CANCER SCREENING: ICD-10-CM

## 2020-09-22 DIAGNOSIS — J30.9 CHRONIC ALLERGIC RHINITIS: ICD-10-CM

## 2020-09-22 DIAGNOSIS — I10 BENIGN ESSENTIAL HYPERTENSION: ICD-10-CM

## 2020-09-22 DIAGNOSIS — F33.1 MODERATE RECURRENT MAJOR DEPRESSION (H): ICD-10-CM

## 2020-09-22 PROCEDURE — 90471 IMMUNIZATION ADMIN: CPT | Performed by: FAMILY MEDICINE

## 2020-09-22 PROCEDURE — 99396 PREV VISIT EST AGE 40-64: CPT | Mod: 25 | Performed by: FAMILY MEDICINE

## 2020-09-22 PROCEDURE — 90686 IIV4 VACC NO PRSV 0.5 ML IM: CPT | Performed by: FAMILY MEDICINE

## 2020-09-22 RX ORDER — ETONOGESTREL AND ETHINYL ESTRADIOL VAGINAL RING .015; .12 MG/D; MG/D
RING VAGINAL
Qty: 3 EACH | Refills: 4 | Status: SHIPPED | OUTPATIENT
Start: 2020-09-22 | End: 2021-09-29

## 2020-09-22 RX ORDER — LISINOPRIL 20 MG/1
10 TABLET ORAL DAILY
Qty: 45 TABLET | Refills: 3 | Status: SHIPPED | OUTPATIENT
Start: 2020-09-22 | End: 2021-03-22

## 2020-09-22 RX ORDER — MOMETASONE FUROATE MONOHYDRATE 50 UG/1
2 SPRAY, METERED NASAL DAILY PRN
Qty: 17 G | Refills: 6 | Status: SHIPPED | OUTPATIENT
Start: 2020-09-22 | End: 2021-03-22

## 2020-09-22 RX ORDER — CYCLOBENZAPRINE HCL 10 MG
10 TABLET ORAL 2 TIMES DAILY PRN
Qty: 30 TABLET | Refills: 1 | Status: SHIPPED | OUTPATIENT
Start: 2020-09-22 | End: 2021-03-22

## 2020-09-22 RX ORDER — RIZATRIPTAN BENZOATE 10 MG/1
10 TABLET, ORALLY DISINTEGRATING ORAL SEE ADMIN INSTRUCTIONS
Qty: 6 TABLET | Refills: 6 | Status: SHIPPED | OUTPATIENT
Start: 2020-09-22 | End: 2021-03-22

## 2020-09-22 RX ORDER — BUPROPION HYDROCHLORIDE 150 MG/1
150 TABLET ORAL EVERY MORNING
Qty: 90 TABLET | Refills: 1 | Status: SHIPPED | OUTPATIENT
Start: 2020-09-22 | End: 2021-03-22 | Stop reason: DRUGHIGH

## 2020-09-22 RX ORDER — CITALOPRAM HYDROBROMIDE 20 MG/1
20 TABLET ORAL EVERY EVENING
Qty: 90 TABLET | Refills: 1 | Status: SHIPPED | OUTPATIENT
Start: 2020-09-22 | End: 2021-03-22

## 2020-09-22 RX ORDER — ALBUTEROL SULFATE 90 UG/1
AEROSOL, METERED RESPIRATORY (INHALATION)
Qty: 1 INHALER | Refills: 1 | Status: SHIPPED | OUTPATIENT
Start: 2020-09-22 | End: 2021-09-29

## 2020-09-22 ASSESSMENT — ANXIETY QUESTIONNAIRES
7. FEELING AFRAID AS IF SOMETHING AWFUL MIGHT HAPPEN: SEVERAL DAYS
2. NOT BEING ABLE TO STOP OR CONTROL WORRYING: SEVERAL DAYS
1. FEELING NERVOUS, ANXIOUS, OR ON EDGE: MORE THAN HALF THE DAYS
GAD7 TOTAL SCORE: 9
6. BECOMING EASILY ANNOYED OR IRRITABLE: SEVERAL DAYS
3. WORRYING TOO MUCH ABOUT DIFFERENT THINGS: SEVERAL DAYS
5. BEING SO RESTLESS THAT IT IS HARD TO SIT STILL: MORE THAN HALF THE DAYS
IF YOU CHECKED OFF ANY PROBLEMS ON THIS QUESTIONNAIRE, HOW DIFFICULT HAVE THESE PROBLEMS MADE IT FOR YOU TO DO YOUR WORK, TAKE CARE OF THINGS AT HOME, OR GET ALONG WITH OTHER PEOPLE: SOMEWHAT DIFFICULT

## 2020-09-22 ASSESSMENT — PATIENT HEALTH QUESTIONNAIRE - PHQ9
SUM OF ALL RESPONSES TO PHQ QUESTIONS 1-9: 12
5. POOR APPETITE OR OVEREATING: SEVERAL DAYS

## 2020-09-22 ASSESSMENT — MIFFLIN-ST. JEOR: SCORE: 1733.85

## 2020-09-22 NOTE — PROGRESS NOTES
SUBJECTIVE:   CC: Treasure Pradhan is an 44 year old woman who presents for preventive health visit.       Patient has been advised of split billing requirements and indicates understanding: Yes  Healthy Habits:    Do you get at least three servings of calcium containing foods daily (dairy, green leafy vegetables, etc.)? yes    Amount of exercise or daily activities, outside of work: 5 day(s) per week    Problems taking medications regularly No    Medication side effects: No    Have you had an eye exam in the past two years? no    Do you see a dentist twice per year? yes    Do you have sleep apnea, excessive snoring or daytime drowsiness?no          Today's PHQ-2 Score:   PHQ-2 ( 1999 [employer]) 9/22/2020 9/22/2020   Q1: Little interest or pleasure in doing things 1 2   Q2: Feeling down, depressed or hopeless 1 2   PHQ-2 Score 2 4       Abuse: Current or Past(Physical, Sexual or Emotional)- No  Do you feel safe in your environment? Yes        Social History     Tobacco Use     Smoking status: Never Smoker     Smokeless tobacco: Never Used   Substance Use Topics     Alcohol use: Yes     Comment: 3-4 drinks per month     If you drink alcohol do you typically have >3 drinks per day or >7 drinks per week? No                     Reviewed orders with patient.  Reviewed health maintenance and updated orders accordingly - Yes  Lab work is in process  Labs reviewed in EPIC  BP Readings from Last 3 Encounters:   09/22/20 123/83   02/05/20 120/82   01/08/20 (!) 143/91    Wt Readings from Last 3 Encounters:   09/22/20 102.2 kg (225 lb 6.4 oz)   02/05/20 108.7 kg (239 lb 9.6 oz)   01/08/20 110.4 kg (243 lb 4.8 oz)                  Patient Active Problem List   Diagnosis     Migraine     Anxiety     Mild intermittent asthma without complication     Anal pain     Back pain     Moderate recurrent major depression (H)     Generalized anxiety disorder     Dyslipidemia     Onychomycosis     Therapeutic drug monitoring     Degeneration  of thoracic or thoracolumbar intervertebral disc     DDD (degenerative disc disease), cervical     Migraine without aura and without status migrainosus, not intractable     Encounter for surveillance of other contraceptive     Pigmented skin lesion, midback, 3mm     Chronic allergic rhinitis     Skin lesion, left thigh     Chronic right hip pain     Benign essential hypertension     Obesity (BMI 35.0-39.9) with comorbidity (H)     Back muscle spasm     Past Surgical History:   Procedure Laterality Date     SURGICAL HISTORY OF -   2007         SURGICAL HISTORY OF -   3/2006    Rt Foot mass - Perineurioma       Social History     Tobacco Use     Smoking status: Never Smoker     Smokeless tobacco: Never Used   Substance Use Topics     Alcohol use: Yes     Comment: 3-4 drinks per month     Family History   Problem Relation Age of Onset     Heart Disease Father      Prostate Cancer Father      Diabetes Maternal Grandfather      Heart Disease Paternal Grandfather      Genetic Disorder Daughter         At Birth/ [place]         Current Outpatient Medications   Medication Sig Dispense Refill     albuterol (PROAIR HFA) 108 (90 Base) MCG/ACT inhaler 2 puffs every 4 hours as needed. 1 Inhaler 1     aspirin-acetaminophen-caffeine (EXCEDRIN MIGRAINE) 250-250-65 MG tablet Take 1-2 tablets by mouth every 6 hours as needed for headaches       buPROPion (WELLBUTRIN XL) 150 MG 24 hr tablet Take 1 tablet (150 mg) by mouth every morning 90 tablet 1     citalopram (CELEXA) 20 MG tablet Take 1 tablet (20 mg) by mouth every evening 90 tablet 1     cyclobenzaprine (FLEXERIL) 10 MG tablet Take 1 tablet (10 mg) by mouth 2 times daily as needed for muscle spasms 30 tablet 1     diclofenac (VOLTAREN) 1 % topical gel Place onto the skin 4 times daily (Patient taking differently: Place onto the skin 4 times daily as needed ) 100 g 1     docusate sodium (COLACE) 50 MG capsule Take 50 mg by mouth 2 times daily as needed         etonogestrel-ethinyl estradiol (NUVARING) 0.12-0.015 MG/24HR vaginal ring PLACE 1 RING VAGINALLY EVERY 21 DAYS THEN REMOVE FOR 1 WEEK AND REPEAT 3 each 4     fexofenadine (ALLEGRA) 180 MG tablet Take 180 mg by mouth daily       ibuprofen (ADVIL/MOTRIN) 200 MG tablet Take 200-400 mg by mouth every 4 hours as needed for mild pain       lisinopril (ZESTRIL) 20 MG tablet Take 0.5 tablets (10 mg) by mouth daily 45 tablet 3     MAGNESIUM PO Take 1 tablet by mouth daily       melatonin 5 MG tablet Take 5 mg by mouth At Bedtime       mometasone (NASONEX) 50 MCG/ACT nasal spray Spray 2 sprays into both nostrils daily as needed 17 g 6     Multiple Vitamins-Minerals (MULTIVITAMIN ADULT PO) Take 1 tablet by mouth daily       nystatin (MYCOSTATIN) 086268 UNIT/GM POWD Use to squirt inside the socks once daily (Patient taking differently: Use to squirt inside the socks once daily PRN) 60 g 3     rizatriptan (MAXALT-MLT) 10 MG ODT Take 1 tablet (10 mg) by mouth See Admin Instructions WITH ONSET OF MIGRAINE, MAY REPEAT ONCE AFTER 2 HOURS. DO NOT EXCEED 3 TABLETS IN 24 HOURS. 6 tablet 6     Allergies   Allergen Reactions     Terbinafine Itching     Recent Labs   Lab Test 02/05/20  1717 11/20/19  1733 03/07/19  0944 03/03/14  1004   LDL  --   --  126* 146*   HDL  --   --  62 46*   TRIG  --   --  102 96   ALT  --   --   --  33   CR 0.79 0.69  --   --    GFRESTIMATED >90 >90  --   --    GFRESTBLACK >90 >90  --   --    POTASSIUM 4.1 3.8  --   --    TSH  --  0.94  --   --         Mammogram Screening: Patient under age 50, mutual decision reflected in health maintenance.      Pertinent mammograms are reviewed under the imaging tab.  History of abnormal Pap smear: NO - age 30-65 PAP every 5 years with negative HPV co-testing recommended  PAP / HPV Latest Ref Rng & Units 2/23/2018 6/3/2015 4/24/2012   PAP - NIL NIL NIL   HPV 16 DNA NEG:Negative Negative - -   HPV 18 DNA NEG:Negative Negative - -   OTHER HR HPV NEG:Negative Negative - -      Reviewed and updated as needed this visit by clinical staff  Tobacco  Allergies  Meds  Med Hx  Surg Hx  Fam Hx  Soc Hx        Reviewed and updated as needed this visit by Provider          Past Medical History:   Diagnosis Date     Allergic rhinitis      Asthma, intermittent      Back pain      Depression with anxiety      Essential hypertension 10/29/2019     Hyperlipidemia LDL goal < 160      Migraine       Past Surgical History:   Procedure Laterality Date     SURGICAL HISTORY OF -   2007         SURGICAL HISTORY OF -   3/2006    Rt Foot mass - Perineurioma     OB History    Para Term  AB Living   2 2 2 0 0 2   SAB TAB Ectopic Multiple Live Births   0 0 0 0 2      # Outcome Date GA Lbr Presley/2nd Weight Sex Delivery Anes PTL Lv   2 Term      -SEC   INDU   1 Term      -SEC   INDU       ROS:  CONSTITUTIONAL: NEGATIVE for fever, chills, change in weight  INTEGUMENTARU/SKIN: NEGATIVE for worrisome rashes, moles or lesions  EYES: NEGATIVE for vision changes or irritation  ENT: NEGATIVE for ear, mouth and throat problems  ENT: History of seasonal allergies  RESP: NEGATIVE for significant cough or SOB  RESP: History of asthma  BREAST: NEGATIVE for masses, tenderness or discharge  CV: NEGATIVE for chest pain, palpitations or peripheral edema  CV: History of hypertension  GI: NEGATIVE for nausea, abdominal pain, heartburn, or change in bowel habits  : NEGATIVE for unusual urinary or vaginal symptoms. Periods are regular.  MUSCULOSKELETAL: NEGATIVE for significant arthralgias or myalgia  NEURO: NEGATIVE for weakness, dizziness or paresthesias  NEURO: History of migraine headaches  ENDOCRINE: NEGATIVE for temperature intolerance, skin/hair changes  HEME/ALLERGY/IMMUNE: NEGATIVE for bleeding problems  PSYCHIATRIC: NEGATIVE for changes in mood or affect  PSYCHIATRIC: History of anxiety and depression    OBJECTIVE:   /83   Pulse 70   Temp 97.6  F (36.4  C) (Temporal)   " Ht 1.74 m (5' 8.5\")   Wt 102.2 kg (225 lb 6.4 oz)   LMP 07/15/2020 (Approximate)   SpO2 99%   BMI 33.77 kg/m    EXAM:  GENERAL: healthy, alert and no distress  EYES: Eyes grossly normal to inspection, PERRL and conjunctivae and sclerae normal  HENT: ear canals and TM's normal, nose and mouth without ulcers or lesions  NECK: no adenopathy, no asymmetry, masses, or scars and thyroid normal to palpation  RESP: lungs clear to auscultation - no rales, rhonchi or wheezes  BREAST: normal without masses, tenderness or nipple discharge and no palpable axillary masses or adenopathy  CV: regular rate and rhythm, normal S1 S2, no S3 or S4, no murmur, click or rub, no peripheral edema and peripheral pulses strong  ABDOMEN: soft, nontender, no hepatosplenomegaly, no masses and bowel sounds normal   (female): normal female external genitalia, normal urethral meatus, vaginal mucosa pink, moist, well rugated, and normal cervix/adnexa/uterus without masses or discharge  MS: no gross musculoskeletal defects noted, no edema  SKIN: no suspicious lesions or rashes  NEURO: Normal strength and tone, mentation intact and speech normal  PSYCH: mentation appears normal, affect normal/bright    Diagnostic Test Results:  Labs reviewed in Epic  No results found for this or any previous visit (from the past 24 hour(s)).    ASSESSMENT/PLAN:   1. Routine general medical examination at a health care facility  Discussed on regular exercises, daily calcium intake, healthy eating, self breast exams monthly and routine dental checks    - MA Screening Digital Bilateral; Future    2. Benign essential hypertension  BP Readings from Last 6 Encounters:   09/22/20 123/83   02/05/20 120/82   01/08/20 (!) 143/91   11/20/19 137/89   10/29/19 (!) 136/98   10/19/19 (!) 153/92     Patient was taking lisinopril 20 mg daily, with a recent weight loss, her blood pressure at home was noted to be  systolic and 60-70 diastolic and hence  she cut it down to " 10 mg daily  We will continue with current dose  Will follow low salt diet, weight loss and regular exercises.  Follow-up for recheck in 6 months or sooner if needed  - lisinopril (ZESTRIL) 20 MG tablet; Take 0.5 tablets (10 mg) by mouth daily  Dispense: 45 tablet; Refill: 3    3. Chronic migraine without aura without status migrainosus, not intractable  Stable, continue with current dose of Maxalt, avoid potential triggers for migraine, recheck in 6 months  - rizatriptan (MAXALT-MLT) 10 MG ODT; Take 1 tablet (10 mg) by mouth See Admin Instructions WITH ONSET OF MIGRAINE, MAY REPEAT ONCE AFTER 2 HOURS. DO NOT EXCEED 3 TABLETS IN 24 HOURS.  Dispense: 6 tablet; Refill: 6    4. Encounter for surveillance of other contraceptive    - etonogestrel-ethinyl estradiol (NUVARING) 0.12-0.015 MG/24HR vaginal ring; PLACE 1 RING VAGINALLY EVERY 21 DAYS THEN REMOVE FOR 1 WEEK AND REPEAT  Dispense: 3 each; Refill: 4    5. Back muscle spasm  Refills given per patient's request  - cyclobenzaprine (FLEXERIL) 10 MG tablet; Take 1 tablet (10 mg) by mouth 2 times daily as needed for muscle spasms  Dispense: 30 tablet; Refill: 1    6. Moderate recurrent major depression (H)  Stable, continue current dose of Celexa and Wellbutrin, recheck in 6 months or sooner if needed  - buPROPion (WELLBUTRIN XL) 150 MG 24 hr tablet; Take 1 tablet (150 mg) by mouth every morning  Dispense: 90 tablet; Refill: 1  - citalopram (CELEXA) 20 MG tablet; Take 1 tablet (20 mg) by mouth every evening  Dispense: 90 tablet; Refill: 1    7. Anxiety  as above    - buPROPion (WELLBUTRIN XL) 150 MG 24 hr tablet; Take 1 tablet (150 mg) by mouth every morning  Dispense: 90 tablet; Refill: 1  - citalopram (CELEXA) 20 MG tablet; Take 1 tablet (20 mg) by mouth every evening  Dispense: 90 tablet; Refill: 1    8. Intermittent asthma, uncomplicated  Stable, continue with albuterol inhaler as needed, continue to control underlying allergies with Allegra  - albuterol (PROAIR HFA)  "108 (90 Base) MCG/ACT inhaler; 2 puffs every 4 hours as needed.  Dispense: 1 Inhaler; Refill: 1    9. Chronic allergic rhinitis  Continue with Nasonex nasal spray and oral antihistamines  - mometasone (NASONEX) 50 MCG/ACT nasal spray; Spray 2 sprays into both nostrils daily as needed  Dispense: 17 g; Refill: 6    10. Breast cancer screening    - MA Screening Digital Bilateral; Future    11. Cervical cancer screening  Patient is not due for Pap until next year      Patient has been advised of split billing requirements and indicates understanding: Yes  COUNSELING:   Reviewed preventive health counseling, as reflected in patient instructions  Special attention given to:        Regular exercise       Healthy diet/nutrition       Vision screening       Immunizations    Vaccinated for: Influenza             Contraception       Family planning       Folic Acid Counseling       Osteoporosis Prevention/Bone Health       The 10-year ASCVD risk score (Romain OLIVERA Jr., et al., 2013) is: 0.8%    Values used to calculate the score:      Age: 44 years      Sex: Female      Is Non- : No      Diabetic: No      Tobacco smoker: No      Systolic Blood Pressure: 123 mmHg      Is BP treated: Yes      HDL Cholesterol: 62 mg/dL      Total Cholesterol: 208 mg/dL    Estimated body mass index is 33.77 kg/m  as calculated from the following:    Height as of this encounter: 1.74 m (5' 8.5\").    Weight as of this encounter: 102.2 kg (225 lb 6.4 oz).    Weight management plan: Discussed healthy diet and exercise guidelines    She reports that she has never smoked. She has never used smokeless tobacco.      Counseling Resources:  ATP IV Guidelines  Pooled Cohorts Equation Calculator  Breast Cancer Risk Calculator  BRCA-Related Cancer Risk Assessment: FHS-7 Tool  FRAX Risk Assessment  ICSI Preventive Guidelines  Dietary Guidelines for Americans, 2010  USDA's MyPlate  ASA Prophylaxis  Lung CA Screening    Nelida Celestin, " MD PR[facility]  Chart documentation done in part with Dragon Voice recognition Software. Although reviewed after completion, some word and grammatical error may remain.

## 2020-09-22 NOTE — PATIENT INSTRUCTIONS
Get the flu shot today  Schedule for fasting labs and recheck in 6 months      Preventive Health Recommendations  Female Ages 40 to 49    Yearly exam:     See your health care provider every year in order to  1. Review health changes.   2. Discuss preventive care.    3. Review your medicines if your doctor prescribed any.      Get a Pap test every three years (unless you have an abnormal result and your provider advises testing more often).      If you get Pap tests with HPV test, you only need to test every 5 years, unless you have an abnormal result. You do not need a Pap test if your uterus was removed (hysterectomy) and you have not had cancer.      You should be tested each year for STDs (sexually transmitted diseases), if you're at risk.     Ask your doctor if you should have a mammogram.      Have a colonoscopy (test for colon cancer) if someone in your family has had colon cancer or polyps before age 50.       Have a cholesterol test every 5 years.       Have a diabetes test (fasting glucose) after age 45. If you are at risk for diabetes, you should have this test every 3 years.    Shots: Get a flu shot each year. Get a tetanus shot every 10 years.     Nutrition:     Eat at least 5 servings of fruits and vegetables each day.    Eat whole-grain bread, whole-wheat pasta and brown rice instead of white grains and rice.    Get adequate Calcium and Vitamin D.      Lifestyle    Exercise at least 150 minutes a week (an average of 30 minutes a day, 5 days a week). This will help you control your weight and prevent disease.    Limit alcohol to one drink per day.    No smoking.     Wear sunscreen to prevent skin cancer.    See your dentist every six months for an exam and cleaning.

## 2020-09-23 PROBLEM — M62.830 BACK MUSCLE SPASM: Status: ACTIVE | Noted: 2020-09-23

## 2020-09-23 ASSESSMENT — ANXIETY QUESTIONNAIRES: GAD7 TOTAL SCORE: 9

## 2020-11-29 ENCOUNTER — HEALTH MAINTENANCE LETTER (OUTPATIENT)
Age: 45
End: 2020-11-29

## 2021-03-18 ENCOUNTER — DOCUMENTATION ONLY (OUTPATIENT)
Dept: LAB | Facility: CLINIC | Age: 46
End: 2021-03-18

## 2021-03-18 DIAGNOSIS — Z13.0 SCREENING FOR DEFICIENCY ANEMIA: Primary | ICD-10-CM

## 2021-03-18 DIAGNOSIS — I10 BENIGN ESSENTIAL HYPERTENSION: ICD-10-CM

## 2021-03-18 DIAGNOSIS — Z13.1 SCREENING FOR DIABETES MELLITUS (DM): ICD-10-CM

## 2021-03-18 DIAGNOSIS — Z13.6 CARDIOVASCULAR SCREENING; LDL GOAL LESS THAN 160: ICD-10-CM

## 2021-03-22 ENCOUNTER — OFFICE VISIT (OUTPATIENT)
Dept: FAMILY MEDICINE | Facility: CLINIC | Age: 46
End: 2021-03-22
Payer: COMMERCIAL

## 2021-03-22 VITALS
HEART RATE: 74 BPM | RESPIRATION RATE: 14 BRPM | OXYGEN SATURATION: 99 % | BODY MASS INDEX: 35.53 KG/M2 | DIASTOLIC BLOOD PRESSURE: 78 MMHG | WEIGHT: 237.1 LBS | SYSTOLIC BLOOD PRESSURE: 112 MMHG

## 2021-03-22 DIAGNOSIS — Z13.0 SCREENING FOR DEFICIENCY ANEMIA: ICD-10-CM

## 2021-03-22 DIAGNOSIS — J45.20 MILD INTERMITTENT ASTHMA WITHOUT COMPLICATION: Primary | ICD-10-CM

## 2021-03-22 DIAGNOSIS — J30.9 CHRONIC ALLERGIC RHINITIS: ICD-10-CM

## 2021-03-22 DIAGNOSIS — Z23 NEED FOR PNEUMOCOCCAL VACCINE: ICD-10-CM

## 2021-03-22 DIAGNOSIS — G43.709 CHRONIC MIGRAINE WITHOUT AURA WITHOUT STATUS MIGRAINOSUS, NOT INTRACTABLE: ICD-10-CM

## 2021-03-22 DIAGNOSIS — M62.830 BACK MUSCLE SPASM: ICD-10-CM

## 2021-03-22 DIAGNOSIS — F41.9 ANXIETY: ICD-10-CM

## 2021-03-22 DIAGNOSIS — I10 BENIGN ESSENTIAL HYPERTENSION: ICD-10-CM

## 2021-03-22 DIAGNOSIS — Z11.59 NEED FOR HEPATITIS C SCREENING TEST: ICD-10-CM

## 2021-03-22 DIAGNOSIS — Z13.1 SCREENING FOR DIABETES MELLITUS (DM): ICD-10-CM

## 2021-03-22 DIAGNOSIS — Z13.6 CARDIOVASCULAR SCREENING; LDL GOAL LESS THAN 160: ICD-10-CM

## 2021-03-22 DIAGNOSIS — F33.1 MODERATE RECURRENT MAJOR DEPRESSION (H): ICD-10-CM

## 2021-03-22 LAB
ALBUMIN SERPL-MCNC: 3.1 G/DL (ref 3.4–5)
ALP SERPL-CCNC: 66 U/L (ref 40–150)
ALT SERPL W P-5'-P-CCNC: 29 U/L (ref 0–50)
ANION GAP SERPL CALCULATED.3IONS-SCNC: 4 MMOL/L (ref 3–14)
AST SERPL W P-5'-P-CCNC: 17 U/L (ref 0–45)
BILIRUB SERPL-MCNC: 0.3 MG/DL (ref 0.2–1.3)
BUN SERPL-MCNC: 17 MG/DL (ref 7–30)
CALCIUM SERPL-MCNC: 8.6 MG/DL (ref 8.5–10.1)
CHLORIDE SERPL-SCNC: 111 MMOL/L (ref 94–109)
CHOLEST SERPL-MCNC: 204 MG/DL
CO2 SERPL-SCNC: 25 MMOL/L (ref 20–32)
CREAT SERPL-MCNC: 0.88 MG/DL (ref 0.52–1.04)
CREAT UR-MCNC: 114 MG/DL
ERYTHROCYTE [DISTWIDTH] IN BLOOD BY AUTOMATED COUNT: 13.5 % (ref 10–15)
GFR SERPL CREATININE-BSD FRML MDRD: 80 ML/MIN/{1.73_M2}
GLUCOSE SERPL-MCNC: 84 MG/DL (ref 70–99)
HCT VFR BLD AUTO: 40.5 % (ref 35–47)
HDLC SERPL-MCNC: 51 MG/DL
HGB BLD-MCNC: 12.9 G/DL (ref 11.7–15.7)
LDLC SERPL CALC-MCNC: 118 MG/DL
MCH RBC QN AUTO: 30.9 PG (ref 26.5–33)
MCHC RBC AUTO-ENTMCNC: 31.9 G/DL (ref 31.5–36.5)
MCV RBC AUTO: 97 FL (ref 78–100)
MICROALBUMIN UR-MCNC: <5 MG/L
MICROALBUMIN/CREAT UR: NORMAL MG/G CR (ref 0–25)
NONHDLC SERPL-MCNC: 153 MG/DL
PLATELET # BLD AUTO: 384 10E9/L (ref 150–450)
POTASSIUM SERPL-SCNC: 4 MMOL/L (ref 3.4–5.3)
PROT SERPL-MCNC: 6.7 G/DL (ref 6.8–8.8)
RBC # BLD AUTO: 4.18 10E12/L (ref 3.8–5.2)
SODIUM SERPL-SCNC: 140 MMOL/L (ref 133–144)
TRIGL SERPL-MCNC: 174 MG/DL
WBC # BLD AUTO: 9 10E9/L (ref 4–11)

## 2021-03-22 PROCEDURE — 80053 COMPREHEN METABOLIC PANEL: CPT | Performed by: FAMILY MEDICINE

## 2021-03-22 PROCEDURE — 90732 PPSV23 VACC 2 YRS+ SUBQ/IM: CPT | Performed by: FAMILY MEDICINE

## 2021-03-22 PROCEDURE — 80061 LIPID PANEL: CPT | Performed by: FAMILY MEDICINE

## 2021-03-22 PROCEDURE — 36415 COLL VENOUS BLD VENIPUNCTURE: CPT | Performed by: FAMILY MEDICINE

## 2021-03-22 PROCEDURE — 82043 UR ALBUMIN QUANTITATIVE: CPT | Performed by: FAMILY MEDICINE

## 2021-03-22 PROCEDURE — 96127 BRIEF EMOTIONAL/BEHAV ASSMT: CPT | Performed by: FAMILY MEDICINE

## 2021-03-22 PROCEDURE — 90471 IMMUNIZATION ADMIN: CPT | Performed by: FAMILY MEDICINE

## 2021-03-22 PROCEDURE — 99214 OFFICE O/P EST MOD 30 MIN: CPT | Mod: 25 | Performed by: FAMILY MEDICINE

## 2021-03-22 PROCEDURE — 86803 HEPATITIS C AB TEST: CPT | Performed by: FAMILY MEDICINE

## 2021-03-22 PROCEDURE — 85027 COMPLETE CBC AUTOMATED: CPT | Performed by: FAMILY MEDICINE

## 2021-03-22 RX ORDER — BUPROPION HYDROCHLORIDE 300 MG/1
300 TABLET ORAL EVERY MORNING
Qty: 90 TABLET | Refills: 2 | Status: SHIPPED | OUTPATIENT
Start: 2021-03-22 | End: 2021-06-08 | Stop reason: DRUGHIGH

## 2021-03-22 RX ORDER — CYCLOBENZAPRINE HCL 10 MG
10 TABLET ORAL 2 TIMES DAILY PRN
Qty: 30 TABLET | Refills: 1 | Status: SHIPPED | OUTPATIENT
Start: 2021-03-22 | End: 2022-03-01

## 2021-03-22 RX ORDER — LISINOPRIL 20 MG/1
10 TABLET ORAL DAILY
Qty: 45 TABLET | Refills: 3 | Status: SHIPPED | OUTPATIENT
Start: 2021-03-22 | End: 2021-09-29

## 2021-03-22 RX ORDER — RIZATRIPTAN BENZOATE 10 MG/1
10 TABLET, ORALLY DISINTEGRATING ORAL SEE ADMIN INSTRUCTIONS
Qty: 6 TABLET | Refills: 6 | Status: SHIPPED | OUTPATIENT
Start: 2021-03-22 | End: 2021-09-29

## 2021-03-22 RX ORDER — MOMETASONE FUROATE MONOHYDRATE 50 UG/1
2 SPRAY, METERED NASAL DAILY PRN
Qty: 17 G | Refills: 6 | Status: SHIPPED | OUTPATIENT
Start: 2021-03-22 | End: 2021-09-29

## 2021-03-22 RX ORDER — CITALOPRAM HYDROBROMIDE 20 MG/1
20 TABLET ORAL EVERY EVENING
Qty: 90 TABLET | Refills: 2 | Status: SHIPPED | OUTPATIENT
Start: 2021-03-22 | End: 2021-09-29

## 2021-03-22 ASSESSMENT — ASTHMA QUESTIONNAIRES
ACT_TOTALSCORE: 25
QUESTION_3 LAST FOUR WEEKS HOW OFTEN DID YOUR ASTHMA SYMPTOMS (WHEEZING, COUGHING, SHORTNESS OF BREATH, CHEST TIGHTNESS OR PAIN) WAKE YOU UP AT NIGHT OR EARLIER THAN USUAL IN THE MORNING: NOT AT ALL
QUESTION_5 LAST FOUR WEEKS HOW WOULD YOU RATE YOUR ASTHMA CONTROL: COMPLETELY CONTROLLED
QUESTION_1 LAST FOUR WEEKS HOW MUCH OF THE TIME DID YOUR ASTHMA KEEP YOU FROM GETTING AS MUCH DONE AT WORK, SCHOOL OR AT HOME: NONE OF THE TIME
QUESTION_4 LAST FOUR WEEKS HOW OFTEN HAVE YOU USED YOUR RESCUE INHALER OR NEBULIZER MEDICATION (SUCH AS ALBUTEROL): NOT AT ALL
QUESTION_2 LAST FOUR WEEKS HOW OFTEN HAVE YOU HAD SHORTNESS OF BREATH: NOT AT ALL

## 2021-03-22 ASSESSMENT — PATIENT HEALTH QUESTIONNAIRE - PHQ9: SUM OF ALL RESPONSES TO PHQ QUESTIONS 1-9: 12

## 2021-03-22 NOTE — PROGRESS NOTES
Assessment & Plan     Mild intermittent asthma without complication  Stable, continue to use albuterol inhaler as needed, pneumococcal vaccination updated today  - PPSV23, IM/SUBQ (2+ YRS) - [id number]  - Asthma Action Plan (AAP)    Need for pneumococcal vaccine    - PPSV23, IM/SUBQ (2+ YRS) - [id number]    Moderate recurrent major depression (H)  PHQ 2/5/2020 9/22/2020 3/22/2021   PHQ-9 Total Score 7 12 12   Q9: Thoughts of better off dead/self-harm past 2 weeks Not at all Not at all Not at all     Needing improvement  Recommended to increase her dose of Wellbutrin from 150 mg up to 300 mg once daily in the morning, continue with current dose of Celexa 20 mg daily in the evening  Patient will send a [employer] message for therapeutic update in 5 to 6 weeks  If stable, follow-up for 6-month recheck at the time of physical in September continue with regular exercises, healthy eating, good sleep hygiene    .Patient verbalised understanding and is agreeable to the plan.    - citalopram (CELEXA) 20 MG tablet; Take 1 tablet (20 mg) by mouth every evening  - buPROPion (WELLBUTRIN XL) 300 MG 24 hr tablet; Take 1 tablet (300 mg) by mouth every morning    Anxiety  as above    - citalopram (CELEXA) 20 MG tablet; Take 1 tablet (20 mg) by mouth every evening  - buPROPion (WELLBUTRIN XL) 300 MG 24 hr tablet; Take 1 tablet (300 mg) by mouth every morning    Back muscle spasm    - cyclobenzaprine (FLEXERIL) 10 MG tablet; Take 1 tablet (10 mg) by mouth 2 times daily as needed for muscle spasms    Benign essential hypertension  BP Readings from Last 6 Encounters:   03/22/21 112/78   09/22/20 123/83   02/05/20 120/82   01/08/20 (!) 143/91   11/20/19 137/89   10/29/19 (!) 136/98     Blood pressure is at goal, continue with current dose of lisinopril 10 mg daily, Will follow low salt diet, weight loss and regular exercises.  Recheck in 6 months or sooner if needed  - lisinopril (ZESTRIL) 20 MG tablet; Take 0.5 tablets (10 mg) by  "mouth daily    Chronic allergic rhinitis  Stable, continue with Nasonex nasal spray  - mometasone (NASONEX) 50 MCG/ACT nasal spray; Spray 2 sprays into both nostrils daily as needed    Chronic migraine without aura without status migrainosus, not intractable  Stable, continue with Maxalt as needed for episodic migraine headaches  Patient has noted a recent increase in frequency-3-4 times a month  She will keep a headache diary and look for triggers   we will consider migraine prophylaxis medicine to take once daily for worsening concerns    - rizatriptan (MAXALT-MLT) 10 MG ODT; Take 1 tablet (10 mg) by mouth See Admin Instructions WITH ONSET OF MIGRAINE, MAY REPEAT ONCE AFTER 2 HOURS. DO NOT EXCEED 3 TABLETS IN 24 HOURS.    Need for hepatitis C screening test    - Hepatitis C antibody    Review of the result(s) of each unique test - CBC, microalbumin, CMP, lipid panel         BMI:   Estimated body mass index is 35.53 kg/m  as calculated from the following:    Height as of 9/22/20: 1.74 m (5' 8.5\").    Weight as of this encounter: 107.5 kg (237 lb 1.6 oz).   Weight management plan: Discussed healthy diet and exercise guidelines    Work on weight loss  Regular exercise  Chart documentation done in part with Dragon Voice recognition Software. Although reviewed after completion, some word and grammatical error may remain.    See Patient Instructions    Return in about 6 months (around 9/22/2021) for Physical Exam.    Nelida Celestin MD  [facility] SAMUEL Amanda is a 45 year old who presents for the following health issues     HPI     Hypertension Follow-up      Do you check your blood pressure regularly outside of the clinic? Yes     Are you following a low salt diet? Yes    Are your blood pressures ever more than 140 on the top number (systolic) OR more   than 90 on the bottom number (diastolic), for example 140/90? No      Asthma Follow-Up    Was ACT completed today?    Yes    ACT " Total Scores 3/22/2021   ACT TOTAL SCORE -   ASTHMA ER VISITS -   ASTHMA HOSPITALIZATIONS -   ACT TOTAL SCORE (Goal Greater than or Equal to 20) 25   In the past 12 months, how many times did you visit the emergency room for your asthma without being admitted to the hospital? 0   In the past 12 months, how many times were you hospitalized overnight because of your asthma? 0          How many days per week do you miss taking your asthma controller medication?  I do not have an asthma controller medication    Please describe any recent triggers for your asthma: None    Have you had any Emergency Room Visits, Urgent Care Visits, or Hospital Admissions since your last office visit?  No      Depression and Anxiety Follow-Up    How are you doing with your depression since your last visit? Worsened     How are you doing with your anxiety since your last visit?  No change    Are you having other symptoms that might be associated with depression or anxiety? No    Have you had a significant life event? No     Do you have any concerns with your use of alcohol or other drugs? No    Social History     Tobacco Use     Smoking status: Never Smoker     Smokeless tobacco: Never Used   Substance Use Topics     Alcohol use: Yes     Comment: 3-4 drinks per month     Drug use: No     PHQ 2/5/2020 9/22/2020 3/22/2021   PHQ-9 Total Score 7 12 12   Q9: Thoughts of better off dead/self-harm past 2 weeks Not at all Not at all Not at all     NATANAEL-7 SCORE 1/8/2020 2/5/2020 9/22/2020   Total Score - - -   Total Score 8 5 9     Last PHQ-9 3/22/2021   1.  Little interest or pleasure in doing things 1   2.  Feeling down, depressed, or hopeless 1   3.  Trouble falling or staying asleep, or sleeping too much 2   4.  Feeling tired or having little energy 3   5.  Poor appetite or overeating 2   6.  Feeling bad about yourself 0   7.  Trouble concentrating 2   8.  Moving slowly or restless 1   Q9: Thoughts of better off dead/self-harm past 2 weeks 0    PHQ-9 Total Score 12   Difficulty at work, home, or with people Somewhat difficult     NATANAEL-7  9/22/2020   1. Feeling nervous, anxious, or on edge 2   2. Not being able to stop or control worrying 1   3. Worrying too much about different things 1   4. Trouble relaxing 1   5. Being so restless that it is hard to sit still 2   6. Becoming easily annoyed or irritable 1   7. Feeling afraid, as if something awful might happen 1   NATANAEL-7 Total Score 9   If you checked any problems, how difficult have they made it for you to do your work, take care of things at home, or get along with other people? Somewhat difficult       Suicide Assessment Five-step Evaluation and Treatment (SAFE-T)    Migraine     Since your last clinic visit, how have your headaches changed?  Worsened    How often are you getting headaches or migraines?  3To 4 times a month    Are you able to do normal daily activities when you have a migraine? sometimes    Are you taking rescue/relief medications? (Select all that apply) Maxalt    How helpful is your rescue/relief medication?  I get some relief    Are you taking any medications to prevent migraines? (Select all that apply)  No    In the past 4 weeks, how often have you gone to urgent care or the emergency room because of your headaches?  0                   Review of Systems   CONSTITUTIONAL: NEGATIVE for fever, chills, change in weight  INTEGUMENTARY/SKIN: NEGATIVE for worrisome rashes, moles or lesions  EYES: NEGATIVE for vision changes or irritation  ENT/MOUTH: NEGATIVE for ear, mouth and throat problems  ENT/MOUTH: History of allergic rhinitis  RESP: NEGATIVE for significant cough or SOB  RESP: History of asthma  CV: NEGATIVE for chest pain, palpitations or peripheral edema  CV: History of hypertension  GI: NEGATIVE for nausea, abdominal pain, heartburn, or change in bowel habits  MUSCULOSKELETAL: NEGATIVE for significant arthralgias or myalgia  NEURO: History of migraine headaches  ENDOCRINE:  NEGATIVE for temperature intolerance, skin/hair changes  HEME/ALLERGY/IMMUNE: NEGATIVE for bleeding problems  PSYCHIATRIC: History of depression and anxiety      Objective    /78   Pulse 74   Resp 14   Wt 107.5 kg (237 lb 1.6 oz)   SpO2 99%   BMI 35.53 kg/m    Body mass index is 35.53 kg/m .  Physical Exam   GENERAL: healthy, alert and no distress  RESP: lungs clear to auscultation - no rales, rhonchi or wheezes  CV: regular rate and rhythm, normal S1 S2, no S3 or S4, no murmur, click or rub, no peripheral edema and peripheral pulses strong  MS-normal extremities, no leg edema  NEURO: Normal strength and tone, mentation intact and speech normal  PSYCH: mentation appears normal, affect normal/bright    Results for orders placed or performed in visit on 03/22/21   Albumin Random Urine Quantitative with Creat Ratio     Status: None   Result Value Ref Range    Creatinine Urine 114 mg/dL    Albumin Urine mg/L <5 mg/L    Albumin Urine mg/g Cr Unable to calculate due to low value 0 - 25 mg/g Cr   Lipid panel reflex to direct LDL Fasting     Status: Abnormal   Result Value Ref Range    Cholesterol 204 (H) <200 mg/dL    Triglycerides 174 (H) <150 mg/dL    HDL Cholesterol 51 >49 mg/dL    LDL Cholesterol Calculated 118 (H) <100 mg/dL    Non HDL Cholesterol 153 (H) <130 mg/dL   **Comprehensive metabolic panel FUTURE anytime     Status: Abnormal   Result Value Ref Range    Sodium 140 133 - 144 mmol/L    Potassium 4.0 3.4 - 5.3 mmol/L    Chloride 111 (H) 94 - 109 mmol/L    Carbon Dioxide 25 20 - 32 mmol/L    Anion Gap 4 3 - 14 mmol/L    Glucose 84 70 - 99 mg/dL    Urea Nitrogen 17 7 - 30 mg/dL    Creatinine 0.88 0.52 - 1.04 mg/dL    GFR Estimate 80 >60 mL/min/[1.73_m2]    GFR Estimate If Black >90 >60 mL/min/[1.73_m2]    Calcium 8.6 8.5 - 10.1 mg/dL    Bilirubin Total 0.3 0.2 - 1.3 mg/dL    Albumin 3.1 (L) 3.4 - 5.0 g/dL    Protein Total 6.7 (L) 6.8 - 8.8 g/dL    Alkaline Phosphatase 66 40 - 150 U/L    ALT 29 0 - 50  U/L    AST 17 0 - 45 U/L   **CBC with platelets FUTURE anytime     Status: None   Result Value Ref Range    WBC 9.0 4.0 - 11.0 10e9/L    RBC Count 4.18 3.8 - 5.2 10e12/L    Hemoglobin 12.9 11.7 - 15.7 g/dL    Hematocrit 40.5 35.0 - 47.0 %    MCV 97 78 - 100 fl    MCH 30.9 26.5 - 33.0 pg    MCHC 31.9 31.5 - 36.5 g/dL    RDW 13.5 10.0 - 15.0 %    Platelet Count 384 150 - 450 10e9/L

## 2021-03-22 NOTE — LETTER
My Asthma Action Plan  Name: Treasure Pradhan   YOB: 1975  Date: 3/22/2021   My doctor: Nelida Celestin   My clinic: [facility]      My Control Medicine: none        Dose:   My Rescue Medicine: Albuterol        Dose:    My Asthma Severity: Intermittent / Exercise Induced  Avoid your asthma triggers: upper respiratory infections        GREEN ZONE   Good Control    I feel good    No cough or wheeze    Can work, sleep and play without asthma symptoms       Take your asthma control medicine every day.     1. If exercise triggers your asthma, take your rescue medication    15 minutes before exercise or sports, and    During exercise if you have asthma symptoms  2. Spacer to use with inhaler: If you have a spacer, make sure to use it with your inhaler             YELLOW ZONE Getting Worse  I have ANY of these:    I do not feel good    Cough or wheeze    Chest feels tight    Wake up at night   1. Keep taking your Green Zone medications  2. Start taking your rescue medicine:    every 20 minutes for up to 1 hour. Then every 4 hours for 24-48 hours.  3. If you stay in the Yellow Zone for more than 12-24 hours, contact your doctor.  4. If you do not return to the Green Zone in 12-24 hours or you get worse, start taking your oral steroid medicine if prescribed by your provider.           RED ZONE Medical Alert - Get Help  I have ANY of these:    I feel awful    Medicine is not helping    Breathing getting harder    Trouble walking or talking    Nose opens wide to breathe       1. Take your rescue medicine NOW  2. If your provider has prescribed an oral steroid medicine, start taking it NOW  3. Call your doctor NOW  4. If you are still in the Red Zone after 20 minutes and you have not reached your doctor:    Take your rescue medicine again and    Call 911 or go to the emergency room right away    See your regular doctor within 2 weeks of an Emergency Room or Urgent Care visit for  follow-up treatment.        The above medication may be given at school or day care?: N/A (Adult Patient)  Child can carry and use inhaler(s) at school with approval of school nurse?: N/A (Adult Patient)    Electronically signed by: Nelida Celestin MD, March 22, 2021    Annual Reminders:  Meet with Asthma Educator,  Flu Shot in the Fall, consider Pneumonia Vaccination for patients with asthma (aged 19 and older).    Pharmacy: [id number] [address]                    Asthma Triggers  How To Control Things That Make Your Asthma Worse    Triggers are things that make your asthma worse.  Look at the list below to help you find your triggers and what you can do about them.  You can help prevent asthma flare-ups by staying away from your triggers.      Trigger                                                          What you can do   Cigarette Smoke  Tobacco smoke can make asthma worse. Do not allow smoking in your home, car or around you.  Be sure no one smokes at a child s day care or school.  If you smoke, ask your health care provider for ways to help you quit.  Ask family members to quit too.  Ask your health care provider for a referral to Quit Plan to help you quit smoking, or call 1-323-578-PLAN.     Colds, Flu, Bronchitis  These are common triggers of asthma. Wash your hands often.  Don t touch your eyes, nose or mouth.  Get a flu shot every year.     Dust Mites  These are tiny bugs that live in cloth or carpet. They are too small to see. Wash sheets and blankets in hot water every week.   Encase pillows and mattress in dust mite proof covers.  Avoid having carpet if you can. If you have carpet, vacuum weekly.   Use a dust mask and HEPA vacuum.   Pollen and Outdoor Mold  Some people are allergic to trees, grass, or weed pollen, or molds. Try to keep your windows closed.  Limit time out doors when pollen count is high.   Ask you health care provider about taking  medicine during allergy season.     Animal Dander  Some people are allergic to skin flakes, urine or saliva from pets with fur or feathers. Keep pets with fur or feathers out of your home.    If you can t keep the pet outdoors, then keep the pet out of your bedroom.  Keep the bedroom door closed.  Keep pets off cloth furniture and away from stuffed toys.     Mice, Rats, and Cockroaches  Some people are allergic to the waste from these pests.   Cover food and garbage.  Clean up spills and food crumbs.  Store grease in the refrigerator.   Keep food out of the bedroom.   Indoor Mold  This can be a trigger if your home has high moisture. Fix leaking faucets, pipes, or other sources of water.   Clean moldy surfaces.  Dehumidify basement if it is damp and smelly.   Smoke, Strong Odors, and Sprays  These can reduce air quality. Stay away from strong odors and sprays, such as perfume, powder, hair spray, paints, smoke incense, paint, cleaning products, candles and new carpet.   Exercise or Sports  Some people with asthma have this trigger. Be active!  Ask your doctor about taking medicine before sports or exercise to prevent symptoms.    Warm up for 5-10 minutes before and after sports or exercise.     Other Triggers of Asthma  Cold air:  Cover your nose and mouth with a scarf.  Sometimes laughing or crying can be a trigger.  Some medicines and food can trigger asthma.

## 2021-03-22 NOTE — NURSING NOTE
Prior to immunization administration, verified patients identity using patient s name and date of birth. Please see Immunization Activity for additional information.     Screening Questionnaire for Adult Immunization    Are you sick today?   No   Do you have allergies to medications, food, a vaccine component or latex?   No   Have you ever had a serious reaction after receiving a vaccination?   No   Do you have a long-term health problem with heart, lung, kidney, or metabolic disease (e.g., diabetes), asthma, a blood disorder, no spleen, complement component deficiency, a cochlear implant, or a spinal fluid leak?  Are you on long-term aspirin therapy?   No   Do you have cancer, leukemia, HIV/AIDS, or any other immune system problem?   No   Do you have a parent, brother, or sister with an immune system problem?   No   In the past 3 months, have you taken medications that affect  your immune system, such as prednisone, other steroids, or anticancer drugs; drugs for the treatment of rheumatoid arthritis, Crohn s disease, or psoriasis; or have you had radiation treatments?   No   Have you had a seizure, or a brain or other nervous system problem?   No   During the past year, have you received a transfusion of blood or blood    products, or been given immune (gamma) globulin or antiviral drug?   No   For women: Are you pregnant or is there a chance you could become       pregnant during the next month?   No   Have you received any vaccinations in the past 4 weeks?   No     Immunization questionnaire answers were all negative.        Per orders of Dr. Celestin, injection of Pneumovax 23 given by Leena Licona CMA. Patient instructed to remain in clinic for 15 minutes afterwards, and to report any adverse reaction to me immediately.       Screening performed by Leena Licona CMA on 3/22/2021 at 11:12 AM.

## 2021-03-22 NOTE — PATIENT INSTRUCTIONS
Get the PCV 23 shot today  Schedule for physical, in 6 months      Preventive Health Recommendations  Female Ages 40 to 49    Yearly exam:     See your health care provider every year in order to  1. Review health changes.   2. Discuss preventive care.    3. Review your medicines if your doctor prescribed any.      Get a Pap test every three years (unless you have an abnormal result and your provider advises testing more often).      If you get Pap tests with HPV test, you only need to test every 5 years, unless you have an abnormal result. You do not need a Pap test if your uterus was removed (hysterectomy) and you have not had cancer.      You should be tested each year for STDs (sexually transmitted diseases), if you're at risk.     Ask your doctor if you should have a mammogram.      Have a colonoscopy (test for colon cancer) if someone in your family has had colon cancer or polyps before age 50.       Have a cholesterol test every 5 years.       Have a diabetes test (fasting glucose) after age 45. If you are at risk for diabetes, you should have this test every 3 years.    Shots: Get a flu shot each year. Get a tetanus shot every 10 years.     Nutrition:     Eat at least 5 servings of fruits and vegetables each day.    Eat whole-grain bread, whole-wheat pasta and brown rice instead of white grains and rice.    Get adequate Calcium and Vitamin D.      Lifestyle    Exercise at least 150 minutes a week (an average of 30 minutes a day, 5 days a week). This will help you control your weight and prevent disease.    Limit alcohol to one drink per day.    No smoking.     Wear sunscreen to prevent skin cancer.    See your dentist every six months for an exam and cleaning.

## 2021-03-22 NOTE — PROGRESS NOTES
"   SUBJECTIVE:   CC: Treasure Pradhan is an 45 year old woman who presents for preventive health visit.     {Split Bill scripting  The purpose of this visit is to discuss your medical history and prevent health problems before you are sick. You may be responsible for a co-pay, coinsurance, or deductible if your visit today includes services such as checking on a sore throat, having an x-ray or lab test, or treating and evaluating a new or existing condition :484331}  Patient has been advised of split billing requirements and indicates understanding: {Yes and No:173317}  Healthy Habits:    Do you get at least three servings of calcium containing foods daily (dairy, green leafy vegetables, etc.)? { :699230::\"yes\"}    Amount of exercise or daily activities, outside of work: { :203607}    Problems taking medications regularly { :232013::\"No\"}    Medication side effects: { :082825::\"No\"}    Have you had an eye exam in the past two years? { :257682}    Do you see a dentist twice per year? { :608332}    Do you have sleep apnea, excessive snoring or daytime drowsiness?{ :397849}  {Outside tests to abstract? :141184}    {additional problems to add (Optional):974811}    Today's PHQ-2 Score:   PHQ-2 ( 1999 Pfizer) 9/22/2020 9/22/2020   Q1: Little interest or pleasure in doing things 1 2   Q2: Feeling down, depressed or hopeless 1 2   PHQ-2 Score 2 4     {PHQ-2 LOOK IN ASSESSMENTS (Optional) :650254}  Abuse: Current or Past(Physical, Sexual or Emotional)- {YES/NO/NA:659269}  Do you feel safe in your environment? {YES/NO/NA:461511}    Have you ever done Advance Care Planning? (For example, a Health Directive, POLST, or a discussion with a medical provider or your loved ones about your wishes): { :918484}    Social History     Tobacco Use     Smoking status: Never Smoker     Smokeless tobacco: Never Used   Substance Use Topics     Alcohol use: Yes     Comment: 3-4 drinks per month     If you drink alcohol do you typically have " ">3 drinks per day or >7 drinks per week? {ETOH :941563}                     Reviewed orders with patient.  Reviewed health maintenance and updated orders accordingly - {Yes/No:047829::\"Yes\"}  {Chronicprobdata (Optional):609431}    Breast CA Risk Screening:  No flowsheet data found.  {Pull in link for FHS-7 answers if completed after opening note (Optional) :803246}  {If any of the questions to the BCRA (FHS-7) are answered yes, consider ordering referral for genetic counseling (Optional) :686009::\"click delete button to remove this line now\"}  {AMB Mammogram Decision Support (Optional) :583123}  Pertinent mammograms are reviewed under the imaging tab.    Pertinent mammograms are reviewed under the imaging tab.  History of abnormal Pap smear: {PAP HX:750752}  PAP / HPV Latest Ref Rng & Units 2/23/2018 6/3/2015 4/24/2012   PAP - NIL NIL NIL   HPV 16 DNA NEG:Negative Negative - -   HPV 18 DNA NEG:Negative Negative - -   OTHER HR HPV NEG:Negative Negative - -     Reviewed and updated as needed this visit by clinical staff                 Reviewed and updated as needed this visit by Provider                {HISTORY OPTIONS (Optional):115053}    ROS:  { :719324}    OBJECTIVE:   There were no vitals taken for this visit.  EXAM:  {Exam Choices:179402}    {Diagnostic Test Results (Optional):390105::\"Diagnostic Test Results:\",\"Labs reviewed in Epic\"}    ASSESSMENT/PLAN:   {Diag Picklist:938762}    Patient has been advised of split billing requirements and indicates understanding: {YES / NO:244669::\"Yes\"}  COUNSELING:   {FEMALE COUNSELING MESSAGES:762902::\"Reviewed preventive health counseling, as reflected in patient instructions\"}    Estimated body mass index is 33.77 kg/m  as calculated from the following:    Height as of 9/22/20: 1.74 m (5' 8.5\").    Weight as of 9/22/20: 102.2 kg (225 lb 6.4 oz).    {Weight Management Plan (ACO) Complete if BMI is abnormal-  Ages 18-64  BMI >24.9.  Age 65+ with BMI <23 or >30 " (Optional):685200}    She reports that she has never smoked. She has never used smokeless tobacco.      Counseling Resources:  ATP IV Guidelines  Pooled Cohorts Equation Calculator  Breast Cancer Risk Calculator  BRCA-Related Cancer Risk Assessment: FHS-7 Tool  FRAX Risk Assessment  ICSI Preventive Guidelines  Dietary Guidelines for Americans, 2010  USDA's MyPlate  ASA Prophylaxis  Lung CA Screening    Nleida Celestin MD  [facility]

## 2021-03-23 LAB — HCV AB SERPL QL IA: NONREACTIVE

## 2021-03-23 ASSESSMENT — ASTHMA QUESTIONNAIRES: ACT_TOTALSCORE: 25

## 2021-04-22 ENCOUNTER — TELEPHONE (OUTPATIENT)
Dept: FAMILY MEDICINE | Facility: CLINIC | Age: 46
End: 2021-04-22

## 2021-04-22 NOTE — TELEPHONE ENCOUNTER
Patient Quality Outreach      Summary:    Patient has the following on her problem list/HM:     Depression / Dysthymia review    6 Month Remission: 4-8 month window range:   12 Month Remission: 10-14 month window range:        PHQ-9 SCORE 2/5/2020 9/22/2020 3/22/2021   PHQ-9 Total Score - - -   PHQ-9 Total Score 7 12 12       If PHQ-9 recheck is 5 or more, route to provider for next steps.    Patient is due/failing the following:   PHQ-9 Needed    Type of outreach:    Sent [employer] message.    Questions for provider review:    None                                                                                                                                     Misti Leone

## 2021-06-11 NOTE — PROGRESS NOTES
Please order future labs for patient coming in on 03/22/21, thank you. -Loretta LAU   48 yo M with PMH of alcoholic liver failure presents to ED for weakness. Pt states for the past 3 days pt has noticed b/l lower extremity weakness. Pt states that e was driving a car and had to pull over because he felt he could not feel his feet. Associated with b/l foot pins and needles. He has also noticed during this time he has a wide stepping gait and more difficulty walking. No blader or bowel incontinence or retention. No HA, back pain, fevers, chills, confusion, CP, SOB.   Pt states his last drink of alcohol was 3 years ago.     Const: Well nourished, well developed, appears stated age  Eyes: PERRL, no conjunctival injection  HENT:  Neck supple without meningismus   CV: RRR, Warm, well-perfused extremities  RESP: CTA B/L, no tachypnea   GI: soft, non-tender, non-distended  MSK: No gross deformities appreciated  Skin: Warm, dry. No rashes  Neuro: Alert, CNs II-XII grossly intact. Wide stepping gait  Psych: Appropriate mood and affect.    Concern for Wernickes encephalopathy and electrolyte abnormalities. CVA considered.   Will do labs, CT head

## 2021-08-25 ENCOUNTER — OFFICE VISIT (OUTPATIENT)
Dept: URGENT CARE | Facility: URGENT CARE | Age: 46
End: 2021-08-25
Payer: COMMERCIAL

## 2021-08-25 ENCOUNTER — ANCILLARY PROCEDURE (OUTPATIENT)
Dept: GENERAL RADIOLOGY | Facility: CLINIC | Age: 46
End: 2021-08-25
Attending: FAMILY MEDICINE
Payer: COMMERCIAL

## 2021-08-25 VITALS
TEMPERATURE: 98.1 F | WEIGHT: 245 LBS | OXYGEN SATURATION: 96 % | SYSTOLIC BLOOD PRESSURE: 121 MMHG | DIASTOLIC BLOOD PRESSURE: 83 MMHG | BODY MASS INDEX: 36.71 KG/M2 | HEART RATE: 74 BPM | RESPIRATION RATE: 16 BRPM

## 2021-08-25 DIAGNOSIS — M25.561 ACUTE PAIN OF RIGHT KNEE: ICD-10-CM

## 2021-08-25 DIAGNOSIS — M25.561 ACUTE PAIN OF RIGHT KNEE: Primary | ICD-10-CM

## 2021-08-25 DIAGNOSIS — M25.461 EFFUSION OF RIGHT KNEE: ICD-10-CM

## 2021-08-25 PROCEDURE — 73562 X-RAY EXAM OF KNEE 3: CPT | Mod: RT | Performed by: RADIOLOGY

## 2021-08-25 PROCEDURE — 99214 OFFICE O/P EST MOD 30 MIN: CPT | Performed by: FAMILY MEDICINE

## 2021-08-25 ASSESSMENT — PAIN SCALES - GENERAL: PAINLEVEL: MODERATE PAIN (5)

## 2021-08-26 NOTE — PATIENT INSTRUCTIONS
Elevate the leg when at rest/ prop up with a pillow        Ice the area if needed if new swelling develops        If symptoms don't improve in the next week  Call [phone] to schedule an appointment with the Sports medicine specialists

## 2021-08-26 NOTE — PROGRESS NOTES
Assessment & Plan     Acute pain of right knee  - XR Knee Right 3 Views    Effusion of right knee   Strain/overuse suspected . No mechanism to suggest meniscal or ligament tear. Over-the-counter analgesics as needed, option of using home knee brace if this is helpful. Phone number for sports medicine was given in the event symptoms do not improve in the next week.      Isaiah Garduno MD   [facility]    Subjective     Treasure is a 45 year old female who presents to clinic today for the following health issues:  Chief Complaint   Patient presents with     Knee Pain     Right knee pain since last Tuesday, no known injury. Painful with movement and swollen.     HPI    No known injuries before pain started. Hurts when even laying down. Appears swollen. Stands a lot for her job at work. No previous hx of ligament tears. No bruising seen. Pain is primarily anterior/lateral    Grew up playing a lot of sports (basketball, cross country, softball as a catcher)     No recent repetitive activities including lifting or kneeling.     Hx of gout in great toe, this level of discomfort Is much different      Discomfort she rates at moderate -- hurts more when standing    Meds attempted: 600mg ibuprofen three times a day   Patient Active Problem List    Diagnosis Date Noted     Back muscle spasm 09/23/2020     Priority: Medium     Benign essential hypertension 10/29/2019     Priority: Medium     Obesity (BMI 35.0-39.9) with comorbidity (H) 10/29/2019     Priority: Medium     Chronic right hip pain 03/08/2019     Priority: Medium     Chronic allergic rhinitis 02/23/2018     Priority: Medium     Skin lesion, left thigh 02/23/2018     Priority: Medium     Pigmented skin lesion, midback, 3mm 07/15/2016     Priority: Medium     Encounter for surveillance of other contraceptive 05/18/2016     Priority: Medium     Migraine without aura and without status migrainosus, not intractable 11/03/2015     Priority: Medium      Degeneration of thoracic or thoracolumbar intervertebral disc 06/03/2015     Priority: Medium     DDD (degenerative disc disease), cervical 06/03/2015     Priority: Medium     Dyslipidemia 03/04/2014     Priority: Medium     Diagnosis updated by automated process. Provider to review and confirm.       Onychomycosis 03/04/2014     Priority: Medium     Therapeutic drug monitoring 03/04/2014     Priority: Medium     Moderate recurrent major depression (H) 01/14/2014     Priority: Medium     Generalized anxiety disorder 01/14/2014     Priority: Medium     Diagnosis updated by automated process. Provider to review and confirm.       Anal pain 11/18/2012     Priority: Medium     Back pain      Priority: Medium     Anxiety 08/13/2010     Priority: Medium     Mild intermittent asthma without complication      Priority: Medium     Migraine      Priority: Medium       Current Outpatient Medications   Medication     albuterol (PROAIR HFA) 108 (90 Base) MCG/ACT inhaler     aspirin-acetaminophen-caffeine (EXCEDRIN MIGRAINE) 250-250-65 MG tablet     buPROPion (WELLBUTRIN XL) 150 MG 24 hr tablet     citalopram (CELEXA) 20 MG tablet     cyclobenzaprine (FLEXERIL) 10 MG tablet     diclofenac (VOLTAREN) 1 % topical gel     etonogestrel-ethinyl estradiol (NUVARING) 0.12-0.015 MG/24HR vaginal ring     fexofenadine (ALLEGRA) 180 MG tablet     ibuprofen (ADVIL/MOTRIN) 200 MG tablet     lisinopril (ZESTRIL) 20 MG tablet     MAGNESIUM PO     melatonin 5 MG tablet     mometasone (NASONEX) 50 MCG/ACT nasal spray     Multiple Vitamins-Minerals (MULTIVITAMIN ADULT PO)     nystatin (MYCOSTATIN) 556785 UNIT/GM POWD     rizatriptan (MAXALT-MLT) 10 MG ODT     docusate sodium (COLACE) 50 MG capsule     No current facility-administered medications for this visit.           Objective    /83 (BP Location: Left arm, Patient Position: Sitting, Cuff Size: Adult Large)   Pulse 74   Temp 98.1  F (36.7  C) (Tympanic)   Resp 16   Wt 111.1 kg (245  lb)   SpO2 96%   Breastfeeding No   BMI 36.71 kg/m    Physical Exam     R knee :full ROM in extension/flexion, moderate swelling compared to L side, no ligamentous laxity in varus/valgus stress. Lachman's negative. No bruising. [facility]'s negative.     Results for orders placed or performed in visit on 08/25/21   XR Knee Right 3 Views     Status: None    Narrative    Examination:  XR KNEE RIGHT 3 VIEWS    Date:  8/25/2021 7:17 PM     Clinical Information: Knee pain, swelling, no known injury.    Comparison: none.      Impression    Impression:    1.  Small knee joint effusion. Normal joint alignment with maintained  joint spacing. No fracture or bone lesion. Normal periarticular soft  tissues.    YANE HEWITT MD         SYSTEM ID:  [id number]                     The use of Dragon/[employer] services may have been used to construct the content in this note; any grammatical or spelling errors are non-intentional. Please contact the author of this note directly if you are in need of any clarification.

## 2021-09-11 NOTE — PROGRESS NOTES
"ASSESSMENT & PLAN    Treasure was seen today for pain.    Diagnoses and all orders for this visit:    Effusion of right knee    Acute pain of right knee      This issue is acute and Unchanged.    We discussed these other possible diagnosis: mild arthritis, meniscal tear    Plan:  - Today's Plan of Care:  Continue with relative rest and activity modification, Ice, Compression, and Elevation.  Can apply ice 10-15 minutes 3-4 times per day as needed. OTC medications as needed.  Continue compression  Home Exercise Program - range of motion, quad sets, straight leg raises    -We also discussed other future treatment options:  Consideration of procedure (US guided aspiration and injection)  MRI Right Knee  Formal Physical Therapy    Follow Up: 6 - 8 weeks and as needed    Concerning signs and symptoms were reviewed.  The patient expressed understanding of this management plan and all questions were answered at this time.    Misti Schreiber MD [facility]  Sports Medicine Physician  [facility]      -----  Chief Complaint   Patient presents with     Right Knee - Pain       SUBJECTIVE  Treasure Pradhan is a/an 45 year old female who is seen as a self referral for evaluation of right knee pain.     The patient is seen by themselves.    Onset: 1 month(s) ago. Reports insidious onset without acute precipitating event.  Location of Pain: right knee, \"inside the knee\" inferior patella, \"all over\"  Worsened by: extension, flexion, standing, stepping to the side, step and rotation (pharmacist)  Better with: not much, sometimes ice  Treatments tried: rest/activity avoidance, ice, ibuprofen, previous imaging (xray) and casting/splinting/bracing  Associated symptoms: swelling, weakness of knee, locking or catching and feeling of instability    Orthopedic/Surgical history: YES - high school age, she subluxed the patella  Social History/Occupation: pharmacist    No family history pertinent to patient's problem today.    REVIEW " "OF SYSTEMS:  Review of Systems  Skin: no bruising, mild swelling  Musculoskeletal: as above  Neurologic: no numbness, paresthesias  Remainder of review of systems is negative including constitutional, CV, pulmonary, GI, except as noted in HPI or medical history.    OBJECTIVE:  /87   Ht 1.727 m (5' 8\")   BMI 37.25 kg/m     General: healthy, alert and in no distress  HEENT: no scleral icterus or conjunctival erythema  Skin: no suspicious lesions or rash. No jaundice.  CV: distal perfusion intact  Resp: normal respiratory effort without conversational dyspnea   Psych: normal mood and affect  Gait: normal steady gait with appropriate coordination and balance  Neuro: Normal light sensory exam of lower extremity    Bilateral Knee exam    Inspection:      moderate effusion right    Patella:      Crepitus noted in the patellofemoral joint bilateral    Tender:      medial patellar border right       medial joint line right    Non Tender:      remainder of knee area right    Knee ROM:      Range of motion limited slightly in full flexion on the right    Strength:      5/5 with knee extension right    Special Tests:     Mild pain with Camila right       neg (-) anterior drawer right       neg (-) posterior drawer right       neg (-) varus at 0 deg and 30 deg right       neg (-) valgus at 0 deg and 30 deg right    Gait:      normal    Neurovascular:      2+ peripheral pulses bilaterally and brisk capillary refill       sensation grossly intact    RADIOLOGY:  I independently visualized and reviewed these images with the patient  3 XR views of right knee reviewed: no acute bony abnormality, mild medial compartment degenerative change      Review of the result(s) of each unique test - XR       "

## 2021-09-13 ENCOUNTER — OFFICE VISIT (OUTPATIENT)
Dept: ORTHOPEDICS | Facility: CLINIC | Age: 46
End: 2021-09-13
Payer: COMMERCIAL

## 2021-09-13 VITALS — BODY MASS INDEX: 37.25 KG/M2 | HEIGHT: 68 IN | DIASTOLIC BLOOD PRESSURE: 87 MMHG | SYSTOLIC BLOOD PRESSURE: 128 MMHG

## 2021-09-13 DIAGNOSIS — M25.561 ACUTE PAIN OF RIGHT KNEE: ICD-10-CM

## 2021-09-13 DIAGNOSIS — M25.461 EFFUSION OF RIGHT KNEE: Primary | ICD-10-CM

## 2021-09-13 PROCEDURE — 99203 OFFICE O/P NEW LOW 30 MIN: CPT | Performed by: PEDIATRICS

## 2021-09-13 NOTE — PATIENT INSTRUCTIONS
We discussed these other possible diagnosis: mild arthritis, meniscal tear    Plan:  - Today's Plan of Care:  Continue with relative rest and activity modification, Ice, Compression, and Elevation.  Can apply ice 10-15 minutes 3-4 times per day as needed. OTC medications as needed.  Continue compression  Home Exercise Program - range of motion, quad sets, straight leg raises    -We also discussed other future treatment options:  Consideration of procedure (US guided aspiration and injection)  MRI Right Knee  Formal Physical Therapy    Follow Up: 6 - 8 weeks and as needed    If you have any further questions for your physician or physician s care team you can call [phone] and use option 3 to leave a voice message. Calls received during business hours will be returned same day.

## 2021-09-13 NOTE — LETTER
"    9/13/2021         RE: Treasure Pradhan  [address]  [place] [address]        Dear Colleague,    Thank you for referring your patient, Treasure Pradhan, to the [facility]. Please see a copy of my visit note below.    ASSESSMENT & PLAN    Treasure was seen today for pain.    Diagnoses and all orders for this visit:    Effusion of right knee    Acute pain of right knee      This issue is acute and Unchanged.    We discussed these other possible diagnosis: mild arthritis, meniscal tear    Plan:  - Today's Plan of Care:  Continue with relative rest and activity modification, Ice, Compression, and Elevation.  Can apply ice 10-15 minutes 3-4 times per day as needed. OTC medications as needed.  Continue compression  Home Exercise Program - range of motion, quad sets, straight leg raises    -We also discussed other future treatment options:  Consideration of procedure (US guided aspiration and injection)  MRI Right Knee  Formal Physical Therapy    Follow Up: 6 - 8 weeks and as needed    Concerning signs and symptoms were reviewed.  The patient expressed understanding of this management plan and all questions were answered at this time.    Misti Schreiber MD [facility]  Sports Medicine Physician  [facility]      -----  Chief Complaint   Patient presents with     Right Knee - Pain       SUBJECTIVE  Treasure Pradhan is a/an 45 year old female who is seen as a self referral for evaluation of right knee pain.     The patient is seen by themselves.    Onset: 1 month(s) ago. Reports insidious onset without acute precipitating event.  Location of Pain: right knee, \"inside the knee\" inferior patella, \"all over\"  Worsened by: extension, flexion, standing, stepping to the side, step and rotation (pharmacist)  Better with: not much, sometimes ice  Treatments tried: rest/activity avoidance, ice, ibuprofen, previous imaging (xray) and casting/splinting/bracing  Associated symptoms: " "swelling, weakness of knee, locking or catching and feeling of instability    Orthopedic/Surgical history: YES - high school age, she subluxed the patella  Social History/Occupation: pharmacist    No family history pertinent to patient's problem today.    REVIEW OF SYSTEMS:  Review of Systems  Skin: no bruising, mild swelling  Musculoskeletal: as above  Neurologic: no numbness, paresthesias  Remainder of review of systems is negative including constitutional, CV, pulmonary, GI, except as noted in HPI or medical history.    OBJECTIVE:  /87   Ht 1.727 m (5' 8\")   BMI 37.25 kg/m     General: healthy, alert and in no distress  HEENT: no scleral icterus or conjunctival erythema  Skin: no suspicious lesions or rash. No jaundice.  CV: distal perfusion intact  Resp: normal respiratory effort without conversational dyspnea   Psych: normal mood and affect  Gait: normal steady gait with appropriate coordination and balance  Neuro: Normal light sensory exam of lower extremity    Bilateral Knee exam    Inspection:      moderate effusion right    Patella:      Crepitus noted in the patellofemoral joint bilateral    Tender:      medial patellar border right       medial joint line right    Non Tender:      remainder of knee area right    Knee ROM:      Range of motion limited slightly in full flexion on the right    Strength:      5/5 with knee extension right    Special Tests:     Mild pain with Camila right       neg (-) anterior drawer right       neg (-) posterior drawer right       neg (-) varus at 0 deg and 30 deg right       neg (-) valgus at 0 deg and 30 deg right    Gait:      normal    Neurovascular:      2+ peripheral pulses bilaterally and brisk capillary refill       sensation grossly intact    RADIOLOGY:  I independently visualized and reviewed these images with the patient  3 XR views of right knee reviewed: no acute bony abnormality, mild medial compartment degenerative change      Review of the result(s) " of each unique test - XR           Again, thank you for allowing me to participate in the care of your patient.        Sincerely,        Misti Schreiber MD

## 2021-09-22 ASSESSMENT — ENCOUNTER SYMPTOMS
SHORTNESS OF BREATH: 0
HEARTBURN: 0
PALPITATIONS: 0
EYE PAIN: 0
NAUSEA: 0
FEVER: 0
CONSTIPATION: 1
WEAKNESS: 0
HEMATOCHEZIA: 0
DIZZINESS: 0
NERVOUS/ANXIOUS: 1
JOINT SWELLING: 1
HEADACHES: 0
MYALGIAS: 1
HEMATURIA: 0
DIARRHEA: 1
ABDOMINAL PAIN: 0
BREAST MASS: 0
COUGH: 0
DYSURIA: 0
CHILLS: 0
FREQUENCY: 0
ARTHRALGIAS: 1
SORE THROAT: 0
PARESTHESIAS: 0

## 2021-09-25 ENCOUNTER — HEALTH MAINTENANCE LETTER (OUTPATIENT)
Age: 46
End: 2021-09-25

## 2021-09-28 ASSESSMENT — ENCOUNTER SYMPTOMS
PARESTHESIAS: 0
WEAKNESS: 0
FEVER: 0
NERVOUS/ANXIOUS: 1
DIZZINESS: 0
DYSURIA: 0
SHORTNESS OF BREATH: 0
EYE PAIN: 0
DIARRHEA: 1
FREQUENCY: 0
MYALGIAS: 1
CONSTIPATION: 1
ABDOMINAL PAIN: 0
HEMATURIA: 0
HEARTBURN: 0
PALPITATIONS: 0
JOINT SWELLING: 1
CHILLS: 0
ARTHRALGIAS: 1
HEADACHES: 0
NAUSEA: 0
SORE THROAT: 0
HEMATOCHEZIA: 0
BREAST MASS: 0
COUGH: 0

## 2021-09-28 NOTE — PROGRESS NOTES
SUBJECTIVE:   CC: Treasure Pradhan is an 45 year old woman who presents for preventive health visit.   Patient is here for annual physical and with other concerns as mentioned below    Recurrent cold sores-patient reported having shallow ulcerative lesions on the left corner of the lower lip intermittently for the past few months, since she started wearing mask associated with sweating.  Has tried over-the-counter Abreva topical cream but with no complete dissolution of symptoms  She had one episode last week towards the end of resolution, she started having another lesion on the same location  Denies itching, bleeding, discharge  Chronic right foot pain complaining of pain in the right metatarsal area intermittently for the past 6+ months.  Had history of perineuroma excision on the right foot in 2006 and patient is wondering if this pain is related to the previous surgery or previous similar condition  She denies tingling, numbness or weakness of the foot or the toes, foot swelling  She is also having right knee pain with possible partial tear of the medial meniscus, currently seeing sports medicine    Patient has been advised of split billing requirements and indicates understanding: Yes  Healthy Habits:     Getting at least 3 servings of Calcium per day:  Yes    Bi-annual eye exam:  NO    Dental care twice a year:  NO    Sleep apnea or symptoms of sleep apnea:  Daytime drowsiness    Diet:  Other    Frequency of exercise:  2-3 days/week    Duration of exercise:  15-30 minutes    Taking medications regularly:  No    Medication side effects:  None    PHQ-2 Total Score: 2    Additional concerns today:  Yes          Depression and Anxiety Follow-Up    How are you doing with your depression since your last visit? No change    How are you doing with your anxiety since your last visit?  No change    Are you having other symptoms that might be associated with depression or anxiety? No    Have you had a significant  life event? No     Do you have any concerns with your use of alcohol or other drugs? No    Social History     Tobacco Use     Smoking status: Never Smoker     Smokeless tobacco: Never Used   Substance Use Topics     Alcohol use: Yes     Comment: 3-4 drinks per month     Drug use: No     PHQ 3/22/2021 6/7/2021 9/29/2021   PHQ-9 Total Score 12 11 9   Q9: Thoughts of better off dead/self-harm past 2 weeks Not at all Not at all Not at all     NATANAEL-7 SCORE 2/5/2020 9/22/2020 9/29/2021   Total Score - - -   Total Score - - 8 (mild anxiety)   Total Score 5 9 8     Last PHQ-9 9/29/2021   1.  Little interest or pleasure in doing things 2   2.  Feeling down, depressed, or hopeless 2   3.  Trouble falling or staying asleep, or sleeping too much 1   4.  Feeling tired or having little energy 1   5.  Poor appetite or overeating 1   6.  Feeling bad about yourself 0   7.  Trouble concentrating 1   8.  Moving slowly or restless 1   Q9: Thoughts of better off dead/self-harm past 2 weeks 0   PHQ-9 Total Score 9   Difficulty at work, home, or with people -     NATANAEL-7  9/29/2021   1. Feeling nervous, anxious, or on edge 2   2. Not being able to stop or control worrying 1   3. Worrying too much about different things 1   4. Trouble relaxing 1   5. Being so restless that it is hard to sit still 1   6. Becoming easily annoyed or irritable 1   7. Feeling afraid, as if something awful might happen 1   NATANAEL-7 Total Score 8   If you checked any problems, how difficult have they made it for you to do your work, take care of things at home, or get along with other people? -       Suicide Assessment Five-step Evaluation and Treatment (SAFE-T)      Today's PHQ-2 Score:   PHQ-2 ( 1999 [employer]) 9/22/2021   Q1: Little interest or pleasure in doing things 1   Q2: Feeling down, depressed or hopeless 1   PHQ-2 Score 2   Q1: Little interest or pleasure in doing things Several days   Q2: Feeling down, depressed or hopeless Several days   PHQ-2 Score 2        Abuse: Current or Past (Physical, Sexual or Emotional) - No  Do you feel safe in your environment? Yes    Have you ever done Advance Care Planning? (For example, a Health Directive, POLST, or a discussion with a medical provider or your loved ones about your wishes): no    Social History     Tobacco Use     Smoking status: Never Smoker     Smokeless tobacco: Never Used   Substance Use Topics     Alcohol use: Yes     Comment: 3-4 drinks per month     If you drink alcohol do you typically have >3 drinks per day or >7 drinks per week? No    Alcohol Use 9/22/2021   Prescreen: >3 drinks/day or >7 drinks/week? No   Prescreen: >3 drinks/day or >7 drinks/week? -   No flowsheet data found.    Reviewed orders with patient.  Reviewed health maintenance and updated orders accordingly - Yes  Lab work is in process  Labs reviewed in EPIC  BP Readings from Last 3 Encounters:   09/29/21 117/78   09/13/21 128/87   08/25/21 121/83    Wt Readings from Last 3 Encounters:   09/29/21 107.8 kg (237 lb 9 oz)   08/25/21 111.1 kg (245 lb)   03/22/21 107.5 kg (237 lb 1.6 oz)                  Patient Active Problem List   Diagnosis     Migraine     Anxiety     Mild intermittent asthma without complication     Anal pain     Back pain     Moderate recurrent major depression (H)     Generalized anxiety disorder     Dyslipidemia     Onychomycosis     Therapeutic drug monitoring     Degeneration of thoracic or thoracolumbar intervertebral disc     DDD (degenerative disc disease), cervical     Migraine without aura and without status migrainosus, not intractable     Encounter for surveillance of other contraceptive     Pigmented skin lesion, midback, 3mm     Chronic allergic rhinitis     Skin lesion, left thigh     Chronic right hip pain     Benign essential hypertension     Obesity (BMI 35.0-39.9) with comorbidity (H)     Back muscle spasm     Recurrent cold sores     Chronic foot pain, right     S/P foot surgery, right     Past Surgical  History:   Procedure Laterality Date     SURGICAL HISTORY OF -   2007         SURGICAL HISTORY OF -   3/2006    Rt Foot mass - Perineurioma       Social History     Tobacco Use     Smoking status: Never Smoker     Smokeless tobacco: Never Used   Substance Use Topics     Alcohol use: Yes     Comment: 3-4 drinks per month     Family History   Problem Relation Age of Onset     Heart Disease Father      Prostate Cancer Father      Diabetes Maternal Grandfather      Heart Disease Paternal Grandfather      Genetic Disorder Daughter         At Birth/ [place]         Current Outpatient Medications   Medication Sig Dispense Refill     albuterol (PROAIR HFA) 108 (90 Base) MCG/ACT inhaler Inhale 2 puffs into the lungs every 6 hours as needed for shortness of breath / dyspnea or wheezing Profile Rx 18 g 1     buPROPion (WELLBUTRIN XL) 150 MG 24 hr tablet Take 2 tablets (300 mg) by mouth every morning Change in dose 180 tablet 1     citalopram (CELEXA) 20 MG tablet Take 1 tablet (20 mg) by mouth every evening 90 tablet 2     etonogestrel-ethinyl estradiol (NUVARING) 0.12-0.015 MG/24HR vaginal ring PLACE 1 RING VAGINALLY EVERY 21 DAYS THEN REMOVE FOR 1 WEEK AND REPEAT 3 each 4     lisinopril (ZESTRIL) 20 MG tablet Take 0.5 tablets (10 mg) by mouth daily 45 tablet 3     mometasone (NASONEX) 50 MCG/ACT nasal spray Spray 2 sprays into both nostrils daily as needed (Nasal allergies) Profile prescription 17 g 6     rizatriptan (MAXALT-MLT) 10 MG ODT Take 1 tablet (10 mg) by mouth See Admin Instructions WITH ONSET OF MIGRAINE, MAY REPEAT ONCE AFTER 2 HOURS. DO NOT EXCEED 3 TABLETS IN 24 HOURS. 6 tablet 6     valACYclovir (VALTREX) 500 MG tablet Take 1 tablet (500 mg) by mouth 2 times daily for 3 days 6 tablet 4     aspirin-acetaminophen-caffeine (EXCEDRIN MIGRAINE) 250-250-65 MG tablet Take 1-2 tablets by mouth every 6 hours as needed for headaches       cyclobenzaprine (FLEXERIL) 10 MG tablet Take 1 tablet (10 mg) by  mouth 2 times daily as needed for muscle spasms 30 tablet 1     diclofenac (VOLTAREN) 1 % topical gel Place onto the skin 4 times daily (Patient taking differently: Place onto the skin 4 times daily as needed ) 100 g 1     docusate sodium (COLACE) 50 MG capsule Take 50 mg by mouth 2 times daily as needed  (Patient not taking: Reported on 8/25/2021)       fexofenadine (ALLEGRA) 180 MG tablet Take 180 mg by mouth daily       ibuprofen (ADVIL/MOTRIN) 200 MG tablet Take 200-400 mg by mouth every 4 hours as needed for mild pain       MAGNESIUM PO Take 1 tablet by mouth daily       melatonin 5 MG tablet Take 5 mg by mouth At Bedtime       Multiple Vitamins-Minerals (MULTIVITAMIN ADULT PO) Take 1 tablet by mouth daily       nystatin (MYCOSTATIN) 752529 UNIT/GM POWD Use to squirt inside the socks once daily (Patient taking differently: Use to squirt inside the socks once daily PRN) 60 g 3     Allergies   Allergen Reactions     Terbinafine Itching     Recent Labs   Lab Test 03/22/21  0719 02/05/20  1717 11/20/19  1733 11/20/19  1733 03/07/19  0944 03/03/14  1004   *  --   --   --  126* 146*   HDL 51  --   --   --  62 46*   TRIG 174*  --   --   --  102 96   ALT 29  --   --   --   --  33   CR 0.88 0.79   < > 0.69  --   --    GFRESTIMATED 80 >90   < > >90  --   --    GFRESTBLACK >90 >90   < > >90  --   --    POTASSIUM 4.0 4.1   < > 3.8  --   --    TSH  --   --   --  0.94  --   --     < > = values in this interval not displayed.        Breast Cancer Screening:    FHS-7:   Breast CA Risk Assessment (FHS-7) 9/22/2021   Did any of your first-degree relatives have breast or ovarian cancer? No   Did any of your relatives have bilateral breast cancer? Yes   Did any man in your family have breast cancer? No   Did any woman in your family have breast and ovarian cancer? Yes   Did any woman in your family have breast cancer before age 50 y? Yes   Do you have 2 or more relatives with breast and/or ovarian cancer? No   Do you have 2  or more relatives with breast and/or bowel cancer? No     click delete button to remove this line now  Mammogram Screening - Offered annual screening and updated Health Maintenance for mutual plan based on risk factor consideration    Pertinent mammograms are reviewed under the imaging tab.    History of abnormal Pap smear: NO - age 30-65 PAP every 5 years with negative HPV co-testing recommended  PAP / HPV Latest Ref Rng & Units 2018 6/3/2015 2012   PAP (Historical) - NIL NIL NIL   HPV16 NEG:Negative Negative - -   HPV18 NEG:Negative Negative - -   HRHPV NEG:Negative Negative - -     Reviewed and updated as needed this visit by clinical staff  Tobacco                Reviewed and updated as needed this visit by Provider                  Past Medical History:   Diagnosis Date     Allergic rhinitis      Asthma, intermittent      Back pain      Depression with anxiety      Essential hypertension 10/29/2019     Hyperlipidemia LDL goal < 160      Migraine       Past Surgical History:   Procedure Laterality Date     SURGICAL HISTORY OF -   2007         SURGICAL HISTORY OF -   3/2006    Rt Foot mass - Perineurioma     OB History    Para Term  AB Living   2 2 2 0 0 2   SAB TAB Ectopic Multiple Live Births   0 0 0 0 2      # Outcome Date GA Lbr Presley/2nd Weight Sex Delivery Anes PTL Lv   2 Term      -SEC   INDU   1 Term      -SEC   INDU       Review of Systems   Constitutional: Negative for chills and fever.   HENT: Negative for congestion, ear pain, hearing loss and sore throat.    Eyes: Negative for pain and visual disturbance.   Respiratory: Negative for cough and shortness of breath.    Cardiovascular: Positive for peripheral edema. Negative for chest pain and palpitations.   Gastrointestinal: Positive for constipation and diarrhea. Negative for abdominal pain, heartburn, hematochezia and nausea.   Breasts:  Positive for tenderness. Negative for breast mass and  "discharge.   Genitourinary: Negative for dysuria, frequency, genital sores, hematuria, pelvic pain, urgency, vaginal bleeding and vaginal discharge.   Musculoskeletal: Positive for arthralgias, joint swelling and myalgias.   Skin: Negative for rash.   Neurological: Negative for dizziness, weakness, headaches and paresthesias.   Psychiatric/Behavioral: Positive for mood changes. The patient is nervous/anxious.      CONSTITUTIONAL: NEGATIVE for fever, chills, change in weight  INTEGUMENTARU/SKIN: NEGATIVE for worrisome rashes, moles or lesions  EYES: NEGATIVE for vision changes or irritation  ENT: NEGATIVE for ear, mouth and throat problems  ENT: as above  RESP: NEGATIVE for significant cough or SOB  RESP: History of asthma  BREAST: NEGATIVE for masses, tenderness or discharge  CV: NEGATIVE for chest pain, palpitations or peripheral edema  CV: History of hypertension  GI: History of alternating diarrhea and constipation  : NEGATIVE for unusual urinary or vaginal symptoms. Periods are regular.  MUSCULOSKELETAL:as above  NEURO: NEGATIVE for weakness, dizziness or paresthesias  NEURO: History of migraine  ENDOCRINE: NEGATIVE for temperature intolerance, skin/hair changes  HEME/ALLERGY/IMMUNE: NEGATIVE for bleeding problems  PSYCHIATRIC: NEGATIVE for changes in mood or affect  PSYCHIATRIC: History of depression and anxiety needing better control     OBJECTIVE:   /78   Pulse 69   Ht 1.734 m (5' 8.25\")   Wt 107.8 kg (237 lb 9 oz)   SpO2 98%   BMI 35.86 kg/m    Physical Exam  GENERAL: healthy, alert and no distress  EYES: Eyes grossly normal to inspection, PERRL and conjunctivae and sclerae normal  HENT: ear canals and TM's normal, nose and mouth without ulcers or lesions  HENT: Lower lip-shallow ulcerated papular lesion on the left corner of the lower lip  NECK: no adenopathy, no asymmetry, masses, or scars and thyroid normal to palpation  RESP: lungs clear to auscultation - no rales, rhonchi or " wheezes  BREAST: normal without masses, tenderness or nipple discharge and no palpable axillary masses or adenopathy  CV: regular rate and rhythm, normal S1 S2, no S3 or S4, no murmur, click or rub, no peripheral edema and peripheral pulses strong  ABDOMEN: soft, nontender, no hepatosplenomegaly, no masses and bowel sounds normal   (female): normal female external genitalia, normal urethral meatus, vaginal mucosa pink, moist, well rugated, and normal cervix/adnexa/uterus without masses or discharge   (female): Vaginal ring felt  MS: Normal gait  Minimal to moderate tenderness over the second and third metatarsal area, surgical scar  SKIN: no suspicious lesions or rashes  onychomycosis of the right big toenail  NEURO: Normal strength and tone, mentation intact and speech normal  PSYCH: mentation appears normal, affect normal/bright    Diagnostic Test Results:  Labs reviewed in Epic    ASSESSMENT/PLAN:   (Z00.00) Routine general medical examination at a health care facility  (primary encounter diagnosis)  Comment:   Plan: Discussed on regular exercises, daily calcium intake, healthy eating, self breast exams monthly and routine dental checks      (F33.1) Moderate recurrent major depression (H)  Comment:   Plan: buPROPion (WELLBUTRIN XL) 150 MG 24 hr tablet,         citalopram (CELEXA) 20 MG tablet, EMOTIONAL /         BEHAVIORAL ASSESSMENT          PHQ 3/22/2021 6/7/2021 9/29/2021   PHQ-9 Total Score 12 11 9   Q9: Thoughts of better off dead/self-harm past 2 weeks Not at all Not at all Not at all     This needs improvement  Reviewed PHQ-9 questionnaire  In the past we try to increase the dose of Wellbutrin from 150 to 300 mg when patient experienced increased frequency of migraines that prompted her decrease the dose back to 150 mg, the patient reported she felt improving mood symptoms at that time  She is interested in giving a trial again  We will increase the dose of Wellbutrin from 150 to 300 mg, continue with  current dose of Celexa  Recommended to send a MyChart therapeutic response in 4 weeks  If patient feels fine, will continue with this regimen if not, will decrease the dose of Wellbutrin back to 150 mg and consider increasing the dose of Celexa from 20 mg to 30 to 40 mg  Continue with regular exercises, healthy eating  Follow-up in 6 months for virtual recheck visit  Patient verbalised understanding and is agreeable to the plan.      (F41.9) Anxiety  Comment:   Plan: buPROPion (WELLBUTRIN XL) 150 MG 24 hr tablet,         citalopram (CELEXA) 20 MG tablet, EMOTIONAL /         BEHAVIORAL ASSESSMENT        as above  NATANAEL-7 SCORE 2/5/2020 9/22/2020 9/29/2021   Total Score - - -   Total Score - - 8 (mild anxiety)   Total Score 5 9 8           (B00.1) Recurrent cold sores  Comment:   Plan: valACYclovir (VALTREX) 500 MG tablet        Recommended good hydration, sun protection  Prescription given for Valtrex 500 mg to take twice a day for 3 days along with few refills just in case  If patient gets 1-2 episodes every month, will consider starting on Valtrex 500 mg once a day for chronic viral suppression  Dosing and potential medication side effects discussed.  Patient verbalised understanding and is agreeable to the plan.      (M79.671,  G89.29) Chronic foot pain, right  Comment:   Plan: Orthopedic  Referral        Due to chronicity of symptoms and previous history of surgery, recommended to consult podiatry for further evaluation    (Z98.890) S/P foot surgery, right  Comment:   Plan: Orthopedic  Referral        as above      (B35.1) Onychomycosis  Comment:   Plan: Orthopedic  Referral        as above      (E66.01) Morbid obesity (H)  Comment:   Plan:   Wt Readings from Last 5 Encounters:   09/29/21 107.8 kg (237 lb 9 oz)   08/25/21 111.1 kg (245 lb)   03/22/21 107.5 kg (237 lb 1.6 oz)   09/22/20 102.2 kg (225 lb 6.4 oz)   02/05/20 108.7 kg (239 lb 9.6 oz)     Appreciated patient's efforts on weight  loss, continue with portion control, healthy eating and regular exercises, recheck in 6 months    (Z12.31) Encounter for screening mammogram for malignant neoplasm of breast  Comment:   Plan: MA Screening Digital Bilateral            (Z12.4) Cervical cancer screening  Comment:   Plan: Pap screen with HPV - recommended age 30 - 65         years            (Z30.49) Encounter for surveillance of other contraceptive  Comment:   Plan: etonogestrel-ethinyl estradiol (NUVARING)         0.12-0.015 MG/24HR vaginal ring            (I10) Benign essential hypertension  Comment:   Plan: lisinopril (ZESTRIL) 20 MG tablet          BP Readings from Last 6 Encounters:   09/29/21 117/78   09/13/21 128/87   08/25/21 121/83   03/22/21 112/78   09/22/20 123/83   02/05/20 120/82     Blood pressure is at goal, continue with current dose of lisinopril  Will follow low salt diet, weight loss and regular exercises.  Follow for recheck in 6 months along with fasting labs    (J45.20) Mild intermittent asthma without complication  Comment:   Plan: albuterol (PROAIR HFA) 108 (90 Base) MCG/ACT         inhaler        Stable, continue with current inhaler    (J30.9) Chronic allergic rhinitis  Comment:   Plan: mometasone (NASONEX) 50 MCG/ACT nasal spray            (G43.709) Chronic migraine without aura without status migrainosus, not intractable  Comment:   Plan: rizatriptan (MAXALT-MLT) 10 MG ODT        Stable, continue to avoid potential triggers for migraine including triggering foods, Maxalt as needed    (K58.2) Irritable bowel syndrome with both constipation and diarrhea  Comment:   Plan: Recommended to try Metamucil daily, take MiraLAX 1-2 times a week to avoid constipation, keep a food diary and start on internation diet if possible for potential food triggers    Patient has been advised of split billing requirements and indicates understanding: Yes  COUNSELING:  Reviewed preventive health counseling, as reflected in patient  "instructions  Special attention given to:        Regular exercise       Healthy diet/nutrition       Vision screening       Immunizations    Vaccinated for: Influenza             Contraception       Family planning       Folic Acid       Osteoporosis prevention/bone health    Estimated body mass index is 35.86 kg/m  as calculated from the following:    Height as of this encounter: 1.734 m (5' 8.25\").    Weight as of this encounter: 107.8 kg (237 lb 9 oz).    Weight management plan: Discussed healthy diet and exercise guidelines    She reports that she has never smoked. She has never used smokeless tobacco.      Counseling Resources:  ATP IV Guidelines  Pooled Cohorts Equation Calculator  Breast Cancer Risk Calculator  BRCA-Related Cancer Risk Assessment: FHS-7 Tool  FRAX Risk Assessment  ICSI Preventive Guidelines  Dietary Guidelines for Americans, 2010  USDA's MyPlate  ASA Prophylaxis  Lung CA Screening    Nelida Celestin MD  [facility]  Chart documentation done in part with Dragon Voice recognition Software. Although reviewed after completion, some word and grammatical error may remain.    "

## 2021-09-29 ENCOUNTER — OFFICE VISIT (OUTPATIENT)
Dept: FAMILY MEDICINE | Facility: CLINIC | Age: 46
End: 2021-09-29
Payer: COMMERCIAL

## 2021-09-29 VITALS
DIASTOLIC BLOOD PRESSURE: 78 MMHG | SYSTOLIC BLOOD PRESSURE: 117 MMHG | BODY MASS INDEX: 36.01 KG/M2 | HEIGHT: 68 IN | OXYGEN SATURATION: 98 % | WEIGHT: 237.56 LBS | HEART RATE: 69 BPM

## 2021-09-29 DIAGNOSIS — M79.671 CHRONIC FOOT PAIN, RIGHT: ICD-10-CM

## 2021-09-29 DIAGNOSIS — F33.1 MODERATE RECURRENT MAJOR DEPRESSION (H): ICD-10-CM

## 2021-09-29 DIAGNOSIS — Z12.4 CERVICAL CANCER SCREENING: ICD-10-CM

## 2021-09-29 DIAGNOSIS — Z00.00 ROUTINE GENERAL MEDICAL EXAMINATION AT A HEALTH CARE FACILITY: Primary | ICD-10-CM

## 2021-09-29 DIAGNOSIS — Z12.31 ENCOUNTER FOR SCREENING MAMMOGRAM FOR MALIGNANT NEOPLASM OF BREAST: ICD-10-CM

## 2021-09-29 DIAGNOSIS — Z30.49 ENCOUNTER FOR SURVEILLANCE OF OTHER CONTRACEPTIVE: ICD-10-CM

## 2021-09-29 DIAGNOSIS — I10 BENIGN ESSENTIAL HYPERTENSION: ICD-10-CM

## 2021-09-29 DIAGNOSIS — Z98.890 S/P FOOT SURGERY, RIGHT: ICD-10-CM

## 2021-09-29 DIAGNOSIS — G43.709 CHRONIC MIGRAINE WITHOUT AURA WITHOUT STATUS MIGRAINOSUS, NOT INTRACTABLE: ICD-10-CM

## 2021-09-29 DIAGNOSIS — F41.9 ANXIETY: ICD-10-CM

## 2021-09-29 DIAGNOSIS — G89.29 CHRONIC FOOT PAIN, RIGHT: ICD-10-CM

## 2021-09-29 DIAGNOSIS — E66.01 MORBID OBESITY (H): ICD-10-CM

## 2021-09-29 DIAGNOSIS — K58.2 IRRITABLE BOWEL SYNDROME WITH BOTH CONSTIPATION AND DIARRHEA: ICD-10-CM

## 2021-09-29 DIAGNOSIS — J45.20 MILD INTERMITTENT ASTHMA WITHOUT COMPLICATION: ICD-10-CM

## 2021-09-29 DIAGNOSIS — B00.1 RECURRENT COLD SORES: ICD-10-CM

## 2021-09-29 DIAGNOSIS — J30.9 CHRONIC ALLERGIC RHINITIS: ICD-10-CM

## 2021-09-29 DIAGNOSIS — B35.1 ONYCHOMYCOSIS: ICD-10-CM

## 2021-09-29 PROCEDURE — 99214 OFFICE O/P EST MOD 30 MIN: CPT | Mod: 25 | Performed by: FAMILY MEDICINE

## 2021-09-29 PROCEDURE — 87624 HPV HI-RISK TYP POOLED RSLT: CPT | Performed by: FAMILY MEDICINE

## 2021-09-29 PROCEDURE — 96127 BRIEF EMOTIONAL/BEHAV ASSMT: CPT | Performed by: FAMILY MEDICINE

## 2021-09-29 PROCEDURE — 90686 IIV4 VACC NO PRSV 0.5 ML IM: CPT | Performed by: FAMILY MEDICINE

## 2021-09-29 PROCEDURE — G0145 SCR C/V CYTO,THINLAYER,RESCR: HCPCS | Performed by: FAMILY MEDICINE

## 2021-09-29 PROCEDURE — 99396 PREV VISIT EST AGE 40-64: CPT | Mod: 25 | Performed by: FAMILY MEDICINE

## 2021-09-29 PROCEDURE — 90471 IMMUNIZATION ADMIN: CPT | Performed by: FAMILY MEDICINE

## 2021-09-29 RX ORDER — ETONOGESTREL AND ETHINYL ESTRADIOL VAGINAL RING .015; .12 MG/D; MG/D
RING VAGINAL
Qty: 3 EACH | Refills: 4 | Status: SHIPPED | OUTPATIENT
Start: 2021-09-29 | End: 2022-09-14

## 2021-09-29 RX ORDER — CITALOPRAM HYDROBROMIDE 20 MG/1
20 TABLET ORAL EVERY EVENING
Qty: 90 TABLET | Refills: 2 | Status: SHIPPED | OUTPATIENT
Start: 2021-09-29 | End: 2022-04-11

## 2021-09-29 RX ORDER — RIZATRIPTAN BENZOATE 10 MG/1
10 TABLET, ORALLY DISINTEGRATING ORAL SEE ADMIN INSTRUCTIONS
Qty: 6 TABLET | Refills: 6 | Status: SHIPPED | OUTPATIENT
Start: 2021-09-29 | End: 2022-04-11

## 2021-09-29 RX ORDER — ALBUTEROL SULFATE 90 UG/1
2 AEROSOL, METERED RESPIRATORY (INHALATION) EVERY 6 HOURS PRN
Qty: 18 G | Refills: 1 | Status: SHIPPED | OUTPATIENT
Start: 2021-09-29 | End: 2022-09-14

## 2021-09-29 RX ORDER — BUPROPION HYDROCHLORIDE 150 MG/1
300 TABLET ORAL EVERY MORNING
Qty: 180 TABLET | Refills: 1 | Status: SHIPPED | OUTPATIENT
Start: 2021-09-29 | End: 2022-04-11

## 2021-09-29 RX ORDER — MOMETASONE FUROATE MONOHYDRATE 50 UG/1
2 SPRAY, METERED NASAL DAILY PRN
Qty: 17 G | Refills: 6 | Status: SHIPPED | OUTPATIENT
Start: 2021-09-29 | End: 2022-09-14

## 2021-09-29 RX ORDER — LISINOPRIL 20 MG/1
10 TABLET ORAL DAILY
Qty: 45 TABLET | Refills: 3 | Status: SHIPPED | OUTPATIENT
Start: 2021-09-29 | End: 2022-04-11

## 2021-09-29 RX ORDER — VALACYCLOVIR HYDROCHLORIDE 500 MG/1
500 TABLET, FILM COATED ORAL 2 TIMES DAILY
Qty: 6 TABLET | Refills: 4 | Status: SHIPPED | OUTPATIENT
Start: 2021-09-29 | End: 2022-07-01

## 2021-09-29 ASSESSMENT — ANXIETY QUESTIONNAIRES
GAD7 TOTAL SCORE: 8
7. FEELING AFRAID AS IF SOMETHING AWFUL MIGHT HAPPEN: SEVERAL DAYS
2. NOT BEING ABLE TO STOP OR CONTROL WORRYING: SEVERAL DAYS
7. FEELING AFRAID AS IF SOMETHING AWFUL MIGHT HAPPEN: SEVERAL DAYS
8. IF YOU CHECKED OFF ANY PROBLEMS, HOW DIFFICULT HAVE THESE MADE IT FOR YOU TO DO YOUR WORK, TAKE CARE OF THINGS AT HOME, OR GET ALONG WITH OTHER PEOPLE?: SOMEWHAT DIFFICULT
1. FEELING NERVOUS, ANXIOUS, OR ON EDGE: MORE THAN HALF THE DAYS
6. BECOMING EASILY ANNOYED OR IRRITABLE: SEVERAL DAYS
3. WORRYING TOO MUCH ABOUT DIFFERENT THINGS: SEVERAL DAYS
GAD7 TOTAL SCORE: 8
4. TROUBLE RELAXING: SEVERAL DAYS
GAD7 TOTAL SCORE: 8
5. BEING SO RESTLESS THAT IT IS HARD TO SIT STILL: SEVERAL DAYS

## 2021-09-29 ASSESSMENT — PATIENT HEALTH QUESTIONNAIRE - PHQ9
10. IF YOU CHECKED OFF ANY PROBLEMS, HOW DIFFICULT HAVE THESE PROBLEMS MADE IT FOR YOU TO DO YOUR WORK, TAKE CARE OF THINGS AT HOME, OR GET ALONG WITH OTHER PEOPLE: SOMEWHAT DIFFICULT
SUM OF ALL RESPONSES TO PHQ QUESTIONS 1-9: 9
SUM OF ALL RESPONSES TO PHQ QUESTIONS 1-9: 9

## 2021-09-29 ASSESSMENT — MIFFLIN-ST. JEOR: SCORE: 1780.05

## 2021-09-30 ASSESSMENT — ANXIETY QUESTIONNAIRES: GAD7 TOTAL SCORE: 8

## 2021-09-30 ASSESSMENT — ASTHMA QUESTIONNAIRES: ACT_TOTALSCORE: 23

## 2021-09-30 ASSESSMENT — PATIENT HEALTH QUESTIONNAIRE - PHQ9: SUM OF ALL RESPONSES TO PHQ QUESTIONS 1-9: 9

## 2021-10-01 LAB
BKR LAB AP GYN ADEQUACY: NORMAL
BKR LAB AP GYN INTERPRETATION: NORMAL
BKR LAB AP HPV REFLEX: NORMAL
BKR LAB AP PREVIOUS ABNORMAL: NORMAL
PATH REPORT.COMMENTS IMP SPEC: NORMAL
PATH REPORT.RELEVANT HX SPEC: NORMAL

## 2021-10-06 LAB
HUMAN PAPILLOMA VIRUS 16 DNA: NEGATIVE
HUMAN PAPILLOMA VIRUS 18 DNA: NEGATIVE
HUMAN PAPILLOMA VIRUS FINAL DIAGNOSIS: NORMAL
HUMAN PAPILLOMA VIRUS OTHER HR: NEGATIVE

## 2021-10-21 ENCOUNTER — OFFICE VISIT (OUTPATIENT)
Dept: PODIATRY | Facility: CLINIC | Age: 46
End: 2021-10-21
Payer: COMMERCIAL

## 2021-10-21 VITALS — HEART RATE: 78 BPM | DIASTOLIC BLOOD PRESSURE: 80 MMHG | SYSTOLIC BLOOD PRESSURE: 126 MMHG

## 2021-10-21 DIAGNOSIS — L03.031 INFECTION OF NAIL BED OF TOE OF RIGHT FOOT: Primary | ICD-10-CM

## 2021-10-21 DIAGNOSIS — B35.1 ONYCHOMYCOSIS: ICD-10-CM

## 2021-10-21 LAB
ALT SERPL W P-5'-P-CCNC: 39 U/L (ref 0–50)
AST SERPL W P-5'-P-CCNC: 23 U/L (ref 0–45)

## 2021-10-21 PROCEDURE — 36415 COLL VENOUS BLD VENIPUNCTURE: CPT | Performed by: PODIATRIST

## 2021-10-21 PROCEDURE — 87186 SC STD MICRODIL/AGAR DIL: CPT | Performed by: PODIATRIST

## 2021-10-21 PROCEDURE — 99204 OFFICE O/P NEW MOD 45 MIN: CPT | Performed by: PODIATRIST

## 2021-10-21 PROCEDURE — 87077 CULTURE AEROBIC IDENTIFY: CPT | Performed by: PODIATRIST

## 2021-10-21 PROCEDURE — 84460 ALANINE AMINO (ALT) (SGPT): CPT | Performed by: PODIATRIST

## 2021-10-21 PROCEDURE — 84450 TRANSFERASE (AST) (SGOT): CPT | Performed by: PODIATRIST

## 2021-10-21 PROCEDURE — 87070 CULTURE OTHR SPECIMN AEROBIC: CPT | Performed by: PODIATRIST

## 2021-10-21 RX ORDER — FLUCONAZOLE 150 MG/1
300 TABLET ORAL
Qty: 8 TABLET | Refills: 2 | Status: SHIPPED | OUTPATIENT
Start: 2021-10-21

## 2021-10-21 NOTE — LETTER
10/21/2021         RE: Treasure Pradhan  [address]  [facility] [address]        Dear Colleague,    Thank you for referring your patient, Treasure Pradhan, to the [facility]. Please see a copy of my visit note below.    S:  Patient seen today in consult from Dr. Celestin and complains of right foot pain.  Points to plantar second metatarsal head.  Describes as a burning pain.  aggravated by activity and relieved by rest.  Has had this for 6 months.  No pain anywhere else on feet.  Goes barefoot around house.  Works at standing job.  Denies erythema, edema, weakness, numbness.  History of neuroma removal and right second interspace in the past that did not help much.  Also notices thick first and third nails.  She would like to have these treated.  Has been on oral Lamisil in the past but caused itching.    ROS: See above       Allergies   Allergen Reactions     Terbinafine Itching       Current Outpatient Medications   Medication Sig Dispense Refill     fluconazole (DIFLUCAN) 150 MG tablet Take 2 tablets (300 mg) by mouth every 7 days 8 tablet 2     albuterol (PROAIR HFA) 108 (90 Base) MCG/ACT inhaler Inhale 2 puffs into the lungs every 6 hours as needed for shortness of breath / dyspnea or wheezing Profile Rx 18 g 1     buPROPion (WELLBUTRIN XL) 150 MG 24 hr tablet Take 2 tablets (300 mg) by mouth every morning Change in dose 180 tablet 1     citalopram (CELEXA) 20 MG tablet Take 1 tablet (20 mg) by mouth every evening 90 tablet 2     cyclobenzaprine (FLEXERIL) 10 MG tablet Take 1 tablet (10 mg) by mouth 2 times daily as needed for muscle spasms 30 tablet 1     diclofenac (VOLTAREN) 1 % topical gel Place onto the skin 4 times daily (Patient taking differently: Place onto the skin 4 times daily as needed ) 100 g 1     docusate sodium (COLACE) 50 MG capsule Take 50 mg by mouth 2 times daily as needed  (Patient not taking: Reported on 8/25/2021)       etonogestrel-ethinyl estradiol  (NUVARING) 0.12-0.015 MG/24HR vaginal ring PLACE 1 RING VAGINALLY EVERY 21 DAYS THEN REMOVE FOR 1 WEEK AND REPEAT 3 each 4     fexofenadine (ALLEGRA) 180 MG tablet Take 180 mg by mouth daily       ibuprofen (ADVIL/MOTRIN) 200 MG tablet Take 200-400 mg by mouth every 4 hours as needed for mild pain       lisinopril (ZESTRIL) 20 MG tablet Take 0.5 tablets (10 mg) by mouth daily 45 tablet 3     MAGNESIUM PO Take 1 tablet by mouth daily       melatonin 5 MG tablet Take 5 mg by mouth At Bedtime       mometasone (NASONEX) 50 MCG/ACT nasal spray Spray 2 sprays into both nostrils daily as needed (Nasal allergies) Profile prescription 17 g 6     Multiple Vitamins-Minerals (MULTIVITAMIN ADULT PO) Take 1 tablet by mouth daily       nystatin (MYCOSTATIN) 407886 UNIT/GM POWD Use to squirt inside the socks once daily (Patient taking differently: Use to squirt inside the socks once daily PRN) 60 g 3     rizatriptan (MAXALT-MLT) 10 MG ODT Take 1 tablet (10 mg) by mouth See Admin Instructions WITH ONSET OF MIGRAINE, MAY REPEAT ONCE AFTER 2 HOURS. DO NOT EXCEED 3 TABLETS IN 24 HOURS. 6 tablet 6     valACYclovir (VALTREX) 500 MG tablet Take 1 tablet (500 mg) by mouth 2 times daily for 3 days 6 tablet 4       Patient Active Problem List   Diagnosis     Migraine     Anxiety     Mild intermittent asthma without complication     Back pain     Moderate recurrent major depression (H)     Generalized anxiety disorder     Dyslipidemia     Onychomycosis     Therapeutic drug monitoring     Degeneration of thoracic or thoracolumbar intervertebral disc     DDD (degenerative disc disease), cervical     Migraine without aura and without status migrainosus, not intractable     Encounter for surveillance of other contraceptive     Pigmented skin lesion, midback, 3mm     Chronic allergic rhinitis     Skin lesion, left thigh     Chronic right hip pain     Benign essential hypertension     Obesity (BMI 35.0-39.9) with comorbidity (H)     Back muscle  spasm     Recurrent cold sores     Chronic foot pain, right     S/P foot surgery, right     Irritable bowel syndrome with both constipation and diarrhea     Previous  delivery, delivered       Past Medical History:   Diagnosis Date     Allergic rhinitis      Asthma, intermittent      Back pain      Depression with anxiety      Essential hypertension 10/29/2019     Hyperlipidemia LDL goal < 160      Migraine        Past Surgical History:   Procedure Laterality Date     SURGICAL HISTORY OF -   2007         SURGICAL HISTORY OF -   3/2006    Rt Foot mass - Perineurioma       Family History   Problem Relation Age of Onset     Heart Disease Father      Prostate Cancer Father      Diabetes Maternal Grandfather      Heart Disease Paternal Grandfather      Genetic Disorder Daughter         At Birth/ [facility] [place]       Social History     Tobacco Use     Smoking status: Never Smoker     Smokeless tobacco: Never Used   Substance Use Topics     Alcohol use: Yes     Comment: 3-4 drinks per month         O:   /80   Pulse 78 .      Constitutional/ general:  Pt is in no apparent distress, appears well-nourished.  Cooperative with history and physical exam.     Psych:  The patient answered questions appropriately.  Normal affect.  Seems to have reasonable expectations, in terms of treatment.     Eyes:  Visual scanning/ tracking without deficit.     Ears:  Response to auditory stimuli is normal.  No hearing aid devices.  Auricles in proper alignment.     Lymphatic:  Popliteal lymph nodes not enlarged.     Lungs:  Non labored breathing, non labored speech. No cough.  No audible wheezing. Even, quiet breathing.       Vascular:  Pedal pulses are palpable bilaterally for both the DP and PT arteries.  CFT < 3 sec.  No edema.  Pedal hair growth noted.     Neuro:  Alert and oriented x 3. Coordinated gait.  Light touch sensation is intact     Derm: Normal texture and turgor.  No erythema, ecchymosis, or cyanosis.  No  open lesions.  Right hallux and third nail thickened elongated discolored with subungual debris.  There is some dark greenish debris under the right hallux.  The underlying nailbed is intact.    Musculoskeletal:    Lower extremity muscle strength is normal.  Patient is ambulatory without an assistive device or brace.  Normal arch with weightbearing.  No forefoot or rear foot deformities noted.  MS 5/5 all compartments.  Normal ROM all fore foot and rearfoot joints.  No equinus.  Pain under right second metatarsal head plantar.  Negative Lachmans test.  Negative Mulders click.  No pain anywhere else.  No erythema, edema, ecchymosis, or subcutaneous masses noted.      Radiographic Exam:   Unremarkable    A:  Subsecond capsulitis right foot        Right onychomycosis    P:  X rays personally reviewed.  Discussed cause of subsecond capsulitis with patient.  Ice bid.  Instructed to wear stiff soles shoes at all times and I made suggestions for both inside and outside.  Dispensed metatarsal pad to offload this..  Avoid activities that bother this and discussed which activities will aggravate.  Discussed all options with patient regarding onychomycosis.  Took culture today of right hallux nail bed to ensure no pseudomonal growth.  Discussed treating fungus with Diflucan since allergic to terbinafine.  Patient in agreement.  She will stop if any reactions.  Wrote prescription for 3 months.  Thank you for allowing me participate in the care of this patient.              Again, thank you for allowing me to participate in the care of your patient.        Sincerely,        Hakeem Sheehan DPM

## 2021-10-21 NOTE — PATIENT INSTRUCTIONS
We wish you continued good healing. If you have any questions or concerns, please do not hesitate to contact us at  [phone]    [facility] (secure e-mail communication and access to your chart) to send a message or to make an appointment.    Please remember to call and schedule a follow up appointment if one was recommended at your earliest convenience.     PODIATRY CLINIC HOURS  TELEPHONE NUMBER    Dr. Hakeem MONTGOMERYPRAISA [facility]        Clinics:  Jose Manuel Zavala CMA   Tuesday 1PM-6PM  [place]  Wednesday 745AM-330PM  [place]  Thursday/Friday 745AM-230PM  [place]/[facility] [facility] APPOINTMENTS  [phone]    [place] APPOINTMENTS  [phone]          If you need a medication refill, please contact us you may need lab work and/or a follow up visit prior to your refill (i.e. Antifungal medications).    If MRI needed please call Imaging at [phone] or [phone]    HOW DO I GET MY KNEE SCOOTER? Knee scooters can be picked up at ANY Medical Supply stores with your knee scooter Prescription.  OR    Bring your signed prescription to an [facility] Medical Equipment showroom.

## 2021-10-21 NOTE — PROGRESS NOTES
S:  Patient seen today in consult from Dr. Celestin and complains of right foot pain.  Points to plantar second metatarsal head.  Describes as a burning pain.  aggravated by activity and relieved by rest.  Has had this for 6 months.  No pain anywhere else on feet.  Goes barefoot around house.  Works at standing job.  Denies erythema, edema, weakness, numbness.  History of neuroma removal and right second interspace in the past that did not help much.  Also notices thick first and third nails.  She would like to have these treated.  Has been on oral Lamisil in the past but caused itching.    ROS: See above       Allergies   Allergen Reactions     Terbinafine Itching       Current Outpatient Medications   Medication Sig Dispense Refill     fluconazole (DIFLUCAN) 150 MG tablet Take 2 tablets (300 mg) by mouth every 7 days 8 tablet 2     albuterol (PROAIR HFA) 108 (90 Base) MCG/ACT inhaler Inhale 2 puffs into the lungs every 6 hours as needed for shortness of breath / dyspnea or wheezing Profile Rx 18 g 1     buPROPion (WELLBUTRIN XL) 150 MG 24 hr tablet Take 2 tablets (300 mg) by mouth every morning Change in dose 180 tablet 1     citalopram (CELEXA) 20 MG tablet Take 1 tablet (20 mg) by mouth every evening 90 tablet 2     cyclobenzaprine (FLEXERIL) 10 MG tablet Take 1 tablet (10 mg) by mouth 2 times daily as needed for muscle spasms 30 tablet 1     diclofenac (VOLTAREN) 1 % topical gel Place onto the skin 4 times daily (Patient taking differently: Place onto the skin 4 times daily as needed ) 100 g 1     docusate sodium (COLACE) 50 MG capsule Take 50 mg by mouth 2 times daily as needed  (Patient not taking: Reported on 8/25/2021)       etonogestrel-ethinyl estradiol (NUVARING) 0.12-0.015 MG/24HR vaginal ring PLACE 1 RING VAGINALLY EVERY 21 DAYS THEN REMOVE FOR 1 WEEK AND REPEAT 3 each 4     fexofenadine (ALLEGRA) 180 MG tablet Take 180 mg by mouth daily       ibuprofen (ADVIL/MOTRIN) 200 MG tablet Take 200-400 mg by  mouth every 4 hours as needed for mild pain       lisinopril (ZESTRIL) 20 MG tablet Take 0.5 tablets (10 mg) by mouth daily 45 tablet 3     MAGNESIUM PO Take 1 tablet by mouth daily       melatonin 5 MG tablet Take 5 mg by mouth At Bedtime       mometasone (NASONEX) 50 MCG/ACT nasal spray Spray 2 sprays into both nostrils daily as needed (Nasal allergies) Profile prescription 17 g 6     Multiple Vitamins-Minerals (MULTIVITAMIN ADULT PO) Take 1 tablet by mouth daily       nystatin (MYCOSTATIN) 081333 UNIT/GM POWD Use to squirt inside the socks once daily (Patient taking differently: Use to squirt inside the socks once daily PRN) 60 g 3     rizatriptan (MAXALT-MLT) 10 MG ODT Take 1 tablet (10 mg) by mouth See Admin Instructions WITH ONSET OF MIGRAINE, MAY REPEAT ONCE AFTER 2 HOURS. DO NOT EXCEED 3 TABLETS IN 24 HOURS. 6 tablet 6     valACYclovir (VALTREX) 500 MG tablet Take 1 tablet (500 mg) by mouth 2 times daily for 3 days 6 tablet 4       Patient Active Problem List   Diagnosis     Migraine     Anxiety     Mild intermittent asthma without complication     Back pain     Moderate recurrent major depression (H)     Generalized anxiety disorder     Dyslipidemia     Onychomycosis     Therapeutic drug monitoring     Degeneration of thoracic or thoracolumbar intervertebral disc     DDD (degenerative disc disease), cervical     Migraine without aura and without status migrainosus, not intractable     Encounter for surveillance of other contraceptive     Pigmented skin lesion, midback, 3mm     Chronic allergic rhinitis     Skin lesion, left thigh     Chronic right hip pain     Benign essential hypertension     Obesity (BMI 35.0-39.9) with comorbidity (H)     Back muscle spasm     Recurrent cold sores     Chronic foot pain, right     S/P foot surgery, right     Irritable bowel syndrome with both constipation and diarrhea     Previous  delivery, delivered       Past Medical History:   Diagnosis Date     Allergic  rhinitis      Asthma, intermittent      Back pain      Depression with anxiety      Essential hypertension 10/29/2019     Hyperlipidemia LDL goal < 160      Migraine        Past Surgical History:   Procedure Laterality Date     SURGICAL HISTORY OF -   2007         SURGICAL HISTORY OF -   3/2006    Rt Foot mass - Perineurioma       Family History   Problem Relation Age of Onset     Heart Disease Father      Prostate Cancer Father      Diabetes Maternal Grandfather      Heart Disease Paternal Grandfather      Genetic Disorder Daughter         At Birth/ [place]       Social History     Tobacco Use     Smoking status: Never Smoker     Smokeless tobacco: Never Used   Substance Use Topics     Alcohol use: Yes     Comment: 3-4 drinks per month         O:   /80   Pulse 78 .      Constitutional/ general:  Pt is in no apparent distress, appears well-nourished.  Cooperative with history and physical exam.     Psych:  The patient answered questions appropriately.  Normal affect.  Seems to have reasonable expectations, in terms of treatment.     Eyes:  Visual scanning/ tracking without deficit.     Ears:  Response to auditory stimuli is normal.  No hearing aid devices.  Auricles in proper alignment.     Lymphatic:  Popliteal lymph nodes not enlarged.     Lungs:  Non labored breathing, non labored speech. No cough.  No audible wheezing. Even, quiet breathing.       Vascular:  Pedal pulses are palpable bilaterally for both the DP and PT arteries.  CFT < 3 sec.  No edema.  Pedal hair growth noted.     Neuro:  Alert and oriented x 3. Coordinated gait.  Light touch sensation is intact     Derm: Normal texture and turgor.  No erythema, ecchymosis, or cyanosis.  No open lesions.  Right hallux and third nail thickened elongated discolored with subungual debris.  There is some dark greenish debris under the right hallux.  The underlying nailbed is intact.    Musculoskeletal:    Lower extremity muscle strength is  normal.  Patient is ambulatory without an assistive device or brace.  Normal arch with weightbearing.  No forefoot or rear foot deformities noted.  MS 5/5 all compartments.  Normal ROM all fore foot and rearfoot joints.  No equinus.  Pain under right second metatarsal head plantar.  Negative Lachmans test.  Negative Mulders click.  No pain anywhere else.  No erythema, edema, ecchymosis, or subcutaneous masses noted.      Radiographic Exam:   Unremarkable    A:  Subsecond capsulitis right foot        Right onychomycosis    P:  X rays personally reviewed.  Discussed cause of subsecond capsulitis with patient.  Ice bid.  Instructed to wear stiff soles shoes at all times and I made suggestions for both inside and outside.  Dispensed metatarsal pad to offload this..  Avoid activities that bother this and discussed which activities will aggravate.  Discussed all options with patient regarding onychomycosis.  Took culture today of right hallux nail bed to ensure no pseudomonal growth.  Discussed treating fungus with Diflucan since allergic to terbinafine.  Patient in agreement.  She will stop if any reactions.  Wrote prescription for 3 months.  Thank you for allowing me participate in the care of this patient.

## 2021-10-24 LAB
BACTERIA SKIN AEROBE CULT: ABNORMAL

## 2021-10-27 RX ORDER — CIPROFLOXACIN 500 MG/1
500 TABLET, FILM COATED ORAL 2 TIMES DAILY
Qty: 20 TABLET | Refills: 0 | Status: SHIPPED | OUTPATIENT
Start: 2021-10-27 | End: 2022-02-24

## 2021-11-17 ENCOUNTER — ANCILLARY PROCEDURE (OUTPATIENT)
Dept: MAMMOGRAPHY | Facility: CLINIC | Age: 46
End: 2021-11-17
Attending: FAMILY MEDICINE
Payer: COMMERCIAL

## 2021-11-17 DIAGNOSIS — Z12.31 ENCOUNTER FOR SCREENING MAMMOGRAM FOR MALIGNANT NEOPLASM OF BREAST: ICD-10-CM

## 2021-11-17 PROCEDURE — 77067 SCR MAMMO BI INCL CAD: CPT | Mod: GC | Performed by: RADIOLOGY

## 2021-11-17 PROCEDURE — 77063 BREAST TOMOSYNTHESIS BI: CPT | Mod: GC | Performed by: RADIOLOGY

## 2022-02-24 ENCOUNTER — OFFICE VISIT (OUTPATIENT)
Dept: PODIATRY | Facility: CLINIC | Age: 47
End: 2022-02-24
Payer: COMMERCIAL

## 2022-02-24 VITALS
DIASTOLIC BLOOD PRESSURE: 80 MMHG | BODY MASS INDEX: 35.62 KG/M2 | HEART RATE: 68 BPM | SYSTOLIC BLOOD PRESSURE: 122 MMHG | WEIGHT: 236 LBS

## 2022-02-24 DIAGNOSIS — B35.1 ONYCHOMYCOSIS: Primary | ICD-10-CM

## 2022-02-24 LAB
ALT SERPL W P-5'-P-CCNC: 34 U/L (ref 0–50)
AST SERPL W P-5'-P-CCNC: 22 U/L (ref 0–45)

## 2022-02-24 PROCEDURE — 36415 COLL VENOUS BLD VENIPUNCTURE: CPT | Performed by: PODIATRIST

## 2022-02-24 PROCEDURE — 99213 OFFICE O/P EST LOW 20 MIN: CPT | Performed by: PODIATRIST

## 2022-02-24 PROCEDURE — 84450 TRANSFERASE (AST) (SGOT): CPT | Performed by: PODIATRIST

## 2022-02-24 PROCEDURE — 84460 ALANINE AMINO (ALT) (SGPT): CPT | Performed by: PODIATRIST

## 2022-02-24 RX ORDER — FLUCONAZOLE 150 MG/1
300 TABLET ORAL
Qty: 8 TABLET | Refills: 2 | Status: SHIPPED | OUTPATIENT
Start: 2022-02-24 | End: 2022-05-16

## 2022-02-24 NOTE — LETTER
2/24/2022         RE: Treasure Pradhan  [address]  [facility] [address]        Dear Colleague,    Thank you for referring your patient, Treasure Pradhan, to the [facility]. Please see a copy of my visit note below.    Subjective:    Pt is seen today as a f/u pt for bilateral onychomycosis.  She has been treating this with Diflucan 300 mg weekly.  Was on Lamisil in the past but this caused itching.  She denies any itching or any other side effects from taking this.  We also took a culture of her nail which grew out gram-negative bacteria.  She was on Cipro for 10 days and she states this helped with the color of her nail.  She denies any pain or drainage from the nails and has no other new complaints.      ROS: See above       Allergies   Allergen Reactions     Terbinafine Itching       Current Outpatient Medications   Medication Sig Dispense Refill     fluconazole (DIFLUCAN) 150 MG tablet Take 2 tablets (300 mg) by mouth every 7 days 8 tablet 2     albuterol (PROAIR HFA) 108 (90 Base) MCG/ACT inhaler Inhale 2 puffs into the lungs every 6 hours as needed for shortness of breath / dyspnea or wheezing Profile Rx 18 g 1     buPROPion (WELLBUTRIN XL) 150 MG 24 hr tablet Take 2 tablets (300 mg) by mouth every morning Change in dose 180 tablet 1     citalopram (CELEXA) 20 MG tablet Take 1 tablet (20 mg) by mouth every evening 90 tablet 2     cyclobenzaprine (FLEXERIL) 10 MG tablet Take 1 tablet (10 mg) by mouth 2 times daily as needed for muscle spasms 30 tablet 1     diclofenac (VOLTAREN) 1 % topical gel Place onto the skin 4 times daily (Patient taking differently: Place onto the skin 4 times daily as needed ) 100 g 1     docusate sodium (COLACE) 50 MG capsule Take 50 mg by mouth 2 times daily as needed  (Patient not taking: Reported on 8/25/2021)       etonogestrel-ethinyl estradiol (NUVARING) 0.12-0.015 MG/24HR vaginal ring PLACE 1 RING VAGINALLY EVERY 21 DAYS THEN REMOVE FOR 1 WEEK AND  REPEAT 3 each 4     fexofenadine (ALLEGRA) 180 MG tablet Take 180 mg by mouth daily       fluconazole (DIFLUCAN) 150 MG tablet Take 2 tablets (300 mg) by mouth every 7 days 8 tablet 2     ibuprofen (ADVIL/MOTRIN) 200 MG tablet Take 200-400 mg by mouth every 4 hours as needed for mild pain       lisinopril (ZESTRIL) 20 MG tablet Take 0.5 tablets (10 mg) by mouth daily 45 tablet 3     MAGNESIUM PO Take 1 tablet by mouth daily       melatonin 5 MG tablet Take 5 mg by mouth At Bedtime       mometasone (NASONEX) 50 MCG/ACT nasal spray Spray 2 sprays into both nostrils daily as needed (Nasal allergies) Profile prescription 17 g 6     Multiple Vitamins-Minerals (MULTIVITAMIN ADULT PO) Take 1 tablet by mouth daily       nystatin (MYCOSTATIN) 102292 UNIT/GM POWD Use to squirt inside the socks once daily (Patient taking differently: Use to squirt inside the socks once daily PRN) 60 g 3     rizatriptan (MAXALT-MLT) 10 MG ODT Take 1 tablet (10 mg) by mouth See Admin Instructions WITH ONSET OF MIGRAINE, MAY REPEAT ONCE AFTER 2 HOURS. DO NOT EXCEED 3 TABLETS IN 24 HOURS. 6 tablet 6     valACYclovir (VALTREX) 500 MG tablet Take 1 tablet (500 mg) by mouth 2 times daily for 3 days 6 tablet 4       Patient Active Problem List   Diagnosis     Migraine     Anxiety     Mild intermittent asthma without complication     Back pain     Moderate recurrent major depression (H)     Generalized anxiety disorder     Dyslipidemia     Onychomycosis     Therapeutic drug monitoring     Degeneration of thoracic or thoracolumbar intervertebral disc     DDD (degenerative disc disease), cervical     Migraine without aura and without status migrainosus, not intractable     Encounter for surveillance of other contraceptive     Pigmented skin lesion, midback, 3mm     Chronic allergic rhinitis     Skin lesion, left thigh     Chronic right hip pain     Benign essential hypertension     Obesity (BMI 35.0-39.9) with comorbidity (H)     Back muscle spasm      Recurrent cold sores     Chronic foot pain, right     S/P foot surgery, right     Irritable bowel syndrome with both constipation and diarrhea     Previous  delivery, delivered       Past Medical History:   Diagnosis Date     Allergic rhinitis      Asthma, intermittent      Back pain      Depression with anxiety      Essential hypertension 10/29/2019     Hyperlipidemia LDL goal < 160      Migraine        Past Surgical History:   Procedure Laterality Date     SURGICAL HISTORY OF -   2007         SURGICAL HISTORY OF -   3/2006    Rt Foot mass - Perineurioma       Family History   Problem Relation Age of Onset     Heart Disease Father      Prostate Cancer Father      Diabetes Maternal Grandfather      Heart Disease Paternal Grandfather      Genetic Disorder Daughter         At Birth/ [place]       Social History     Tobacco Use     Smoking status: Never Smoker     Smokeless tobacco: Never Used   Substance Use Topics     Alcohol use: Yes     Comment: 3-4 drinks per month         Objective:    /80   Pulse 68   Wt 107 kg (236 lb)   BMI 35.62 kg/m  .  Patient pleasant to talk with and in no distress.  Pulses are palpable DP & PT bilateral.  Sensation to light touch is intact.  no forefoot deformities.  Normal ROM all forefoot joints.  Skin intact bilateral and not dry.  Mycotic nails now clearing proximally.  No evidence of deep abscess, erythema, or infection noted.  No paronychia.  No pain upon palpation to the toe nails.  Nailbed healthy.  No subungual masses noted.  Last LFT's normal.        Assessment:  Onychomycosis     Plan:  Discussed etiology and treatment options with the pt.  Since the medication appears to be effective.  A decision was made to continue Diflucan.  We wrote her a prescription for another 3 months.  She will continue to try to keep her nails short and remove as much mycotic nail as possible.  Sent to lab to recheck LFTs.  Return to clinic in 3 months    Hakeem MENDIOLA  LIBAN Sheehan, FACFAS           Again, thank you for allowing me to participate in the care of your patient.        Sincerely,        Hakeem Sheehan DPM

## 2022-02-24 NOTE — PATIENT INSTRUCTIONS
We wish you continued good healing. If you have any questions or concerns, please do not hesitate to contact us at  [phone]    [facility] (secure e-mail communication and access to your chart) to send a message or to make an appointment.    Please remember to call and schedule a follow up appointment if one was recommended at your earliest convenience.     PODIATRY CLINIC HOURS  TELEPHONE NUMBER    Dr. Hakeem MONTGOMERYPRAISA [facility]        Clinics:  Jose Manuel Zavala CMA   Tuesday 1PM-6PM  [place]  Wednesday 745AM-330PM  [place]  Thursday/Friday 745AM-230PM  [place] APPOINTMENTS  [phone]    [place] APPOINTMENTS  [phone]          If you need a medication refill, please contact us you may need lab work and/or a follow up visit prior to your refill (i.e. Antifungal medications).    If MRI needed please call Imaging at [phone] or [phone]    HOW DO I GET MY KNEE SCOOTER? Knee scooters can be picked up at ANY Medical Supply stores with your knee scooter Prescription.  OR    Bring your signed prescription to an [facility] Medical Equipment showroom.

## 2022-02-24 NOTE — PROGRESS NOTES
Subjective:    Pt is seen today as a f/u pt for bilateral onychomycosis.  She has been treating this with Diflucan 300 mg weekly.  Was on Lamisil in the past but this caused itching.  She denies any itching or any other side effects from taking this.  We also took a culture of her nail which grew out gram-negative bacteria.  She was on Cipro for 10 days and she states this helped with the color of her nail.  She denies any pain or drainage from the nails and has no other new complaints.      ROS: See above       Allergies   Allergen Reactions     Terbinafine Itching       Current Outpatient Medications   Medication Sig Dispense Refill     fluconazole (DIFLUCAN) 150 MG tablet Take 2 tablets (300 mg) by mouth every 7 days 8 tablet 2     albuterol (PROAIR HFA) 108 (90 Base) MCG/ACT inhaler Inhale 2 puffs into the lungs every 6 hours as needed for shortness of breath / dyspnea or wheezing Profile Rx 18 g 1     buPROPion (WELLBUTRIN XL) 150 MG 24 hr tablet Take 2 tablets (300 mg) by mouth every morning Change in dose 180 tablet 1     citalopram (CELEXA) 20 MG tablet Take 1 tablet (20 mg) by mouth every evening 90 tablet 2     cyclobenzaprine (FLEXERIL) 10 MG tablet Take 1 tablet (10 mg) by mouth 2 times daily as needed for muscle spasms 30 tablet 1     diclofenac (VOLTAREN) 1 % topical gel Place onto the skin 4 times daily (Patient taking differently: Place onto the skin 4 times daily as needed ) 100 g 1     docusate sodium (COLACE) 50 MG capsule Take 50 mg by mouth 2 times daily as needed  (Patient not taking: Reported on 8/25/2021)       etonogestrel-ethinyl estradiol (NUVARING) 0.12-0.015 MG/24HR vaginal ring PLACE 1 RING VAGINALLY EVERY 21 DAYS THEN REMOVE FOR 1 WEEK AND REPEAT 3 each 4     fexofenadine (ALLEGRA) 180 MG tablet Take 180 mg by mouth daily       fluconazole (DIFLUCAN) 150 MG tablet Take 2 tablets (300 mg) by mouth every 7 days 8 tablet 2     ibuprofen (ADVIL/MOTRIN) 200 MG tablet Take 200-400 mg by  mouth every 4 hours as needed for mild pain       lisinopril (ZESTRIL) 20 MG tablet Take 0.5 tablets (10 mg) by mouth daily 45 tablet 3     MAGNESIUM PO Take 1 tablet by mouth daily       melatonin 5 MG tablet Take 5 mg by mouth At Bedtime       mometasone (NASONEX) 50 MCG/ACT nasal spray Spray 2 sprays into both nostrils daily as needed (Nasal allergies) Profile prescription 17 g 6     Multiple Vitamins-Minerals (MULTIVITAMIN ADULT PO) Take 1 tablet by mouth daily       nystatin (MYCOSTATIN) 724300 UNIT/GM POWD Use to squirt inside the socks once daily (Patient taking differently: Use to squirt inside the socks once daily PRN) 60 g 3     rizatriptan (MAXALT-MLT) 10 MG ODT Take 1 tablet (10 mg) by mouth See Admin Instructions WITH ONSET OF MIGRAINE, MAY REPEAT ONCE AFTER 2 HOURS. DO NOT EXCEED 3 TABLETS IN 24 HOURS. 6 tablet 6     valACYclovir (VALTREX) 500 MG tablet Take 1 tablet (500 mg) by mouth 2 times daily for 3 days 6 tablet 4       Patient Active Problem List   Diagnosis     Migraine     Anxiety     Mild intermittent asthma without complication     Back pain     Moderate recurrent major depression (H)     Generalized anxiety disorder     Dyslipidemia     Onychomycosis     Therapeutic drug monitoring     Degeneration of thoracic or thoracolumbar intervertebral disc     DDD (degenerative disc disease), cervical     Migraine without aura and without status migrainosus, not intractable     Encounter for surveillance of other contraceptive     Pigmented skin lesion, midback, 3mm     Chronic allergic rhinitis     Skin lesion, left thigh     Chronic right hip pain     Benign essential hypertension     Obesity (BMI 35.0-39.9) with comorbidity (H)     Back muscle spasm     Recurrent cold sores     Chronic foot pain, right     S/P foot surgery, right     Irritable bowel syndrome with both constipation and diarrhea     Previous  delivery, delivered       Past Medical History:   Diagnosis Date     Allergic  rhinitis      Asthma, intermittent      Back pain      Depression with anxiety      Essential hypertension 10/29/2019     Hyperlipidemia LDL goal < 160      Migraine        Past Surgical History:   Procedure Laterality Date     SURGICAL HISTORY OF -   2007         SURGICAL HISTORY OF -   3/2006    Rt Foot mass - Perineurioma       Family History   Problem Relation Age of Onset     Heart Disease Father      Prostate Cancer Father      Diabetes Maternal Grandfather      Heart Disease Paternal Grandfather      Genetic Disorder Daughter         At Birth/ [place]       Social History     Tobacco Use     Smoking status: Never Smoker     Smokeless tobacco: Never Used   Substance Use Topics     Alcohol use: Yes     Comment: 3-4 drinks per month         Objective:    /80   Pulse 68   Wt 107 kg (236 lb)   BMI 35.62 kg/m  .  Patient pleasant to talk with and in no distress.  Pulses are palpable DP & PT bilateral.  Sensation to light touch is intact.  no forefoot deformities.  Normal ROM all forefoot joints.  Skin intact bilateral and not dry.  Mycotic nails now clearing proximally.  No evidence of deep abscess, erythema, or infection noted.  No paronychia.  No pain upon palpation to the toe nails.  Nailbed healthy.  No subungual masses noted.  Last LFT's normal.        Assessment:  Onychomycosis     Plan:  Discussed etiology and treatment options with the pt.  Since the medication appears to be effective.  A decision was made to continue Diflucan.  We wrote her a prescription for another 3 months.  She will continue to try to keep her nails short and remove as much mycotic nail as possible.  Sent to lab to recheck LFTs.  Return to clinic in 3 months    Hakeem Sheehan, LIBAN, FACFAS

## 2022-02-27 DIAGNOSIS — M62.830 BACK MUSCLE SPASM: ICD-10-CM

## 2022-03-01 RX ORDER — CYCLOBENZAPRINE HCL 10 MG
TABLET ORAL
Qty: 30 TABLET | Refills: 0 | Status: SHIPPED | OUTPATIENT
Start: 2022-03-01 | End: 2022-05-16

## 2022-03-01 NOTE — TELEPHONE ENCOUNTER
Routing refill request to provider for review/approval because:  Drug not on the [facility] refill protocol     Gale Mathur RN, [facility]

## 2022-03-02 NOTE — TELEPHONE ENCOUNTER
Lvm for patient and informed her that refill request has been sent by provider and that she is due for labs and med check. Provided clinic number for call back.

## 2022-03-02 NOTE — TELEPHONE ENCOUNTER
Please schedule for fasting labs and virtual medication recheck visit , a day after the labs this month  Refill is sent as requested.

## 2022-04-05 ASSESSMENT — PATIENT HEALTH QUESTIONNAIRE - PHQ9
10. IF YOU CHECKED OFF ANY PROBLEMS, HOW DIFFICULT HAVE THESE PROBLEMS MADE IT FOR YOU TO DO YOUR WORK, TAKE CARE OF THINGS AT HOME, OR GET ALONG WITH OTHER PEOPLE: SOMEWHAT DIFFICULT
SUM OF ALL RESPONSES TO PHQ QUESTIONS 1-9: 11
SUM OF ALL RESPONSES TO PHQ QUESTIONS 1-9: 11

## 2022-04-05 ASSESSMENT — ANXIETY QUESTIONNAIRES
GAD7 TOTAL SCORE: 7
GAD7 TOTAL SCORE: 7
5. BEING SO RESTLESS THAT IT IS HARD TO SIT STILL: NOT AT ALL
7. FEELING AFRAID AS IF SOMETHING AWFUL MIGHT HAPPEN: SEVERAL DAYS
6. BECOMING EASILY ANNOYED OR IRRITABLE: SEVERAL DAYS
2. NOT BEING ABLE TO STOP OR CONTROL WORRYING: SEVERAL DAYS
3. WORRYING TOO MUCH ABOUT DIFFERENT THINGS: SEVERAL DAYS
4. TROUBLE RELAXING: MORE THAN HALF THE DAYS
7. FEELING AFRAID AS IF SOMETHING AWFUL MIGHT HAPPEN: SEVERAL DAYS
GAD7 TOTAL SCORE: 7
1. FEELING NERVOUS, ANXIOUS, OR ON EDGE: SEVERAL DAYS

## 2022-04-06 ASSESSMENT — PATIENT HEALTH QUESTIONNAIRE - PHQ9: SUM OF ALL RESPONSES TO PHQ QUESTIONS 1-9: 11

## 2022-04-06 ASSESSMENT — ANXIETY QUESTIONNAIRES: GAD7 TOTAL SCORE: 7

## 2022-04-11 ENCOUNTER — VIRTUAL VISIT (OUTPATIENT)
Dept: FAMILY MEDICINE | Facility: CLINIC | Age: 47
End: 2022-04-11
Payer: COMMERCIAL

## 2022-04-11 ENCOUNTER — TELEPHONE (OUTPATIENT)
Dept: FAMILY MEDICINE | Facility: CLINIC | Age: 47
End: 2022-04-11
Payer: COMMERCIAL

## 2022-04-11 DIAGNOSIS — J45.20 MILD INTERMITTENT ASTHMA WITHOUT COMPLICATION: ICD-10-CM

## 2022-04-11 DIAGNOSIS — Z12.11 SCREEN FOR COLON CANCER: ICD-10-CM

## 2022-04-11 DIAGNOSIS — G89.29 CHRONIC RIGHT HIP PAIN: ICD-10-CM

## 2022-04-11 DIAGNOSIS — Z13.0 SCREENING FOR DEFICIENCY ANEMIA: ICD-10-CM

## 2022-04-11 DIAGNOSIS — F41.9 ANXIETY: ICD-10-CM

## 2022-04-11 DIAGNOSIS — F33.1 MODERATE RECURRENT MAJOR DEPRESSION (H): Primary | ICD-10-CM

## 2022-04-11 DIAGNOSIS — Z13.220 SCREENING FOR HYPERLIPIDEMIA: ICD-10-CM

## 2022-04-11 DIAGNOSIS — Z13.1 SCREENING FOR DIABETES MELLITUS: ICD-10-CM

## 2022-04-11 DIAGNOSIS — E66.01 MORBID OBESITY (H): ICD-10-CM

## 2022-04-11 DIAGNOSIS — G43.709 CHRONIC MIGRAINE WITHOUT AURA WITHOUT STATUS MIGRAINOSUS, NOT INTRACTABLE: ICD-10-CM

## 2022-04-11 DIAGNOSIS — I10 BENIGN ESSENTIAL HYPERTENSION: ICD-10-CM

## 2022-04-11 DIAGNOSIS — M25.551 CHRONIC RIGHT HIP PAIN: ICD-10-CM

## 2022-04-11 PROCEDURE — 99214 OFFICE O/P EST MOD 30 MIN: CPT | Mod: GT | Performed by: FAMILY MEDICINE

## 2022-04-11 PROCEDURE — 96127 BRIEF EMOTIONAL/BEHAV ASSMT: CPT | Mod: GT | Performed by: FAMILY MEDICINE

## 2022-04-11 RX ORDER — RIZATRIPTAN BENZOATE 10 MG/1
10 TABLET, ORALLY DISINTEGRATING ORAL SEE ADMIN INSTRUCTIONS
Qty: 6 TABLET | Refills: 6 | Status: SHIPPED | OUTPATIENT
Start: 2022-04-11 | End: 2022-09-14

## 2022-04-11 RX ORDER — CITALOPRAM HYDROBROMIDE 20 MG/1
30 TABLET ORAL EVERY EVENING
Qty: 135 TABLET | Refills: 1 | Status: SHIPPED | OUTPATIENT
Start: 2022-04-11 | End: 2022-09-14

## 2022-04-11 RX ORDER — LISINOPRIL 20 MG/1
10 TABLET ORAL DAILY
Qty: 45 TABLET | Refills: 3 | Status: SHIPPED | OUTPATIENT
Start: 2022-04-11 | End: 2022-09-14 | Stop reason: DRUGHIGH

## 2022-04-11 RX ORDER — BUPROPION HYDROCHLORIDE 150 MG/1
150 TABLET ORAL EVERY MORNING
Qty: 90 TABLET | Refills: 1 | Status: SHIPPED | OUTPATIENT
Start: 2022-04-11 | End: 2022-09-14

## 2022-04-11 ASSESSMENT — PATIENT HEALTH QUESTIONNAIRE - PHQ9
SUM OF ALL RESPONSES TO PHQ QUESTIONS 1-9: 11
10. IF YOU CHECKED OFF ANY PROBLEMS, HOW DIFFICULT HAVE THESE PROBLEMS MADE IT FOR YOU TO DO YOUR WORK, TAKE CARE OF THINGS AT HOME, OR GET ALONG WITH OTHER PEOPLE: SOMEWHAT DIFFICULT

## 2022-04-11 ASSESSMENT — ANXIETY QUESTIONNAIRES: GAD7 TOTAL SCORE: 7

## 2022-04-11 NOTE — LETTER
My Asthma Action Plan  Name: Treasure Pradhan   YOB: 1975  Date: 4/11/2022   My doctor: Nelida Celestin   My clinic: [facility]      My Control Medicine: None        Dose:   My Rescue Medicine: Albuterol        Dose: 2 puffs every 4-6 hours as needed for wheezing   My Asthma Severity: Intermittent / Exercise Induced  Avoid your asthma triggers: upper respiratory infections and pollens        GREEN ZONE   Good Control    I feel good    No cough or wheeze    Can work, sleep and play without asthma symptoms       Take your asthma control medicine every day.     1. If exercise triggers your asthma, take your rescue medication    15 minutes before exercise or sports, and    During exercise if you have asthma symptoms  2. Spacer to use with inhaler: If you have a spacer, make sure to use it with your inhaler             YELLOW ZONE Getting Worse  I have ANY of these:    I do not feel good    Cough or wheeze    Chest feels tight    Wake up at night   1. Keep taking your Green Zone medications  2. Start taking your rescue medicine:    every 20 minutes for up to 1 hour. Then every 4 hours for 24-48 hours.  3. If you stay in the Yellow Zone for more than 12-24 hours, contact your doctor.  4. If you do not return to the Green Zone in 12-24 hours or you get worse, start taking your oral steroid medicine if prescribed by your provider.           RED ZONE Medical Alert - Get Help  I have ANY of these:    I feel awful    Medicine is not helping    Breathing getting harder    Trouble walking or talking    Nose opens wide to breathe       1. Take your rescue medicine NOW  2. If your provider has prescribed an oral steroid medicine, start taking it NOW  3. Call your doctor NOW  4. If you are still in the Red Zone after 20 minutes and you have not reached your doctor:    Take your rescue medicine again and    Call 911 or go to the emergency room right away    See your regular doctor within 2  weeks of an Emergency Room or Urgent Care visit for follow-up treatment.        The above medication may be given at school or day care?: N/A (Adult Patient)  Child can carry and use inhaler(s) at school with approval of school nurse?: N/A (Adult Patient)    Electronically signed by: Nelida Celestin MD, April 11, 2022    Annual Reminders:  Meet with Asthma Educator,  Flu Shot in the Fall, consider Pneumonia Vaccination for patients with asthma (aged 19 and older).    Pharmacy: [id number] [place] [phone] [facility]                    Asthma Triggers  How To Control Things That Make Your Asthma Worse    Triggers are things that make your asthma worse.  Look at the list below to help you find your triggers and what you can do about them.  You can help prevent asthma flare-ups by staying away from your triggers.      Trigger                                                          What you can do   Cigarette Smoke  Tobacco smoke can make asthma worse. Do not allow smoking in your home, car or around you.  Be sure no one smokes at a child s day care or school.  If you smoke, ask your health care provider for ways to help you quit.  Ask family members to quit too.  Ask your health care provider for a referral to Quit Plan to help you quit smoking, or call 3-804-108-PLAN.     Colds, Flu, Bronchitis  These are common triggers of asthma. Wash your hands often.  Don t touch your eyes, nose or mouth.  Get a flu shot every year.     Dust Mites  These are tiny bugs that live in cloth or carpet. They are too small to see. Wash sheets and blankets in hot water every week.   Encase pillows and mattress in dust mite proof covers.  Avoid having carpet if you can. If you have carpet, vacuum weekly.   Use a dust mask and HEPA vacuum.   Pollen and Outdoor Mold  Some people are allergic to trees, grass, or weed pollen, or molds. Try to keep your windows closed.  Limit time out doors when pollen count is  high.   Ask you health care provider about taking medicine during allergy season.     Animal Dander  Some people are allergic to skin flakes, urine or saliva from pets with fur or feathers. Keep pets with fur or feathers out of your home.    If you can t keep the pet outdoors, then keep the pet out of your bedroom.  Keep the bedroom door closed.  Keep pets off cloth furniture and away from stuffed toys.     Mice, Rats, and Cockroaches  Some people are allergic to the waste from these pests.   Cover food and garbage.  Clean up spills and food crumbs.  Store grease in the refrigerator.   Keep food out of the bedroom.   Indoor Mold  This can be a trigger if your home has high moisture. Fix leaking faucets, pipes, or other sources of water.   Clean moldy surfaces.  Dehumidify basement if it is damp and smelly.   Smoke, Strong Odors, and Sprays  These can reduce air quality. Stay away from strong odors and sprays, such as perfume, powder, hair spray, paints, smoke incense, paint, cleaning products, candles and new carpet.   Exercise or Sports  Some people with asthma have this trigger. Be active!  Ask your doctor about taking medicine before sports or exercise to prevent symptoms.    Warm up for 5-10 minutes before and after sports or exercise.     Other Triggers of Asthma  Cold air:  Cover your nose and mouth with a scarf.  Sometimes laughing or crying can be a trigger.  Some medicines and food can trigger asthma.

## 2022-04-11 NOTE — PROGRESS NOTES
Treasure is a 46 year old who is being evaluated via a billable video visit.      How would you like to obtain your AVS? [employer]  If the video visit is dropped, the invitation should be resent by: Text to cell phone: [phone]  Will anyone else be joining your video visit? No    Video Start Time: 7:39 AM    Assessment & Plan     Moderate recurrent major depression (H)  PHQ 6/7/2021 9/29/2021 4/5/2022   PHQ-9 Total Score 11 9 11   Q9: Thoughts of better off dead/self-harm past 2 weeks Not at all Not at all Not at all     Needing improvement  At her last visit in September, Wellbutrin dose was increased from 150 to 300mg.  But patient was not able to tolerate it due to blurred vision and hence she backed down to 150 mg once in the morning  Recommended to increase the dose of Celexa in the evening from 20 mg to 30 mg daily, continue current dose of Wellbutrin 150 mg daily in the morning, follow-up for recheck through [facility] in 4 to 6 weeks or sooner if needed  Start regular exercises as mentioned below  - buPROPion (WELLBUTRIN XL) 150 MG 24 hr tablet; Take 1 tablet (150 mg) by mouth every morning Change in dose  - citalopram (CELEXA) 20 MG tablet; Take 1.5 tablets (30 mg) by mouth every evening Dose change  - EMOTIONAL / BEHAVIORAL ASSESSMENT    Anxiety  NATANAEL-7 SCORE 9/22/2020 9/29/2021 4/5/2022   Total Score - - -   Total Score - 8 (mild anxiety) 7 (mild anxiety)   Total Score 9 8 7       as above    - buPROPion (WELLBUTRIN XL) 150 MG 24 hr tablet; Take 1 tablet (150 mg) by mouth every morning Change in dose  - citalopram (CELEXA) 20 MG tablet; Take 1.5 tablets (30 mg) by mouth every evening Dose change  - EMOTIONAL / BEHAVIORAL ASSESSMENT    Chronic migraine without aura without status migrainosus, not intractable  Stable, continue with current medications, recheck in 6 months or sooner if needed  - rizatriptan (MAXALT-MLT) 10 MG ODT; Take 1 tablet (10 mg) by mouth See Admin Instructions WITH ONSET OF MIGRAINE, MAY  REPEAT ONCE AFTER 2 HOURS. DO NOT EXCEED 3 TABLETS IN 24 HOURS.    Chronic right hip pain  Affecting her ability to lose weight, and exercise  Due to chronicity of symptoms, recommended to consult sports medicine orthopedic physician for further evaluation  - Orthopedic  Referral; Future    Morbid obesity (H)  Wt Readings from Last 5 Encounters:   02/24/22 107 kg (236 lb)   09/29/21 107.8 kg (237 lb 9 oz)   08/25/21 111.1 kg (245 lb)   03/22/21 107.5 kg (237 lb 1.6 oz)   09/22/20 102.2 kg (225 lb 6.4 oz)     Discussed extensively on time restricted feeding/intermittent fasting, avoid simple sugars, sugary beverages, follow portion control, start on regular exercises follow-up for recheck, PCP in September or sooner if needed      Screen for colon cancer    - Adult Gastro Ref - Procedure Only; Future    Screening for hyperlipidemia    - Lipid panel reflex to direct LDL Fasting; Future    Benign essential hypertension  BP Readings from Last 3 Encounters:   02/24/22 122/80   10/21/21 126/80   09/29/21 117/78     Reviewed normal home blood pressure readings, continue with current medications, Will follow low salt diet, weight loss and regular exercises.  Recheck along with fasting labs at the time of physical in September or sooner if needed  - Albumin Random Urine Quantitative with Creat Ratio; Future  - CBC with platelets; Future  - Comprehensive metabolic panel (BMP + Alb, Alk Phos, ALT, AST, Total. Bili, TP); Future  - lisinopril (ZESTRIL) 20 MG tablet; Take 0.5 tablets (10 mg) by mouth in the morning.    Screening for deficiency anemia    - CBC with platelets; Future    Screening for diabetes mellitus    - Comprehensive metabolic panel (BMP + Alb, Alk Phos, ALT, AST, Total. Bili, TP); Future    Mild intermittent asthma without complication    - Asthma Action Plan (AAP)    Review of the result(s) of each unique test - CMP  [id number]}     BMI:   Estimated body mass index is 35.62 kg/m  as calculated from the  "following:    Height as of 9/29/21: 1.734 m (5' 8.25\").    Weight as of 2/24/22: 107 kg (236 lb).   Weight management plan: Discussed healthy diet and exercise guidelines    Work on weight loss  Regular exercise  Chart documentation done in part with Dragon Voice recognition Software. Although reviewed after completion, some word and grammatical error may remain.    See Patient Instructions    Return in about 5 months (around 9/11/2022), or if symptoms worsen or fail to improve, for Physical Exam, fasting labs.    Nelida Celestin MD  [facility] SAMUEL Amanda is a 46 year old who presents for the following health issues   Patient is here for a video visit instead of in person visit due to the current COVID-19 pandemic.      History of Present Illness       Back Pain:  She presents for follow up of back pain. Patient's back pain is a chronic problem.  Location of back pain:  Right upper back, left upper back, right buttock and right hip  Description of back pain: burning and stabbing  Back pain spreads: right buttocks, right shoulder and left shoulder    Since patient first noticed back pain, pain is: always present, but gets better and worse  Does back pain interfere with her job:  Yes      Mental Health Follow-up:  Patient presents to follow-up on Depression & Anxiety.Patient's depression since last visit has been:  Better  The patient is having other symptoms associated with depression.  Patient's anxiety since last visit has been:  Better  The patient is having other symptoms associated with anxiety.  Any significant life events: No  Patient is feeling anxious or having panic attacks.  Patient has no concerns about alcohol or drug use.       Today's PHQ-9         PHQ-9 Total Score: 11  PHQ-9 Q9 Thoughts of better off dead/self-harm past 2 weeks :   (P) Not at all    How difficult have these problems made it for you to do your work, take care of things at home, or get along with " other people: Somewhat difficult    Today's NATANAEL-7 Score: 7    Hypertension: She presents for follow up of hypertension.  She does not check blood pressure  regularly outside of the clinic. Outpatient blood pressures have not been over 140/90. She does not follow a low salt diet.     She eats 4 or more servings of fruits and vegetables daily.She consumes 1 sweetened beverage(s) daily.She exercises with enough effort to increase her heart rate 9 or less minutes per day.  She exercises with enough effort to increase her heart rate 3 or less days per week.   She is taking medications regularly.       Medication check up on citalopram, bupropion and lisinopril   Pt has been changing dose on bupropion, lower dose. pt reports having blurry vision from high dose of bupropion      Chronic right hip pain- complaining of ongoing pain in the lower back and right hip since she-slipped and fell on ice few months ago  Patient was doing conservative treatment at home, feels intermittent, has not resolved completely since the time of injury  This has been affecting her ability to walk and exercise  Hypertension Follow-up      Do you check your blood pressure regularly outside of the clinic? Yes     Are you following a low salt diet? No    Are your blood pressures ever more than 140 on the top number (systolic) OR more   than 90 on the bottom number (diastolic), for example 140/90?  Sometimes, noted slight elevated diastolic blood pressures been 85-90    Depression and Anxiety Follow-Up    How are you doing with your depression since your last visit?  Needing improvement    How are you doing with your anxiety since your last visit?  as above      Are you having other symptoms that might be associated with depression or anxiety? No    Have you had a significant life event? No     Do you have any concerns with your use of alcohol or other drugs? No    Social History     Tobacco Use     Smoking status: Never Smoker     Smokeless  tobacco: Never Used   Vaping Use     Vaping Use: Never used   Substance Use Topics     Alcohol use: Yes     Comment: 3-4 drinks per month     Drug use: No     PHQ 6/7/2021 9/29/2021 4/5/2022   PHQ-9 Total Score 11 9 11   Q9: Thoughts of better off dead/self-harm past 2 weeks Not at all Not at all Not at all     NATANAEL-7 SCORE 9/22/2020 9/29/2021 4/5/2022   Total Score - - -   Total Score - 8 (mild anxiety) 7 (mild anxiety)   Total Score 9 8 7     Last PHQ-9 4/5/2022   1.  Little interest or pleasure in doing things 1   2.  Feeling down, depressed, or hopeless 0   3.  Trouble falling or staying asleep, or sleeping too much 3   4.  Feeling tired or having little energy 3   5.  Poor appetite or overeating 2   6.  Feeling bad about yourself 0   7.  Trouble concentrating 1   8.  Moving slowly or restless 1   Q9: Thoughts of better off dead/self-harm past 2 weeks 0   PHQ-9 Total Score 11   Difficulty at work, home, or with people -     NATANAEL-7  4/5/2022   1. Feeling nervous, anxious, or on edge 1   2. Not being able to stop or control worrying 1   3. Worrying too much about different things 1   4. Trouble relaxing 2   5. Being so restless that it is hard to sit still 0   6. Becoming easily annoyed or irritable 1   7. Feeling afraid, as if something awful might happen 1   NATANAEL-7 Total Score 7   If you checked any problems, how difficult have they made it for you to do your work, take care of things at home, or get along with other people? -       Suicide Assessment Five-step Evaluation and Treatment (SAFE-T)      Review of Systems   CONSTITUTIONAL: NEGATIVE for fever, chills, change in weight  RESP: NEGATIVE for significant cough or SOB  History of asthma  CV: NEGATIVE for chest pain, palpitations or peripheral edema  CV: History of hypertension  GI: NEGATIVE for nausea, abdominal pain, heartburn, or change in bowel habits  MUSCULOSKELETAL: Chronic right knee pain   NEURO: NEGATIVE for weakness, dizziness or paresthesias and Hx  headaches-migraine  ENDOCRINE: NEGATIVE for temperature intolerance, skin/hair changes  HEME/ALLERGY/IMMUNE: NEGATIVE for bleeding problems  PSYCHIATRIC: HX anxiety and HX depression      Objective    Vitals - Patient Reported  Systolic (Patient Reported): 125  Diastolic (Patient Reported): 85  Pain Score: Mild Pain (2)  Pain Loc: Hip      Vitals:  No vitals were obtained today due to virtual visit.    Physical Exam   GENERAL: Healthy, alert and no distress  EYES: Eyes grossly normal to inspection  RESP: No audible wheeze, cough, or visible cyanosis.  No visible retractions or increased work of breathing.    SKIN: Visible skin-clear  NEURO: Cranial nerves grossly intact.  Mentation and speech appropriate for age.  PSYCH: Mentation appears normal, affect normal/bright, judgement and insight intact, normal speech and appearance well-groomed.                Video-Visit Details    Type of service:  Video Visit    Video End Time:7:49 AM    Originating Location (pt. Location): Home    Distant Location (provider location):  [facility]     Platform used for Video Visit: Tropp[employer]

## 2022-04-19 ENCOUNTER — TELEPHONE (OUTPATIENT)
Dept: GASTROENTEROLOGY | Facility: CLINIC | Age: 47
End: 2022-04-19
Payer: COMMERCIAL

## 2022-04-19 DIAGNOSIS — Z11.59 ENCOUNTER FOR SCREENING FOR OTHER VIRAL DISEASES: Primary | ICD-10-CM

## 2022-04-19 NOTE — TELEPHONE ENCOUNTER
Screening Questions    BlueKIND OF PREP RedLOCATION [review exclusion criteria] GreenSEDATION TYPE      1. Are you active on mychart? Y    2. What insurance is in the chart? HP      3.   Ordering/Referring Provider: Nelida Celestin MD      4. BMI   (If greater than 40 review exclusion criteria [PAC APPT IF [MAC] @ [facility])  35.3  [If yes, BMI OVER 40-EXTENDED PREP]      **(Sedation review/consideration needed)**  Do you have a legal guardian or Medical Power of    and/or are you able to give consent for your medical care?     SELF     5. Have you had a positive covid test in the last 90 days?   N     6.  Are you currently on dialysis?   N [ If yes, G-PREP & HOSPITAL setting ONLY]     7.  Do you have chronic kidney disease?  N [ If yes, G-PREP ]    8.   Do you have a diagnosis of diabetes?   N   [ If yes, G-PREP ]    9.  On a regular basis do you go 3-5 days between bowel movements?   N   [ If yes, EXTENDED PREP]    10.  Are you taking any prescription pain medications on a routine schedule?    N  [ If yes, EXTENDED PREP] [If yes, MAC]      11.   Do you have any chemical dependencies such as alcohol, street drugs, or methadone?    N [If yes, MAC]    12.   Do you have any history of post-traumatic stress syndrome, severe anxiety or history of psychosis?    N  [If yes, MAC]    13.  [FEMALES] Are you currently pregnant? N    If yes, how many weeks?       Respiratory/Heart Screening:  [If yes to any of the following HOSPITAL setting only]     14. Do you have Pulmonary Hypertension [Lungs]?   N       15. Do you have UNCONTROLLED asthma?   N     16.  Do you use daily home oxygen?  N      17. Do you have mod to severe Obstructive Sleep Apnea?         (OKAY @ [facility]  [facility]  SH  PH  RI  MG - if pt is not on OXYGEN)  N      18.   Have you had a heart or lung transplant?   N      19.   Have you had a stroke or Transient ischemic attack (TIA - aka  mini stroke ) within 6 months?  (If yes, please review exclusion  criteria)  N     20.   In the past 6 months, have you had any heart related issues including cardiomyopathy or heart attack?   N           If yes, did it require cardiac stenting or other implantable device?   N      21.   Do you have any implantable devices in your body (pacemaker, defib, LVAD)? (If yes, please review exclusion criteria)  N     22. Do you take nitroglycerin?   N           If yes, how often? N  (if yes, HOSPITAL setting ONLY)    23.  Are you currently taking any blood thinners?    [If yes, INFORM patient to follw up w/ ORDERING PROVIDER FOR BRIDGING INSTRUCTIONS]     N    24.   Do you transfer independently?                (If NO, please HOSPITAL setting ONLY)  Y    25.   Preferred LOCAL Pharmacy for Pre Prescription:      "[id number] [address] [facility]    Scheduling Details  (Please ask for phone number if not scheduled by patient)      Caller : Treasure Pradhan    Date of Procedure: 05/19/2022  Surgeon: cici   Location: [facility]; [address] [place] [address], [address], [place] [address]      Sedation Type: cs  l per protocol   Conscious Sedation- Needs  for 6 hours after the procedure  MAC/General-Needs  for 24 hours after procedure    n :[Pre-op Required] at [facility]  SH  MG and OR for MAC sedation   (advise patient they will need a pre-op WITH IN 30 DAYS of procedure date)     Type of Procedure Scheduled:   Lower Endoscopy [Colonoscopy]    Which Colonoscopy Prep was Sent?:   sm  - per protocol     KHOR[facility]S CF PATIENTS & GRO[facility]'S PATIENTS NEEDS EXTENDED PREP       Informed patient they will need an adult  y  Cannot take any type of public or medical transportation alone    Pre-Procedure [facility] or Externally: y    Confirmed Nurse will call to complete assessment y    Additional comments: n

## 2022-04-19 NOTE — TELEPHONE ENCOUNTER
DIAGNOSIS: Chronic right hip pain   APPOINTMENT DATE: 05/05/2022   NOTES STATUS DETAILS   OFFICE NOTE from referring provider Internal 04/11/2022 Dr Celestin [facility]    OFFICE NOTE from other specialist N/A    DISCHARGE SUMMARY from hospital N/A    DISCHARGE REPORT from the ER N/A    OPERATIVE REPORT N/A    EMG report N/A    MEDICATION LIST N/A    MRI N/A    DEXA (osteoporosis/bone health) N/A    CT SCAN N/A    XRAYS (IMAGES & REPORTS) N/A

## 2022-05-05 ENCOUNTER — PRE VISIT (OUTPATIENT)
Dept: ORTHOPEDICS | Facility: CLINIC | Age: 47
End: 2022-05-05
Payer: COMMERCIAL

## 2022-05-05 ENCOUNTER — OFFICE VISIT (OUTPATIENT)
Dept: ORTHOPEDICS | Facility: CLINIC | Age: 47
End: 2022-05-05
Payer: COMMERCIAL

## 2022-05-05 DIAGNOSIS — M25.551 CHRONIC RIGHT HIP PAIN: ICD-10-CM

## 2022-05-05 DIAGNOSIS — G89.29 CHRONIC RIGHT HIP PAIN: ICD-10-CM

## 2022-05-05 DIAGNOSIS — M70.61 GREATER TROCHANTERIC BURSITIS OF RIGHT HIP: Primary | ICD-10-CM

## 2022-05-05 DIAGNOSIS — M54.16 LUMBAR RADICULAR PAIN: ICD-10-CM

## 2022-05-05 PROCEDURE — 99204 OFFICE O/P NEW MOD 45 MIN: CPT | Performed by: PREVENTIVE MEDICINE

## 2022-05-05 RX ORDER — METHOCARBAMOL 500 MG/1
500-1000 TABLET, FILM COATED ORAL
Qty: 60 TABLET | Refills: 0 | Status: SHIPPED | OUTPATIENT
Start: 2022-05-05 | End: 2023-03-29

## 2022-05-05 RX ORDER — CELECOXIB 100 MG/1
100 CAPSULE ORAL 2 TIMES DAILY PRN
Qty: 60 CAPSULE | Refills: 1 | Status: SHIPPED | OUTPATIENT
Start: 2022-05-05

## 2022-05-05 RX ORDER — METHYLPREDNISOLONE 4 MG
TABLET, DOSE PACK ORAL
Qty: 21 TABLET | Refills: 0 | Status: SHIPPED | OUTPATIENT
Start: 2022-05-05 | End: 2022-05-16

## 2022-05-05 NOTE — LETTER
2022         RE: Treasure Pradhan  [address]  [facility] [address]        Dear Colleague,    Thank you for referring your patient, Treasure Pradhan, to the [facility] [place]. Please see a copy of my visit note below.    HISTORY OF PRESENT ILLNESS  Ms. Pradhan is a pleasant 46 year old year old female who presents to clinic today with   Treasure explains that she has had right hip pain on/off for the past several months getting more aggravating  Location: low back and right hip  Quality:  achy pain    Severity: 6/10 at worst    Duration: see above  Timing: occurs intermittently  Context: occurs while exercising and lifting  Modifying factors:  resting and non-use makes it better, movement and use makes it worse  Associated signs & symptoms: some low back pain  Additional history: as documented    MEDICAL HISTORY  Patient Active Problem List   Diagnosis     Migraine     Anxiety     Mild intermittent asthma without complication     Back pain     Moderate recurrent major depression (H)     Generalized anxiety disorder     Dyslipidemia     Onychomycosis     Therapeutic drug monitoring     Degeneration of thoracic or thoracolumbar intervertebral disc     DDD (degenerative disc disease), cervical     Migraine without aura and without status migrainosus, not intractable     Encounter for surveillance of other contraceptive     Pigmented skin lesion, midback, 3mm     Chronic allergic rhinitis     Skin lesion, left thigh     Chronic right hip pain     Benign essential hypertension     Obesity (BMI 35.0-39.9) with comorbidity (H)     Back muscle spasm     Recurrent cold sores     Chronic foot pain, right     S/P foot surgery, right     Irritable bowel syndrome with both constipation and diarrhea     Previous  delivery, delivered       Current Outpatient Medications   Medication Sig Dispense Refill     albuterol (PROAIR HFA) 108 (90 Base) MCG/ACT inhaler Inhale 2 puffs into  the lungs every 6 hours as needed for shortness of breath / dyspnea or wheezing Profile Rx 18 g 1     buPROPion (WELLBUTRIN XL) 150 MG 24 hr tablet Take 1 tablet (150 mg) by mouth every morning Change in dose 90 tablet 1     citalopram (CELEXA) 20 MG tablet Take 1.5 tablets (30 mg) by mouth every evening Dose change 135 tablet 1     cyclobenzaprine (FLEXERIL) 10 MG tablet TAKE ONE TABLET BY MOUTH TWICE A DAY AS NEEDED 30 tablet 0     diclofenac (VOLTAREN) 1 % topical gel Place onto the skin 4 times daily (Patient taking differently: Place onto the skin 4 times daily as needed) 100 g 1     docusate sodium (COLACE) 50 MG capsule Take 50 mg by mouth as needed in the morning and 50 mg as needed in the evening.       etonogestrel-ethinyl estradiol (NUVARING) 0.12-0.015 MG/24HR vaginal ring PLACE 1 RING VAGINALLY EVERY 21 DAYS THEN REMOVE FOR 1 WEEK AND REPEAT 3 each 4     fexofenadine (ALLEGRA) 180 MG tablet Take 180 mg by mouth daily       fluconazole (DIFLUCAN) 150 MG tablet Take 2 tablets (300 mg) by mouth every 7 days 8 tablet 2     fluconazole (DIFLUCAN) 150 MG tablet Take 2 tablets (300 mg) by mouth every 7 days 8 tablet 2     ibuprofen (ADVIL/MOTRIN) 200 MG tablet Take 200-400 mg by mouth every 4 hours as needed for mild pain       lisinopril (ZESTRIL) 20 MG tablet Take 0.5 tablets (10 mg) by mouth in the morning. 45 tablet 3     MAGNESIUM PO Take 1 tablet by mouth daily       melatonin 5 MG tablet Take 5 mg by mouth At Bedtime       mometasone (NASONEX) 50 MCG/ACT nasal spray Spray 2 sprays into both nostrils daily as needed (Nasal allergies) Profile prescription 17 g 6     Multiple Vitamins-Minerals (MULTIVITAMIN ADULT PO) Take 1 tablet by mouth daily       nystatin (MYCOSTATIN) 081038 UNIT/GM POWD Use to squirt inside the socks once daily (Patient taking differently: Use to squirt inside the socks once daily PRN) 60 g 3     rizatriptan (MAXALT-MLT) 10 MG ODT Take 1 tablet (10 mg) by mouth See Admin Instructions  WITH ONSET OF MIGRAINE, MAY REPEAT ONCE AFTER 2 HOURS. DO NOT EXCEED 3 TABLETS IN 24 HOURS. 6 tablet 6     valACYclovir (VALTREX) 500 MG tablet Take 1 tablet (500 mg) by mouth 2 times daily for 3 days 6 tablet 4       Allergies   Allergen Reactions     Terbinafine Itching       Family History   Problem Relation Age of Onset     Heart Disease Father      Prostate Cancer Father      Diabetes Maternal Grandfather      Heart Disease Paternal Grandfather      Genetic Disorder Daughter         At Birth/ [place]     Social History     Socioeconomic History     Marital status:    Tobacco Use     Smoking status: Never Smoker     Smokeless tobacco: Never Used   Vaping Use     Vaping Use: Never used   Substance and Sexual Activity     Alcohol use: Yes     Comment: 3-4 drinks per month     Drug use: No     Sexual activity: Yes     Partners: Male       Additional medical/Social/Surgical histories reviewed in EPIC and updated as appropriate.     REVIEW OF SYSTEMS (5/5/2022)  10 point ROS of systems including Constitutional, Eyes, Respiratory, Cardiovascular, Gastroenterology, Genitourinary, Integumentary, Musculoskeletal, Psychiatric, Allergic/Immunologic were all negative except for pertinent positives noted in my HPI.     PHYSICAL EXAM  VSS  General  - normal appearance, in no obvious distress  HEENT  - conjunctivae not injected, moist mucous membranes, normocephalic/atraumatic head, ears normal appearance, no lesions, mouth normal appearance, no scars, normal dentition and teeth present  CV  - normal femoral pulse  Pulm  - normal respiratory pattern, non-labored  Musculoskeletal -right hip  - stance: normal gait without limp,  no obvious leg length discrepancy, normal heel and toe walk, single leg squat displays knee valgus, contralateral hip drop, internal rotation of the hip   - inspection: no swelling or effusion,  normal bone and joint alignment, no obvious deformity  - palpation: tender over the greater trochanter  -  ROM: pain with active abduction, normal and painless flexion, extension, IR, ER, adduction  - strength: 5/5 in all planes  - special tests:  (-) JEANETTE  (-) FADIR  no pain with axial femoral load  Neuro  - no sensory or motor deficit, grossly normal coordination, normal muscle tone  Skin  - no ecchymosis, erythema, warmth, or induration, no obvious rash  Psych  - interactive, appropriate, normal mood and affect  Low back: has some pain with extension, and slightly positive slr on right  ASSESSMENT & PLAN  47 yo female with lumbar radicular pain, right hip trochanteric bursitis    I independently reviewed the following imaging studies:  Lumbar xray: shows some ddd  RX given for medrol, celebrex, robaxin  Given HEP'  Consider lumbar MRI vs. Bursa injection PRn  F/u 1 monht  Appropriate PPE was utilized for prevention of spread of Covid-19.  Avelino Hamilton MD, CAQSM        Again, thank you for allowing me to participate in the care of your patient.        Sincerely,        Avelino Hamilton MD

## 2022-05-05 NOTE — PROGRESS NOTES
HISTORY OF PRESENT ILLNESS  Ms. Pradhan is a pleasant 46 year old year old female who presents to clinic today with   Treasure explains that she has had right hip pain on/off for the past several months getting more aggravating  Location: low back and right hip  Quality:  achy pain    Severity: 6/10 at worst    Duration: see above  Timing: occurs intermittently  Context: occurs while exercising and lifting  Modifying factors:  resting and non-use makes it better, movement and use makes it worse  Associated signs & symptoms: some low back pain  Additional history: as documented    MEDICAL HISTORY  Patient Active Problem List   Diagnosis     Migraine     Anxiety     Mild intermittent asthma without complication     Back pain     Moderate recurrent major depression (H)     Generalized anxiety disorder     Dyslipidemia     Onychomycosis     Therapeutic drug monitoring     Degeneration of thoracic or thoracolumbar intervertebral disc     DDD (degenerative disc disease), cervical     Migraine without aura and without status migrainosus, not intractable     Encounter for surveillance of other contraceptive     Pigmented skin lesion, midback, 3mm     Chronic allergic rhinitis     Skin lesion, left thigh     Chronic right hip pain     Benign essential hypertension     Obesity (BMI 35.0-39.9) with comorbidity (H)     Back muscle spasm     Recurrent cold sores     Chronic foot pain, right     S/P foot surgery, right     Irritable bowel syndrome with both constipation and diarrhea     Previous  delivery, delivered       Current Outpatient Medications   Medication Sig Dispense Refill     albuterol (PROAIR HFA) 108 (90 Base) MCG/ACT inhaler Inhale 2 puffs into the lungs every 6 hours as needed for shortness of breath / dyspnea or wheezing Profile Rx 18 g 1     buPROPion (WELLBUTRIN XL) 150 MG 24 hr tablet Take 1 tablet (150 mg) by mouth every morning Change in dose 90 tablet 1     citalopram (CELEXA) 20 MG tablet Take 1.5  tablets (30 mg) by mouth every evening Dose change 135 tablet 1     cyclobenzaprine (FLEXERIL) 10 MG tablet TAKE ONE TABLET BY MOUTH TWICE A DAY AS NEEDED 30 tablet 0     diclofenac (VOLTAREN) 1 % topical gel Place onto the skin 4 times daily (Patient taking differently: Place onto the skin 4 times daily as needed) 100 g 1     docusate sodium (COLACE) 50 MG capsule Take 50 mg by mouth as needed in the morning and 50 mg as needed in the evening.       etonogestrel-ethinyl estradiol (NUVARING) 0.12-0.015 MG/24HR vaginal ring PLACE 1 RING VAGINALLY EVERY 21 DAYS THEN REMOVE FOR 1 WEEK AND REPEAT 3 each 4     fexofenadine (ALLEGRA) 180 MG tablet Take 180 mg by mouth daily       fluconazole (DIFLUCAN) 150 MG tablet Take 2 tablets (300 mg) by mouth every 7 days 8 tablet 2     fluconazole (DIFLUCAN) 150 MG tablet Take 2 tablets (300 mg) by mouth every 7 days 8 tablet 2     ibuprofen (ADVIL/MOTRIN) 200 MG tablet Take 200-400 mg by mouth every 4 hours as needed for mild pain       lisinopril (ZESTRIL) 20 MG tablet Take 0.5 tablets (10 mg) by mouth in the morning. 45 tablet 3     MAGNESIUM PO Take 1 tablet by mouth daily       melatonin 5 MG tablet Take 5 mg by mouth At Bedtime       mometasone (NASONEX) 50 MCG/ACT nasal spray Spray 2 sprays into both nostrils daily as needed (Nasal allergies) Profile prescription 17 g 6     Multiple Vitamins-Minerals (MULTIVITAMIN ADULT PO) Take 1 tablet by mouth daily       nystatin (MYCOSTATIN) 174695 UNIT/GM POWD Use to squirt inside the socks once daily (Patient taking differently: Use to squirt inside the socks once daily PRN) 60 g 3     rizatriptan (MAXALT-MLT) 10 MG ODT Take 1 tablet (10 mg) by mouth See Admin Instructions WITH ONSET OF MIGRAINE, MAY REPEAT ONCE AFTER 2 HOURS. DO NOT EXCEED 3 TABLETS IN 24 HOURS. 6 tablet 6     valACYclovir (VALTREX) 500 MG tablet Take 1 tablet (500 mg) by mouth 2 times daily for 3 days 6 tablet 4       Allergies   Allergen Reactions     Terbinafine  Itching       Family History   Problem Relation Age of Onset     Heart Disease Father      Prostate Cancer Father      Diabetes Maternal Grandfather      Heart Disease Paternal Grandfather      Genetic Disorder Daughter         At Birth/ [place]     Social History     Socioeconomic History     Marital status:    Tobacco Use     Smoking status: Never Smoker     Smokeless tobacco: Never Used   Vaping Use     Vaping Use: Never used   Substance and Sexual Activity     Alcohol use: Yes     Comment: 3-4 drinks per month     Drug use: No     Sexual activity: Yes     Partners: Male       Additional medical/Social/Surgical histories reviewed in [facility] and updated as appropriate.     REVIEW OF SYSTEMS (5/5/2022)  10 point ROS of systems including Constitutional, Eyes, Respiratory, Cardiovascular, Gastroenterology, Genitourinary, Integumentary, Musculoskeletal, Psychiatric, Allergic/Immunologic were all negative except for pertinent positives noted in my HPI.     PHYSICAL EXAM  VSS  General  - normal appearance, in no obvious distress  HEENT  - conjunctivae not injected, moist mucous membranes, normocephalic/atraumatic head, ears normal appearance, no lesions, mouth normal appearance, no scars, normal dentition and teeth present  CV  - normal femoral pulse  Pulm  - normal respiratory pattern, non-labored  Musculoskeletal -right hip  - stance: normal gait without limp,  no obvious leg length discrepancy, normal heel and toe walk, single leg squat displays knee valgus, contralateral hip drop, internal rotation of the hip   - inspection: no swelling or effusion,  normal bone and joint alignment, no obvious deformity  - palpation: tender over the greater trochanter  - ROM: pain with active abduction, normal and painless flexion, extension, IR, ER, adduction  - strength: 5/5 in all planes  - special tests:  (-) JEANETTE  (-) FADIR  no pain with axial femoral load  Neuro  - no sensory or motor deficit, grossly normal coordination,  normal muscle tone  Skin  - no ecchymosis, erythema, warmth, or induration, no obvious rash  Psych  - interactive, appropriate, normal mood and affect  Low back: has some pain with extension, and slightly positive slr on right  ASSESSMENT & PLAN  47 yo female with lumbar radicular pain, right hip trochanteric bursitis    I independently reviewed the following imaging studies:  Lumbar xray: shows some ddd  RX given for medrol, celebrex, robaxin  Given HEP'  Consider lumbar MRI vs. Bursa injection PRn  F/u 1 monht  Appropriate PPE was utilized for prevention of spread of Covid-19.  Avelino Hamilton MD, CAQSM

## 2022-05-05 NOTE — NURSING NOTE
[facility]  5/5/2022    Treasure Pradhan's chief complaint for this visit includes:  Chief Complaint   Patient presents with     Consult     Right sided hip pain     PCP: Nelida Celestin    Referring Provider:  Nelida Celestin MD  [address] [place] [address]    There were no vitals taken for this visit.  Data Unavailable       Reason for visit:     What part of your body is injured / painful?  right hip    What caused the injury /pain? No inciting event     How long ago did your injury occur or pain begin? several years ago    What are your most bothersome symptoms? Pain    How would you characterize your symptom?  aching, sharp and burning    What makes your symptoms better? Nothing    What makes your symptoms worse? Movement, sitting for too long    Have you been previously seen for this problem? Yes, saw her PCP and she did PT    Medical History:    Any recent changes to your medical history? No    Any new medication prescribed since last visit? No    Have you had surgery on this body part before? No    Social History:    Occupation: Pharmacist    Handedness: Right

## 2022-05-15 ENCOUNTER — LAB (OUTPATIENT)
Dept: LAB | Facility: CLINIC | Age: 47
End: 2022-05-15
Payer: COMMERCIAL

## 2022-05-15 DIAGNOSIS — Z11.59 ENCOUNTER FOR SCREENING FOR OTHER VIRAL DISEASES: ICD-10-CM

## 2022-05-15 LAB — SARS-COV-2 RNA RESP QL NAA+PROBE: NEGATIVE

## 2022-05-15 PROCEDURE — U0003 INFECTIOUS AGENT DETECTION BY NUCLEIC ACID (DNA OR RNA); SEVERE ACUTE RESPIRATORY SYNDROME CORONAVIRUS 2 (SARS-COV-2) (CORONAVIRUS DISEASE [COVID-19]), AMPLIFIED PROBE TECHNIQUE, MAKING USE OF HIGH THROUGHPUT TECHNOLOGIES AS DESCRIBED BY CMS-2020-01-R: HCPCS

## 2022-05-15 PROCEDURE — U0005 INFEC AGEN DETEC AMPLI PROBE: HCPCS

## 2022-05-19 ENCOUNTER — HOSPITAL ENCOUNTER (OUTPATIENT)
Facility: AMBULATORY SURGERY CENTER | Age: 47
Discharge: HOME | End: 2022-05-19
Attending: SURGERY | Admitting: SURGERY
Payer: COMMERCIAL

## 2022-05-19 VITALS
RESPIRATION RATE: 16 BRPM | WEIGHT: 237 LBS | OXYGEN SATURATION: 99 % | SYSTOLIC BLOOD PRESSURE: 123 MMHG | HEART RATE: 69 BPM | DIASTOLIC BLOOD PRESSURE: 79 MMHG | TEMPERATURE: 97.9 F | BODY MASS INDEX: 35.92 KG/M2 | HEIGHT: 68 IN

## 2022-05-19 LAB — COLONOSCOPY: NORMAL

## 2022-05-19 PROCEDURE — 88305 TISSUE EXAM BY PATHOLOGIST: CPT | Performed by: PATHOLOGY

## 2022-05-19 PROCEDURE — 99153 MOD SED SAME PHYS/QHP EA: CPT | Mod: 59 | Performed by: SURGERY

## 2022-05-19 PROCEDURE — 45385 COLONOSCOPY W/LESION REMOVAL: CPT

## 2022-05-19 PROCEDURE — G8918 PT W/O PREOP ORDER IV AB PRO: HCPCS

## 2022-05-19 PROCEDURE — 45385 COLONOSCOPY W/LESION REMOVAL: CPT | Mod: PT | Performed by: SURGERY

## 2022-05-19 PROCEDURE — G8907 PT DOC NO EVENTS ON DISCHARG: HCPCS

## 2022-05-19 PROCEDURE — 99152 MOD SED SAME PHYS/QHP 5/>YRS: CPT | Mod: 59 | Performed by: SURGERY

## 2022-05-19 RX ORDER — NALOXONE HYDROCHLORIDE 0.4 MG/ML
0.2 INJECTION, SOLUTION INTRAMUSCULAR; INTRAVENOUS; SUBCUTANEOUS
Status: DISCONTINUED | OUTPATIENT
Start: 2022-05-19 | End: 2022-05-20 | Stop reason: HOSPADM

## 2022-05-19 RX ORDER — ONDANSETRON 4 MG/1
4 TABLET, ORALLY DISINTEGRATING ORAL EVERY 6 HOURS PRN
Status: DISCONTINUED | OUTPATIENT
Start: 2022-05-19 | End: 2022-05-20 | Stop reason: HOSPADM

## 2022-05-19 RX ORDER — NALOXONE HYDROCHLORIDE 0.4 MG/ML
0.4 INJECTION, SOLUTION INTRAMUSCULAR; INTRAVENOUS; SUBCUTANEOUS
Status: DISCONTINUED | OUTPATIENT
Start: 2022-05-19 | End: 2022-05-20 | Stop reason: HOSPADM

## 2022-05-19 RX ORDER — FENTANYL CITRATE 50 UG/ML
INJECTION, SOLUTION INTRAMUSCULAR; INTRAVENOUS PRN
Status: DISCONTINUED | OUTPATIENT
Start: 2022-05-19 | End: 2022-05-19 | Stop reason: HOSPADM

## 2022-05-19 RX ORDER — LIDOCAINE 40 MG/G
CREAM TOPICAL
Status: DISCONTINUED | OUTPATIENT
Start: 2022-05-19 | End: 2022-05-20 | Stop reason: HOSPADM

## 2022-05-19 RX ORDER — ONDANSETRON 2 MG/ML
4 INJECTION INTRAMUSCULAR; INTRAVENOUS EVERY 6 HOURS PRN
Status: DISCONTINUED | OUTPATIENT
Start: 2022-05-19 | End: 2022-05-20 | Stop reason: HOSPADM

## 2022-05-19 RX ORDER — FLUMAZENIL 0.1 MG/ML
0.2 INJECTION, SOLUTION INTRAVENOUS
Status: ACTIVE | OUTPATIENT
Start: 2022-05-19 | End: 2022-05-19

## 2022-05-19 RX ORDER — PROCHLORPERAZINE MALEATE 10 MG
10 TABLET ORAL EVERY 6 HOURS PRN
Status: DISCONTINUED | OUTPATIENT
Start: 2022-05-19 | End: 2022-05-20 | Stop reason: HOSPADM

## 2022-05-19 NOTE — H&P
ENDOSCOPY PRE-SEDATION H&P FOR OUTPATIENT PROCEDURES    Treasure Pradhan  [id number]  1975    Procedure:   Colonoscopy possible biopsy, possible polypectomy, with moderate sedation.     Pre-procedure diagnosis: screen    Past medical history:   Past Medical History:   Diagnosis Date     Allergic rhinitis      Asthma, intermittent      Back pain      Depression with anxiety      Essential hypertension 10/29/2019     Hyperlipidemia LDL goal < 160      Migraine        Past surgical history:   Past Surgical History:   Procedure Laterality Date     SURGICAL HISTORY OF -   2007         SURGICAL HISTORY OF -   3/2006    Rt Foot mass - Perineurioma       Current Outpatient Medications   Medication     albuterol (PROAIR HFA) 108 (90 Base) MCG/ACT inhaler     buPROPion (WELLBUTRIN XL) 150 MG 24 hr tablet     celecoxib (CELEBREX) 100 MG capsule     citalopram (CELEXA) 20 MG tablet     docusate sodium (COLACE) 50 MG capsule     etonogestrel-ethinyl estradiol (NUVARING) 0.12-0.015 MG/24HR vaginal ring     ibuprofen (ADVIL/MOTRIN) 200 MG tablet     lisinopril (ZESTRIL) 20 MG tablet     MAGNESIUM PO     methocarbamol (ROBAXIN) 500 MG tablet     mometasone (NASONEX) 50 MCG/ACT nasal spray     Multiple Vitamins-Minerals (MULTIVITAMIN ADULT PO)     rizatriptan (MAXALT-MLT) 10 MG ODT     valACYclovir (VALTREX) 500 MG tablet     diclofenac (VOLTAREN) 1 % topical gel     fluconazole (DIFLUCAN) 150 MG tablet     nystatin (MYCOSTATIN) 522826 UNIT/GM POWD     Current Facility-Administered Medications   Medication     lidocaine (LMX4) kit     lidocaine 1 % 0.1-1 mL     sodium chloride (PF) 0.9% PF flush 3 mL     sodium chloride (PF) 0.9% PF flush 3 mL       Allergies   Allergen Reactions     Terbinafine Itching       History of Anesthesia/Sedation Problems: no    Physical Exam:    Mental status: alert  Heart: Normal  Lung: Normal  Assessment of patient's airway: Normal  Other as pertinent for procedure: None     ASA  Score: See Provation note    Mallampati score:  II - Faucial pillars and soft palate may be seen, but uvula is masked by the base of the tongue    Assessment/Plan:     The patient is an appropriate candidate to receive sedation.    Informed consent was discussed with the patient/family, including the risks, benefits, potential complications and any alternative options associated with sedation.    Patient assessment completed just prior to sedation and while under constant observation by the provider. Condition determined to be adequate for proceeding with sedation.    The specific risks for the procedure were discussed with the patient at the time of informed consent and include but are not limited to perforation which could require surgery, missing significant neoplasm or lesion, hemorrhage and adverse sedative complication.      Miky Castillo MD

## 2022-05-23 LAB
PATH REPORT.COMMENTS IMP SPEC: NORMAL
PATH REPORT.COMMENTS IMP SPEC: NORMAL
PATH REPORT.FINAL DX SPEC: NORMAL
PATH REPORT.GROSS SPEC: NORMAL
PATH REPORT.MICROSCOPIC SPEC OTHER STN: NORMAL
PATH REPORT.RELEVANT HX SPEC: NORMAL
PHOTO IMAGE: NORMAL

## 2022-08-24 ENCOUNTER — LAB (OUTPATIENT)
Dept: LAB | Facility: CLINIC | Age: 47
End: 2022-08-24
Payer: COMMERCIAL

## 2022-08-24 DIAGNOSIS — Z13.0 SCREENING FOR DEFICIENCY ANEMIA: ICD-10-CM

## 2022-08-24 DIAGNOSIS — Z13.220 SCREENING FOR HYPERLIPIDEMIA: ICD-10-CM

## 2022-08-24 DIAGNOSIS — Z13.1 SCREENING FOR DIABETES MELLITUS: ICD-10-CM

## 2022-08-24 DIAGNOSIS — I10 BENIGN ESSENTIAL HYPERTENSION: ICD-10-CM

## 2022-08-24 LAB
ALBUMIN SERPL-MCNC: 3.2 G/DL (ref 3.4–5)
ALP SERPL-CCNC: 64 U/L (ref 40–150)
ALT SERPL W P-5'-P-CCNC: 28 U/L (ref 0–50)
ANION GAP SERPL CALCULATED.3IONS-SCNC: 3 MMOL/L (ref 3–14)
AST SERPL W P-5'-P-CCNC: 18 U/L (ref 0–45)
BILIRUB SERPL-MCNC: 0.2 MG/DL (ref 0.2–1.3)
BUN SERPL-MCNC: 11 MG/DL (ref 7–30)
CALCIUM SERPL-MCNC: 9.3 MG/DL (ref 8.5–10.1)
CHLORIDE BLD-SCNC: 112 MMOL/L (ref 94–109)
CHOLEST SERPL-MCNC: 193 MG/DL
CO2 SERPL-SCNC: 26 MMOL/L (ref 20–32)
CREAT SERPL-MCNC: 0.91 MG/DL (ref 0.52–1.04)
CREAT UR-MCNC: 73 MG/DL
ERYTHROCYTE [DISTWIDTH] IN BLOOD BY AUTOMATED COUNT: 13.5 % (ref 10–15)
FASTING STATUS PATIENT QL REPORTED: YES
GFR SERPL CREATININE-BSD FRML MDRD: 79 ML/MIN/1.73M2
GLUCOSE BLD-MCNC: 95 MG/DL (ref 70–99)
HCT VFR BLD AUTO: 40.9 % (ref 35–47)
HDLC SERPL-MCNC: 49 MG/DL
HGB BLD-MCNC: 13.2 G/DL (ref 11.7–15.7)
LDLC SERPL CALC-MCNC: 121 MG/DL
MCH RBC QN AUTO: 31 PG (ref 26.5–33)
MCHC RBC AUTO-ENTMCNC: 32.3 G/DL (ref 31.5–36.5)
MCV RBC AUTO: 96 FL (ref 78–100)
MICROALBUMIN UR-MCNC: <5 MG/L
MICROALBUMIN/CREAT UR: NORMAL MG/G{CREAT}
NONHDLC SERPL-MCNC: 144 MG/DL
PLATELET # BLD AUTO: 333 10E3/UL (ref 150–450)
POTASSIUM BLD-SCNC: 4.6 MMOL/L (ref 3.4–5.3)
PROT SERPL-MCNC: 6.8 G/DL (ref 6.8–8.8)
RBC # BLD AUTO: 4.26 10E6/UL (ref 3.8–5.2)
SODIUM SERPL-SCNC: 141 MMOL/L (ref 133–144)
TRIGL SERPL-MCNC: 114 MG/DL
WBC # BLD AUTO: 5.8 10E3/UL (ref 4–11)

## 2022-08-24 PROCEDURE — 80053 COMPREHEN METABOLIC PANEL: CPT | Performed by: FAMILY MEDICINE

## 2022-08-24 PROCEDURE — 80061 LIPID PANEL: CPT | Performed by: FAMILY MEDICINE

## 2022-08-24 PROCEDURE — 85027 COMPLETE CBC AUTOMATED: CPT | Performed by: FAMILY MEDICINE

## 2022-08-24 PROCEDURE — 82043 UR ALBUMIN QUANTITATIVE: CPT | Performed by: FAMILY MEDICINE

## 2022-08-24 PROCEDURE — 36415 COLL VENOUS BLD VENIPUNCTURE: CPT | Performed by: FAMILY MEDICINE

## 2022-09-09 ENCOUNTER — TELEPHONE (OUTPATIENT)
Dept: FAMILY MEDICINE | Facility: CLINIC | Age: 47
End: 2022-09-09

## 2022-09-09 NOTE — TELEPHONE ENCOUNTER
Called pt and LVM. She is scheduled for her physical on 9/14 but last was 9/29. Worried that her insurance will not cover the visit as they usually want the pt to go a full year in between physical appts. Asked that she call her insurance to find out.

## 2022-09-11 ASSESSMENT — ENCOUNTER SYMPTOMS
PARESTHESIAS: 0
BREAST MASS: 0
HEARTBURN: 0
CHILLS: 0
WEAKNESS: 0
ARTHRALGIAS: 0
PALPITATIONS: 0
DIZZINESS: 0
FREQUENCY: 0
HEMATOCHEZIA: 0
SHORTNESS OF BREATH: 0
CONSTIPATION: 0
DIARRHEA: 0
JOINT SWELLING: 0
MYALGIAS: 0
EYE PAIN: 0
ABDOMINAL PAIN: 0
NERVOUS/ANXIOUS: 0
DYSURIA: 0
FEVER: 0
COUGH: 0
NAUSEA: 0
HEADACHES: 0
SORE THROAT: 0
HEMATURIA: 0

## 2022-09-11 ASSESSMENT — ASTHMA QUESTIONNAIRES

## 2022-09-14 ENCOUNTER — OFFICE VISIT (OUTPATIENT)
Dept: FAMILY MEDICINE | Facility: CLINIC | Age: 47
End: 2022-09-14
Payer: COMMERCIAL

## 2022-09-14 VITALS
HEIGHT: 68 IN | TEMPERATURE: 98.6 F | BODY MASS INDEX: 36.92 KG/M2 | WEIGHT: 243.6 LBS | HEART RATE: 78 BPM | RESPIRATION RATE: 18 BRPM | DIASTOLIC BLOOD PRESSURE: 83 MMHG | SYSTOLIC BLOOD PRESSURE: 122 MMHG | OXYGEN SATURATION: 99 %

## 2022-09-14 DIAGNOSIS — F33.1 MODERATE RECURRENT MAJOR DEPRESSION (H): ICD-10-CM

## 2022-09-14 DIAGNOSIS — M54.2 CHRONIC NECK AND BACK PAIN: ICD-10-CM

## 2022-09-14 DIAGNOSIS — I10 BENIGN ESSENTIAL HYPERTENSION: ICD-10-CM

## 2022-09-14 DIAGNOSIS — G43.709 CHRONIC MIGRAINE WITHOUT AURA WITHOUT STATUS MIGRAINOSUS, NOT INTRACTABLE: ICD-10-CM

## 2022-09-14 DIAGNOSIS — G89.29 CHRONIC NECK AND BACK PAIN: ICD-10-CM

## 2022-09-14 DIAGNOSIS — Z00.00 ROUTINE GENERAL MEDICAL EXAMINATION AT A HEALTH CARE FACILITY: Primary | ICD-10-CM

## 2022-09-14 DIAGNOSIS — M54.9 CHRONIC NECK AND BACK PAIN: ICD-10-CM

## 2022-09-14 DIAGNOSIS — J30.9 CHRONIC ALLERGIC RHINITIS: ICD-10-CM

## 2022-09-14 DIAGNOSIS — E66.01 MORBID OBESITY (H): ICD-10-CM

## 2022-09-14 DIAGNOSIS — F41.9 ANXIETY: ICD-10-CM

## 2022-09-14 DIAGNOSIS — Z30.49 ENCOUNTER FOR SURVEILLANCE OF OTHER CONTRACEPTIVE: ICD-10-CM

## 2022-09-14 DIAGNOSIS — Z12.4 CERVICAL CANCER SCREENING: ICD-10-CM

## 2022-09-14 DIAGNOSIS — B00.1 RECURRENT COLD SORES: ICD-10-CM

## 2022-09-14 DIAGNOSIS — J45.20 MILD INTERMITTENT ASTHMA WITHOUT COMPLICATION: ICD-10-CM

## 2022-09-14 DIAGNOSIS — Z12.31 ENCOUNTER FOR SCREENING MAMMOGRAM FOR MALIGNANT NEOPLASM OF BREAST: ICD-10-CM

## 2022-09-14 PROCEDURE — 99396 PREV VISIT EST AGE 40-64: CPT | Mod: 25 | Performed by: FAMILY MEDICINE

## 2022-09-14 PROCEDURE — 90471 IMMUNIZATION ADMIN: CPT | Performed by: FAMILY MEDICINE

## 2022-09-14 PROCEDURE — 90686 IIV4 VACC NO PRSV 0.5 ML IM: CPT | Performed by: FAMILY MEDICINE

## 2022-09-14 PROCEDURE — 99214 OFFICE O/P EST MOD 30 MIN: CPT | Mod: 25 | Performed by: FAMILY MEDICINE

## 2022-09-14 PROCEDURE — 96127 BRIEF EMOTIONAL/BEHAV ASSMT: CPT | Performed by: FAMILY MEDICINE

## 2022-09-14 RX ORDER — RIZATRIPTAN BENZOATE 10 MG/1
10 TABLET, ORALLY DISINTEGRATING ORAL SEE ADMIN INSTRUCTIONS
Qty: 6 TABLET | Refills: 6 | Status: SHIPPED | OUTPATIENT
Start: 2022-09-14 | End: 2023-03-29

## 2022-09-14 RX ORDER — ALBUTEROL SULFATE 90 UG/1
2 AEROSOL, METERED RESPIRATORY (INHALATION) EVERY 6 HOURS PRN
Qty: 18 G | Refills: 1 | Status: SHIPPED | OUTPATIENT
Start: 2022-09-14 | End: 2023-03-29

## 2022-09-14 RX ORDER — BUPROPION HYDROCHLORIDE 150 MG/1
150 TABLET ORAL EVERY MORNING
Qty: 90 TABLET | Refills: 1 | Status: SHIPPED | OUTPATIENT
Start: 2022-09-14 | End: 2023-03-29

## 2022-09-14 RX ORDER — LISINOPRIL 10 MG/1
10 TABLET ORAL DAILY
Qty: 90 TABLET | Refills: 3 | Status: SHIPPED | OUTPATIENT
Start: 2022-09-14 | End: 2023-03-29

## 2022-09-14 RX ORDER — CITALOPRAM HYDROBROMIDE 20 MG/1
30 TABLET ORAL EVERY EVENING
Qty: 135 TABLET | Refills: 1 | Status: SHIPPED | OUTPATIENT
Start: 2022-09-14 | End: 2023-03-29

## 2022-09-14 RX ORDER — MOMETASONE FUROATE MONOHYDRATE 50 UG/1
2 SPRAY, METERED NASAL DAILY PRN
Qty: 17 G | Refills: 6 | Status: SHIPPED | OUTPATIENT
Start: 2022-09-14 | End: 2023-03-29

## 2022-09-14 RX ORDER — ETONOGESTREL AND ETHINYL ESTRADIOL VAGINAL RING .015; .12 MG/D; MG/D
RING VAGINAL
Qty: 3 EACH | Refills: 4 | Status: SHIPPED | OUTPATIENT
Start: 2022-09-14 | End: 2023-09-25

## 2022-09-14 RX ORDER — LISINOPRIL 20 MG/1
10 TABLET ORAL DAILY
Qty: 45 TABLET | Refills: 3 | Status: CANCELLED | OUTPATIENT
Start: 2022-09-14

## 2022-09-14 RX ORDER — VALACYCLOVIR HYDROCHLORIDE 500 MG/1
500 TABLET, FILM COATED ORAL 2 TIMES DAILY
Qty: 6 TABLET | Refills: 4 | Status: SHIPPED | OUTPATIENT
Start: 2022-09-14 | End: 2023-03-29

## 2022-09-14 ASSESSMENT — ENCOUNTER SYMPTOMS
HEARTBURN: 0
BREAST MASS: 0
NAUSEA: 0
SHORTNESS OF BREATH: 0
DYSURIA: 0
CHILLS: 0
FEVER: 0
PARESTHESIAS: 0
SORE THROAT: 0
ARTHRALGIAS: 0
MYALGIAS: 0
HEMATOCHEZIA: 0
HEMATURIA: 0
PALPITATIONS: 0
EYE PAIN: 0
CONSTIPATION: 0
FREQUENCY: 0
NERVOUS/ANXIOUS: 0
JOINT SWELLING: 0
DIARRHEA: 0
DIZZINESS: 0
WEAKNESS: 0
ABDOMINAL PAIN: 0
HEADACHES: 0
COUGH: 0

## 2022-09-14 ASSESSMENT — PAIN SCALES - GENERAL: PAINLEVEL: NO PAIN (0)

## 2022-09-14 NOTE — PROGRESS NOTES
SUBJECTIVE:   CC: Treasure is an 46 year old who presents for preventive health visit.     {  Patient has been advised of split billing requirements and indicates understanding: Yes  Healthy Habits:     Getting at least 3 servings of Calcium per day:  Yes    Bi-annual eye exam:  NO    Dental care twice a year:  NO    Sleep apnea or symptoms of sleep apnea:  Daytime drowsiness    Diet:  Regular (no restrictions)    Frequency of exercise:  1 day/week    Duration of exercise:  15-30 minutes    Taking medications regularly:  Yes    Medication side effects:  None    PHQ-2 Total Score: 2    Additional concerns today:  No        Hypertension Follow-up      Do you check your blood pressure regularly outside of the clinic? No     Are you following a low salt diet? Yes    Are your blood pressures ever more than 140 on the top number (systolic) OR more   than 90 on the bottom number (diastolic), for example 140/90?  N/A    Depression and Anxiety Follow-Up    How are you doing with your depression since your last visit? No change    How are you doing with your anxiety since your last visit?  No change    Are you having other symptoms that might be associated with depression or anxiety? No    Have you had a significant life event? No     Do you have any concerns with your use of alcohol or other drugs? No    Social History     Tobacco Use     Smoking status: Never Smoker     Smokeless tobacco: Never Used   Vaping Use     Vaping Use: Never used   Substance Use Topics     Alcohol use: Yes     Comment: 3-4 drinks per month     Drug use: Never     PHQ 9/29/2021 4/5/2022 9/14/2022   PHQ-9 Total Score 9 11 9   Q9: Thoughts of better off dead/self-harm past 2 weeks Not at all Not at all Not at all     NATANAEL-7 SCORE 9/22/2020 9/29/2021 4/5/2022   Total Score - - -   Total Score - 8 (mild anxiety) 7 (mild anxiety)   Total Score 9 8 7     Last PHQ-9 9/14/2022   1.  Little interest or pleasure in doing things 1   2.  Feeling down,  depressed, or hopeless 1   3.  Trouble falling or staying asleep, or sleeping too much 2   4.  Feeling tired or having little energy 2   5.  Poor appetite or overeating 1   6.  Feeling bad about yourself 0   7.  Trouble concentrating 1   8.  Moving slowly or restless 1   Q9: Thoughts of better off dead/self-harm past 2 weeks 0   PHQ-9 Total Score 9   Difficulty at work, home, or with people -     NATANAEL-7  4/5/2022   1. Feeling nervous, anxious, or on edge 1   2. Not being able to stop or control worrying 1   3. Worrying too much about different things 1   4. Trouble relaxing 2   5. Being so restless that it is hard to sit still 0   6. Becoming easily annoyed or irritable 1   7. Feeling afraid, as if something awful might happen 1   NATANAEL-7 Total Score 7   If you checked any problems, how difficult have they made it for you to do your work, take care of things at home, or get along with other people? -       Suicide Assessment Five-step Evaluation and Treatment (SAFE-T)    Asthma Follow-Up    Was ACT completed today?    Yes    ACT Total Scores 9/11/2022   ACT TOTAL SCORE -   ASTHMA ER VISITS -   ASTHMA HOSPITALIZATIONS -   ACT TOTAL SCORE (Goal Greater than or Equal to 20) 24   In the past 12 months, how many times did you visit the emergency room for your asthma without being admitted to the hospital? 0   In the past 12 months, how many times were you hospitalized overnight because of your asthma? 0          How many days per week do you miss taking your asthma controller medication?  I do not have an asthma controller medication    Please describe any recent triggers for your asthma: upper respiratory infections and pollens    Have you had any Emergency Room Visits, Urgent Care Visits, or Hospital Admissions since your last office visit?  No      Today's PHQ-2 Score:   PHQ-2 ( 1999 [employer]) 9/11/2022   Q1: Little interest or pleasure in doing things 2   Q2: Feeling down, depressed or hopeless 0   PHQ-2 Score 2   PHQ-2  Total Score (12-17 Years)- Positive if 3 or more points; Administer PHQ-A if positive -   Q1: Little interest or pleasure in doing things More than half the days   Q2: Feeling down, depressed or hopeless Not at all   PHQ-2 Score 2       Abuse: Current or Past (Physical, Sexual or Emotional) - No  Do you feel safe in your environment? Yes    Have you ever done Advance Care Planning? (For example, a Health Directive, POLST, or a discussion with a medical provider or your loved ones about your wishes): No, advance care planning information given to patient to review.  Patient plans to discuss their wishes with loved ones or provider.      Social History     Tobacco Use     Smoking status: Never Smoker     Smokeless tobacco: Never Used   Substance Use Topics     Alcohol use: Yes     Comment: 3-4 drinks per month     If you drink alcohol do you typically have >3 drinks per day or >7 drinks per week? No    No flowsheet data found.    Reviewed orders with patient.  Reviewed health maintenance and updated orders accordingly - Yes  Lab work is in process  Labs reviewed in EPIC  BP Readings from Last 3 Encounters:   09/14/22 122/83   05/19/22 (P) 116/75   02/24/22 122/80    Wt Readings from Last 3 Encounters:   09/14/22 110.5 kg (243 lb 9.6 oz)   05/16/22 107.5 kg (237 lb)   02/24/22 107 kg (236 lb)                  Patient Active Problem List   Diagnosis     Migraine     Anxiety     Mild intermittent asthma without complication     Back pain     Moderate recurrent major depression (H)     Generalized anxiety disorder     Dyslipidemia     Onychomycosis     Therapeutic drug monitoring     Degeneration of thoracic or thoracolumbar intervertebral disc     DDD (degenerative disc disease), cervical     Migraine without aura and without status migrainosus, not intractable     Encounter for surveillance of other contraceptive     Pigmented skin lesion, midback, 3mm     Chronic allergic rhinitis     Skin lesion, left thigh      Chronic right hip pain     Benign essential hypertension     Obesity (BMI 35.0-39.9) with comorbidity (H)     Back muscle spasm     Recurrent cold sores     Chronic neck and back pain     S/P foot surgery, right     Irritable bowel syndrome with both constipation and diarrhea     Previous  delivery, delivered     Past Surgical History:   Procedure Laterality Date     BIOPSY  2005    Foot     COLONOSCOPY N/A 2022    Procedure: COLONOSCOPY, FLEXIBLE, WITH LESION REMOVAL USING SNARE;  Surgeon: Miky Castillo MD;  Location: [facility]     COLONOSCOPY WITH CO2 INSUFFLATION N/A 2022    Procedure: COLONOSCOPY, WITH CO2 INSUFFLATION;  Surgeon: Miky Castillo MD;  Location: [facility] OR     EYE SURGERY  2017    Lasik     SURGICAL HISTORY OF -   2007         SURGICAL HISTORY OF -   2006    Rt Foot mass - Perineurioma       Social History     Tobacco Use     Smoking status: Never Smoker     Smokeless tobacco: Never Used   Substance Use Topics     Alcohol use: Yes     Comment: 3-4 drinks per month     Family History   Problem Relation Age of Onset     Heart Disease Father      Prostate Cancer Father      Coronary Artery Disease Father      Diabetes Maternal Grandfather      Heart Disease Paternal Grandfather      Genetic Disorder Daughter         At Birth/ [place]     Genetic Disorder Daughter          Current Outpatient Medications   Medication Sig Dispense Refill     albuterol (PROAIR HFA) 108 (90 Base) MCG/ACT inhaler Inhale 2 puffs into the lungs every 6 hours as needed for shortness of breath / dyspnea or wheezing Profile Rx 18 g 1     buPROPion (WELLBUTRIN XL) 150 MG 24 hr tablet Take 1 tablet (150 mg) by mouth every morning Change in dose 90 tablet 1     celecoxib (CELEBREX) 100 MG capsule Take 1 capsule (100 mg) by mouth 2 times daily as needed for moderate pain 60 capsule 1     citalopram (CELEXA) 20 MG tablet Take 1.5 tablets (30 mg) by mouth every evening Dose change  135 tablet 1     diclofenac (VOLTAREN) 1 % topical gel Place onto the skin 4 times daily (Patient taking differently: Place onto the skin 4 times daily as needed) 100 g 1     docusate sodium (COLACE) 50 MG capsule Take 50 mg by mouth as needed in the morning and 50 mg as needed in the evening.       etonogestrel-ethinyl estradiol (NUVARING) 0.12-0.015 MG/24HR vaginal ring PLACE 1 RING VAGINALLY EVERY 21 DAYS THEN REMOVE FOR 1 WEEK AND REPEAT 3 each 4     fluconazole (DIFLUCAN) 150 MG tablet Take 2 tablets (300 mg) by mouth every 7 days 8 tablet 2     ibuprofen (ADVIL/MOTRIN) 200 MG tablet Take 200-400 mg by mouth every 4 hours as needed for mild pain       lisinopril (ZESTRIL) 10 MG tablet Take 1 tablet (10 mg) by mouth daily 90 tablet 3     MAGNESIUM PO Take 1 tablet by mouth daily       methocarbamol (ROBAXIN) 500 MG tablet Take 1-2 tablets (500-1,000 mg) by mouth nightly as needed for muscle spasms 60 tablet 0     mometasone (NASONEX) 50 MCG/ACT nasal spray Spray 2 sprays into both nostrils daily as needed (Nasal allergies) Profile prescription 17 g 6     Multiple Vitamins-Minerals (MULTIVITAMIN ADULT PO) Take 1 tablet by mouth daily       nystatin (MYCOSTATIN) 693087 UNIT/GM POWD Use to squirt inside the socks once daily (Patient taking differently: Use to squirt inside the socks once daily PRN) 60 g 3     rizatriptan (MAXALT-MLT) 10 MG ODT Take 1 tablet (10 mg) by mouth See Admin Instructions WITH ONSET OF MIGRAINE, MAY REPEAT ONCE AFTER 2 HOURS. DO NOT EXCEED 3 TABLETS IN 24 HOURS. 6 tablet 6     valACYclovir (VALTREX) 500 MG tablet Take 1 tablet (500 mg) by mouth 2 times daily 6 tablet 4     Allergies   Allergen Reactions     Terbinafine Itching     Recent Labs   Lab Test 08/24/22  0957 02/24/22  0708 10/21/21  0756 03/22/21  0719 02/05/20  1717 11/20/19  1733 11/20/19  1733 03/07/19  0944   *  --   --  118*  --   --   --  126*   HDL 49*  --   --  51  --   --   --  62   TRIG 114  --   --  174*  --   --    --  102   ALT 28 34 39 29  --   --   --   --    CR 0.91  --   --  0.88 0.79   < > 0.69  --    GFRESTIMATED 79  --   --  80 >90   < > >90  --    GFRESTBLACK  --   --   --  >90 >90   < > >90  --    POTASSIUM 4.6  --   --  4.0 4.1   < > 3.8  --    TSH  --   --   --   --   --   --  0.94  --     < > = values in this interval not displayed.        Breast Cancer Screening:    FHS-7:   Breast CA Risk Assessment (FHS-7) 9/22/2021 11/17/2021 9/11/2022   Did any of your first-degree relatives have breast or ovarian cancer? No No No   Did any of your relatives have bilateral breast cancer? Yes No Yes   Did any man in your family have breast cancer? No No No   Did any woman in your family have breast and ovarian cancer? Yes No No   Did any woman in your family have breast cancer before age 50 y? Yes Yes Yes   Do you have 2 or more relatives with breast and/or ovarian cancer? No No No   Do you have 2 or more relatives with breast and/or bowel cancer? No No No     click delete button to remove this line now  Mammogram Screening: Recommended annual mammography  Pertinent mammograms are reviewed under the imaging tab.    History of abnormal Pap smear: NO - age 30-65 PAP every 5 years with negative HPV co-testing recommended  PAP / HPV Latest Ref Rng & Units 9/29/2021 2/23/2018 6/3/2015   PAP   Negative for Intraepithelial Lesion or Malignancy (NILM) - -   PAP (Historical) - - NIL NIL   HPV16 Negative Negative Negative -   HPV18 Negative Negative Negative -   HRHPV Negative Negative Negative -     Reviewed and updated as needed this visit by clinical staff   Tobacco  Allergies  Meds                Reviewed and updated as needed this visit by Provider                     Past Medical History:   Diagnosis Date     Allergic rhinitis      Asthma, intermittent      Back pain      Depression with anxiety      Depressive disorder      Essential hypertension 10/29/2019     Hyperlipidemia LDL goal < 160      Migraine       Past Surgical  History:   Procedure Laterality Date     BIOPSY  2005    Foot     COLONOSCOPY N/A 2022    Procedure: COLONOSCOPY, FLEXIBLE, WITH LESION REMOVAL USING SNARE;  Surgeon: Miky Castillo MD;  Location: [facility]     COLONOSCOPY WITH CO2 INSUFFLATION N/A 2022    Procedure: COLONOSCOPY, WITH CO2 INSUFFLATION;  Surgeon: Miky Castillo MD;  Location: [facility]     EYE SURGERY  2017    Lasik     SURGICAL HISTORY OF -   2007         SURGICAL HISTORY OF -   2006    Rt Foot mass - Perineurioma     OB History    Para Term  AB Living   2 2 2 0 0 2   SAB IAB Ectopic Multiple Live Births   0 0 0 0 2      # Outcome Date GA Lbr Presley/2nd Weight Sex Delivery Anes PTL Lv   2 Term      -SEC   INDU   1 Term      -SEC   INDU       Review of Systems   Constitutional: Negative for chills and fever.   HENT: Negative for congestion, ear pain, hearing loss and sore throat.    Eyes: Negative for pain and visual disturbance.   Respiratory: Negative for cough and shortness of breath.    Cardiovascular: Negative for chest pain, palpitations and peripheral edema.   Gastrointestinal: Negative for abdominal pain, constipation, diarrhea, heartburn, hematochezia and nausea.   Breasts:  Negative for tenderness, breast mass and discharge.   Genitourinary: Negative for dysuria, frequency, genital sores, hematuria, pelvic pain, urgency, vaginal bleeding and vaginal discharge.   Musculoskeletal: Negative for arthralgias, joint swelling and myalgias.   Skin: Negative for rash.   Neurological: Negative for dizziness, weakness, headaches and paresthesias.   Psychiatric/Behavioral: Negative for mood changes. The patient is not nervous/anxious.      CONSTITUTIONAL: NEGATIVE for fever, chills, change in weight  INTEGUMENTARU/SKIN: NEGATIVE for worrisome rashes, moles or lesions  EYES: NEGATIVE for vision changes or irritation  ENT: NEGATIVE for ear, mouth and throat problems  ENT: History of  "allergies  RESP: NEGATIVE for significant cough or SOB  RESP: History of asthma  BREAST: NEGATIVE for masses, tenderness or discharge  CV: NEGATIVE for chest pain, palpitations or peripheral edema  CV: History of hypertension  GI: NEGATIVE for nausea, abdominal pain, heartburn, or change in bowel habits  : NEGATIVE for unusual urinary or vaginal symptoms. Periods are regular.  MUSCULOSKELETAL: Chronic neck and back pain  NEURO: NEGATIVE for weakness, dizziness or paresthesias  NEURO: History of migraines  ENDOCRINE: NEGATIVE for temperature intolerance, skin/hair changes  HEME/ALLERGY/IMMUNE: NEGATIVE for bleeding problems  PSYCHIATRIC: NEGATIVE for changes in mood or affect  PSYCHIATRIC: History of depression and anxiety     OBJECTIVE:   /83 (BP Location: Right arm, Patient Position: Sitting, Cuff Size: Adult Large)   Pulse 78   Temp 98.6  F (37  C) (Oral)   Resp 18   Ht 1.734 m (5' 8.25\")   Wt 110.5 kg (243 lb 9.6 oz)   SpO2 99%   BMI 36.77 kg/m    Physical Exam  GENERAL: healthy, alert and no distress  EYES: Eyes grossly normal to inspection, PERRL and conjunctivae and sclerae normal  HENT: ear canals and TM's normal, nose and mouth without ulcers or lesions  NECK: no adenopathy, no asymmetry, masses, or scars and thyroid normal to palpation  RESP: lungs clear to auscultation - no rales, rhonchi or wheezes  BREAST: normal without masses, tenderness or nipple discharge and no palpable axillary masses or adenopathy  CV: regular rate and rhythm, normal S1 S2, no S3 or S4, no murmur, click or rub, no peripheral edema and peripheral pulses strong  ABDOMEN: soft, nontender, no hepatosplenomegaly, no masses and bowel sounds normal  MS: Tenderness in the lower back region  SKIN: no suspicious lesions or rashes  NEURO: Normal strength and tone, mentation intact and speech normal  PSYCH: mentation appears normal, affect normal/bright    Diagnostic Test Results:  Labs reviewed in Epic    ASSESSMENT/PLAN: "   (Z00.00) Routine general medical examination at a health care facility  (primary encounter diagnosis)  Comment:   Plan: Discussed on regular exercises, daily calcium intake, healthy eating, self breast exams monthly and routine dental checks    (F33.1) Moderate recurrent major depression (H)  Comment:   Plan: buPROPion (WELLBUTRIN XL) 150 MG 24 hr tablet,         citalopram (CELEXA) 20 MG tablet        Stable, continue current medications, recheck virtually in 6 months or sooner if needed    (F41.9) Anxiety  Comment:   Plan: buPROPion (WELLBUTRIN XL) 150 MG 24 hr tablet,         citalopram (CELEXA) 20 MG tablet        as above      (I10) Benign essential hypertension  Comment:   Plan: lisinopril (ZESTRIL) 10 MG tablet          BP Readings from Last 6 Encounters:   09/14/22 122/83   05/19/22 (P) 116/75   02/24/22 122/80   10/21/21 126/80   09/29/21 117/78   09/13/21 128/87     Last Comprehensive Metabolic Panel:  Sodium   Date Value Ref Range Status   08/24/2022 141 133 - 144 mmol/L Final   03/22/2021 140 133 - 144 mmol/L Final     Potassium   Date Value Ref Range Status   08/24/2022 4.6 3.4 - 5.3 mmol/L Final   03/22/2021 4.0 3.4 - 5.3 mmol/L Final     Chloride   Date Value Ref Range Status   08/24/2022 112 (H) 94 - 109 mmol/L Final   03/22/2021 111 (H) 94 - 109 mmol/L Final     Carbon Dioxide   Date Value Ref Range Status   03/22/2021 25 20 - 32 mmol/L Final     Carbon Dioxide (CO2)   Date Value Ref Range Status   08/24/2022 26 20 - 32 mmol/L Final     Anion Gap   Date Value Ref Range Status   08/24/2022 3 3 - 14 mmol/L Final   03/22/2021 4 3 - 14 mmol/L Final     Glucose   Date Value Ref Range Status   08/24/2022 95 70 - 99 mg/dL Final   03/22/2021 84 70 - 99 mg/dL Final     Urea Nitrogen   Date Value Ref Range Status   08/24/2022 11 7 - 30 mg/dL Final   03/22/2021 17 7 - 30 mg/dL Final     Creatinine   Date Value Ref Range Status   08/24/2022 0.91 0.52 - 1.04 mg/dL Final   03/22/2021 0.88 0.52 - 1.04 mg/dL  Final     GFR Estimate   Date Value Ref Range Status   08/24/2022 79 >60 mL/min/1.73m2 Final     Comment:     Effective December 21, 2021 eGFRcr in adults is calculated using the 2021 CKD-EPI creatinine equation which includes age and gender (Richmond et al., NEJ, DOI: 10.1056/RAKRwi0642647)   03/22/2021 80 >60 mL/min/[1.73_m2] Final     Comment:     Non  GFR Calc  Starting 12/18/2018, serum creatinine based estimated GFR (eGFR) will be   calculated using the Chronic Kidney Disease Epidemiology Collaboration   (CKD-EPI) equation.       Calcium   Date Value Ref Range Status   08/24/2022 9.3 8.5 - 10.1 mg/dL Final   03/22/2021 8.6 8.5 - 10.1 mg/dL Final     Lab Results   Component Value Date    MICROL <5 08/24/2022    MICROL <5 03/22/2021     No results found for: MICROALBUMIN  Reviewed normal BMP and microalbumin results.  Blood pressure is at goal, continue with current dose of lisinopril 10 mg daily, low-salt, regular exercises, recheck in 1 year or sooner if needed    (Z12.31) Encounter for screening mammogram for malignant neoplasm of breast  Comment:   Plan: MA Screening Digital Bilateral            (E66.01) Morbid obesity (H)  Comment:   Plan:   Wt Readings from Last 5 Encounters:   09/14/22 110.5 kg (243 lb 9.6 oz)   05/16/22 107.5 kg (237 lb)   02/24/22 107 kg (236 lb)   09/29/21 107.8 kg (237 lb 9 oz)   08/25/21 111.1 kg (245 lb)     Discussed in detail on intermittent fasting, low carbohydrate diet  Emphasized on weight loss, portion control, low calorie and low fat diet, healthy eating, regular exercises.      (M54.2,  M54.9,  G89.29) Chronic neck and back pain  Comment:   Plan: Education handouts given for home exercises for both lumbar back and isometric neck exercises to do at home, continue with topical lidocaine patches/diclofenac gel daily as needed for pain    (Z12.4) Cervical cancer screening  Comment:   Plan: Patient is not due for the Pap until 2026    (Z30.49) Encounter for  "surveillance of other contraceptive  Comment:   Plan: etonogestrel-ethinyl estradiol (NUVARING)         0.12-0.015 MG/24HR vaginal ring            (J30.9) Chronic allergic rhinitis  Comment:   Plan: mometasone (NASONEX) 50 MCG/ACT nasal spray            (G43.709) Chronic migraine without aura without status migrainosus, not intractable  Comment:   Plan: rizatriptan (MAXALT-MLT) 10 MG ODT        Stable, continue with avoiding potential triggers for migraine, Maxalt as needed    (B00.1) Recurrent cold sores  Comment:   Plan: valACYclovir (VALTREX) 500 MG tablet        Prescription given for episodic use    (J45.20) Mild intermittent asthma without complication  Comment:   Plan: albuterol (PROAIR HFA) 108 (90 Base) MCG/ACT         inhaler        Stable, provide prescription sent for inhaler to use as needed          COUNSELING:  Reviewed preventive health counseling, as reflected in patient instructions  Special attention given to:        Regular exercise       Healthy diet/nutrition       Vision screening       Immunizations    Vaccinated for: Influenza             Contraception       Osteoporosis prevention/bone health       The 10-year ASCVD risk score ([place] OLIVERA Jr., et al., 2013) is: 1.1%    Values used to calculate the score:      Age: 46 years      Sex: Female      Is Non- : No      Diabetic: No      Tobacco smoker: No      Systolic Blood Pressure: 122 mmHg      Is BP treated: Yes      HDL Cholesterol: 49 mg/dL      Total Cholesterol: 193 mg/dL    Estimated body mass index is 36.77 kg/m  as calculated from the following:    Height as of this encounter: 1.734 m (5' 8.25\").    Weight as of this encounter: 110.5 kg (243 lb 9.6 oz).    Weight management plan: Discussed healthy diet and exercise guidelines    She reports that she has never smoked. She has never used smokeless tobacco.      Counseling Resources:  ATP IV Guidelines  Pooled Cohorts Equation Calculator  Breast Cancer Risk " Calculator  BRCA-Related Cancer Risk Assessment: FHS-7 Tool  FRAX Risk Assessment  ICSI Preventive Guidelines  Dietary Guidelines for Americans, 2010  USDA's MyPlate  ASA Prophylaxis  Lung CA Screening    Nelida Celestin MD  [facility]  Answers for HPI/ROS submitted by the patient on 9/14/2022  If you checked off any problems, how difficult have these problems made it for you to do your work, take care of things at home, or get along with other people?: Somewhat difficult  PHQ9 TOTAL SCORE: 9    Chart documentation done in part with Dragon Voice recognition Software. Although reviewed after completion, some word and grammatical error may remain.

## 2023-03-26 ASSESSMENT — ASTHMA QUESTIONNAIRES
ACT_TOTALSCORE: 23
QUESTION_1 LAST FOUR WEEKS HOW MUCH OF THE TIME DID YOUR ASTHMA KEEP YOU FROM GETTING AS MUCH DONE AT WORK, SCHOOL OR AT HOME: NONE OF THE TIME
QUESTION_2 LAST FOUR WEEKS HOW OFTEN HAVE YOU HAD SHORTNESS OF BREATH: ONCE OR TWICE A WEEK
QUESTION_5 LAST FOUR WEEKS HOW WOULD YOU RATE YOUR ASTHMA CONTROL: COMPLETELY CONTROLLED
QUESTION_3 LAST FOUR WEEKS HOW OFTEN DID YOUR ASTHMA SYMPTOMS (WHEEZING, COUGHING, SHORTNESS OF BREATH, CHEST TIGHTNESS OR PAIN) WAKE YOU UP AT NIGHT OR EARLIER THAN USUAL IN THE MORNING: NOT AT ALL
QUESTION_4 LAST FOUR WEEKS HOW OFTEN HAVE YOU USED YOUR RESCUE INHALER OR NEBULIZER MEDICATION (SUCH AS ALBUTEROL): ONCE A WEEK OR LESS
ACT_TOTALSCORE: 23

## 2023-03-26 ASSESSMENT — ANXIETY QUESTIONNAIRES
2. NOT BEING ABLE TO STOP OR CONTROL WORRYING: SEVERAL DAYS
GAD7 TOTAL SCORE: 8
IF YOU CHECKED OFF ANY PROBLEMS ON THIS QUESTIONNAIRE, HOW DIFFICULT HAVE THESE PROBLEMS MADE IT FOR YOU TO DO YOUR WORK, TAKE CARE OF THINGS AT HOME, OR GET ALONG WITH OTHER PEOPLE: SOMEWHAT DIFFICULT
5. BEING SO RESTLESS THAT IT IS HARD TO SIT STILL: SEVERAL DAYS
3. WORRYING TOO MUCH ABOUT DIFFERENT THINGS: SEVERAL DAYS
4. TROUBLE RELAXING: SEVERAL DAYS
1. FEELING NERVOUS, ANXIOUS, OR ON EDGE: SEVERAL DAYS
6. BECOMING EASILY ANNOYED OR IRRITABLE: MORE THAN HALF THE DAYS
7. FEELING AFRAID AS IF SOMETHING AWFUL MIGHT HAPPEN: SEVERAL DAYS
GAD7 TOTAL SCORE: 8
8. IF YOU CHECKED OFF ANY PROBLEMS, HOW DIFFICULT HAVE THESE MADE IT FOR YOU TO DO YOUR WORK, TAKE CARE OF THINGS AT HOME, OR GET ALONG WITH OTHER PEOPLE?: SOMEWHAT DIFFICULT
7. FEELING AFRAID AS IF SOMETHING AWFUL MIGHT HAPPEN: SEVERAL DAYS
GAD7 TOTAL SCORE: 8

## 2023-03-26 ASSESSMENT — PATIENT HEALTH QUESTIONNAIRE - PHQ9
SUM OF ALL RESPONSES TO PHQ QUESTIONS 1-9: 13
SUM OF ALL RESPONSES TO PHQ QUESTIONS 1-9: 13
10. IF YOU CHECKED OFF ANY PROBLEMS, HOW DIFFICULT HAVE THESE PROBLEMS MADE IT FOR YOU TO DO YOUR WORK, TAKE CARE OF THINGS AT HOME, OR GET ALONG WITH OTHER PEOPLE: SOMEWHAT DIFFICULT

## 2023-03-27 ENCOUNTER — ANCILLARY PROCEDURE (OUTPATIENT)
Dept: MAMMOGRAPHY | Facility: CLINIC | Age: 48
End: 2023-03-27
Attending: FAMILY MEDICINE
Payer: COMMERCIAL

## 2023-03-27 DIAGNOSIS — Z12.31 ENCOUNTER FOR SCREENING MAMMOGRAM FOR MALIGNANT NEOPLASM OF BREAST: ICD-10-CM

## 2023-03-27 PROCEDURE — 77067 SCR MAMMO BI INCL CAD: CPT

## 2023-03-29 ENCOUNTER — VIRTUAL VISIT (OUTPATIENT)
Dept: FAMILY MEDICINE | Facility: CLINIC | Age: 48
End: 2023-03-29
Payer: COMMERCIAL

## 2023-03-29 DIAGNOSIS — G43.709 CHRONIC MIGRAINE WITHOUT AURA WITHOUT STATUS MIGRAINOSUS, NOT INTRACTABLE: ICD-10-CM

## 2023-03-29 DIAGNOSIS — F41.9 ANXIETY: ICD-10-CM

## 2023-03-29 DIAGNOSIS — B00.1 RECURRENT COLD SORES: ICD-10-CM

## 2023-03-29 DIAGNOSIS — J30.9 CHRONIC ALLERGIC RHINITIS: ICD-10-CM

## 2023-03-29 DIAGNOSIS — J45.20 MILD INTERMITTENT ASTHMA WITHOUT COMPLICATION: ICD-10-CM

## 2023-03-29 DIAGNOSIS — F33.1 MODERATE RECURRENT MAJOR DEPRESSION (H): ICD-10-CM

## 2023-03-29 DIAGNOSIS — I10 BENIGN ESSENTIAL HYPERTENSION: ICD-10-CM

## 2023-03-29 PROCEDURE — 99214 OFFICE O/P EST MOD 30 MIN: CPT | Mod: VID | Performed by: FAMILY MEDICINE

## 2023-03-29 PROCEDURE — 96127 BRIEF EMOTIONAL/BEHAV ASSMT: CPT | Mod: VID | Performed by: FAMILY MEDICINE

## 2023-03-29 RX ORDER — VALACYCLOVIR HYDROCHLORIDE 500 MG/1
500 TABLET, FILM COATED ORAL 2 TIMES DAILY
Qty: 6 TABLET | Refills: 4 | Status: SHIPPED | OUTPATIENT
Start: 2023-03-29 | End: 2023-12-13

## 2023-03-29 RX ORDER — BUPROPION HYDROCHLORIDE 150 MG/1
150 TABLET ORAL EVERY MORNING
Qty: 90 TABLET | Refills: 1 | Status: SHIPPED | OUTPATIENT
Start: 2023-03-29 | End: 2023-12-13

## 2023-03-29 RX ORDER — CITALOPRAM HYDROBROMIDE 20 MG/1
30 TABLET ORAL EVERY EVENING
Qty: 135 TABLET | Refills: 1 | Status: SHIPPED | OUTPATIENT
Start: 2023-03-29 | End: 2023-09-25

## 2023-03-29 RX ORDER — RIZATRIPTAN BENZOATE 10 MG/1
10 TABLET, ORALLY DISINTEGRATING ORAL SEE ADMIN INSTRUCTIONS
Qty: 6 TABLET | Refills: 6 | Status: SHIPPED | OUTPATIENT
Start: 2023-03-29 | End: 2023-12-13

## 2023-03-29 RX ORDER — MOMETASONE FUROATE MONOHYDRATE 50 UG/1
2 SPRAY, METERED NASAL DAILY PRN
Qty: 17 G | Refills: 6 | Status: SHIPPED | OUTPATIENT
Start: 2023-03-29 | End: 2024-05-31

## 2023-03-29 RX ORDER — ALBUTEROL SULFATE 90 UG/1
2 AEROSOL, METERED RESPIRATORY (INHALATION) EVERY 6 HOURS PRN
Qty: 18 G | Refills: 1 | Status: SHIPPED | OUTPATIENT
Start: 2023-03-29 | End: 2023-12-13

## 2023-03-29 RX ORDER — LISINOPRIL 10 MG/1
10 TABLET ORAL DAILY
Qty: 90 TABLET | Refills: 3 | Status: SHIPPED | OUTPATIENT
Start: 2023-03-29 | End: 2023-12-13 | Stop reason: SINTOL

## 2023-03-29 ASSESSMENT — PATIENT HEALTH QUESTIONNAIRE - PHQ9
10. IF YOU CHECKED OFF ANY PROBLEMS, HOW DIFFICULT HAVE THESE PROBLEMS MADE IT FOR YOU TO DO YOUR WORK, TAKE CARE OF THINGS AT HOME, OR GET ALONG WITH OTHER PEOPLE: SOMEWHAT DIFFICULT
SUM OF ALL RESPONSES TO PHQ QUESTIONS 1-9: 13

## 2023-03-29 ASSESSMENT — ANXIETY QUESTIONNAIRES: GAD7 TOTAL SCORE: 8

## 2023-03-29 NOTE — PROGRESS NOTES
Treasure is a 47 year old who is being evaluated via a billable video visit.      How would you like to obtain your AVS? [place]  If the video visit is dropped, the invitation should be resent by: Text to cell phone: [phone]  Will anyone else be joining your video visit? No        Assessment & Plan     Mild intermittent asthma without complication  Stable, continue with current inhaler, follow for recheck in 6 to 3 months or sooner if needed  - albuterol (PROAIR HFA) 108 (90 Base) MCG/ACT inhaler; Inhale 2 puffs into the lungs every 6 hours as needed for shortness of breath or wheezing Profile Rx  - Asthma Action Plan (AAP)    Moderate recurrent major depression (H)  Improved per patient  Continue with current dose of Wellbutrin and Celexa, will start on counseling, recheck 6 months or sooner if needed  - buPROPion (WELLBUTRIN XL) 150 MG 24 hr tablet; Take 1 tablet (150 mg) by mouth every morning Change in dose  - citalopram (CELEXA) 20 MG tablet; Take 1.5 tablets (30 mg) by mouth every evening Dose change  - Adult Mental Health  Referral; Future    Anxiety  as above  NATANAEL-7 SCORE 9/29/2021 4/5/2022 3/26/2023   Total Score - - -   Total Score 8 (mild anxiety) 7 (mild anxiety) 8 (mild anxiety)   Total Score 8 7 8         - buPROPion (WELLBUTRIN XL) 150 MG 24 hr tablet; Take 1 tablet (150 mg) by mouth every morning Change in dose  - citalopram (CELEXA) 20 MG tablet; Take 1.5 tablets (30 mg) by mouth every evening Dose change  - Adult Mental Health  Referral; Future    Chronic allergic rhinitis  Refills given per patient's request  - mometasone (NASONEX) 50 MCG/ACT nasal spray; Spray 2 sprays into both nostrils daily as needed (Nasal allergies) Profile prescription    Benign essential hypertension  Reviewed home blood pressure numbers-in good range  Continue current dose of lisinopril, low-salt diet, regular exercises, recheck in 6 months at the time of physical along with fasting labs  - lisinopril  "(ZESTRIL) 10 MG tablet; Take 1 tablet (10 mg) by mouth daily    Chronic migraine without aura without status migrainosus, not intractable  Stable, continue with Maxalt as needed for episodic migraine headaches, continue with avoiding potential triggers for migraine, recheck in 6 months or sooner if needed  - rizatriptan (MAXALT-MLT) 10 MG ODT; Take 1 tablet (10 mg) by mouth See Admin Instructions WITH ONSET OF MIGRAINE, MAY REPEAT ONCE AFTER 2 HOURS. DO NOT EXCEED 3 TABLETS IN 24 HOURS.    Recurrent cold sores  Stable, refills given for as needed use  - valACYclovir (VALTREX) 500 MG tablet; Take 1 tablet (500 mg) by mouth 2 times daily    Review of the result(s) of each unique test - Mammogram from yesterday  [id number]}     BMI:   Estimated body mass index is 36.77 kg/m  as calculated from the following:    Height as of 9/14/22: 1.734 m (5' 8.25\").    Weight as of 9/14/22: 110.5 kg (243 lb 9.6 oz).   Weight management plan: Continue with healthy eating, regular exercises    Work on weight loss  Regular exercise  Chart documentation done in part with Dragon Voice recognition Software. Although reviewed after completion, some word and grammatical error may remain.    See Patient Instructions    Nelida Celestin MD  [facility]    Cathy Amanda is a 47 year old, presenting for the following health issues:  Patient is here for a video visit instead of in person visit due to the current COVID-19 pandemic.    Recheck Medication    Additional Questions 3/29/2023   Roomed by Silvia     History of Present Illness       Mental Health Follow-up:  Patient presents to follow-up on Depression & Anxiety.Patient's depression since last visit has been:  Good  The patient is having other symptoms associated with depression.  Patient's anxiety since last visit has been:  Better  The patient is having other symptoms associated with anxiety.  Any significant life events: No  Patient is feeling anxious or " having panic attacks.  Patient has no concerns about alcohol or drug use.    Hypertension: She presents for follow up of hypertension.  She does check blood pressure  regularly outside of the clinic. Outside blood pressures have been over 140/90. She does not follow a low salt diet.     Reason for visit:  6 month medication check    She eats 4 or more servings of fruits and vegetables daily.She consumes 1 sweetened beverage(s) daily.She exercises with enough effort to increase her heart rate 9 or less minutes per day.  She exercises with enough effort to increase her heart rate 3 or less days per week.   She is taking medications regularly.    Today's PHQ-9         PHQ-9 Total Score: 13    PHQ-9 Q9 Thoughts of better off dead/self-harm past 2 weeks :   Not at all    How difficult have these problems made it for you to do your work, take care of things at home, or get along with other people: Somewhat difficult  Today's NATANAEL-7 Score: 8       Hypertension Follow-up      Do you check your blood pressure regularly outside of the clinic? Yes     Are you following a low salt diet? Yes    Are your blood pressures ever more than 140 on the top number (systolic) OR more   than 90 on the bottom number (diastolic), for example 140/90? No    Depression and Anxiety Follow-Up    How are you doing with your depression since your last visit? Improved     How are you doing with your anxiety since your last visit?  Improved     Are you having other symptoms that might be associated with depression or anxiety? No    Have you had a significant life event? No     Do you have any concerns with your use of alcohol or other drugs? No    Social History     Tobacco Use     Smoking status: Never     Smokeless tobacco: Never   Vaping Use     Vaping Use: Never used   Substance Use Topics     Alcohol use: Yes     Comment: 3-4 drinks per month     Drug use: Never     PHQ 4/5/2022 9/14/2022 3/26/2023   PHQ-9 Total Score 11 9 13   Q9: Thoughts of  better off dead/self-harm past 2 weeks Not at all Not at all Not at all     NATANAEL-7 SCORE 9/29/2021 4/5/2022 3/26/2023   Total Score - - -   Total Score 8 (mild anxiety) 7 (mild anxiety) 8 (mild anxiety)   Total Score 8 7 8     Last PHQ-9 3/26/2023   1.  Little interest or pleasure in doing things 1   2.  Feeling down, depressed, or hopeless 1   3.  Trouble falling or staying asleep, or sleeping too much 2   4.  Feeling tired or having little energy 3   5.  Poor appetite or overeating 3   6.  Feeling bad about yourself 1   7.  Trouble concentrating 1   8.  Moving slowly or restless 1   Q9: Thoughts of better off dead/self-harm past 2 weeks 0   PHQ-9 Total Score 13   Difficulty at work, home, or with people -     NATANAEL-7  3/26/2023   1. Feeling nervous, anxious, or on edge 1   2. Not being able to stop or control worrying 1   3. Worrying too much about different things 1   4. Trouble relaxing 1   5. Being so restless that it is hard to sit still 1   6. Becoming easily annoyed or irritable 2   7. Feeling afraid, as if something awful might happen 1   NATANAEL-7 Total Score 8   If you checked any problems, how difficult have they made it for you to do your work, take care of things at home, or get along with other people? Somewhat difficult       Suicide Assessment Five-step Evaluation and Treatment (SAFE-T)    Asthma Follow-Up    Was ACT completed today?    Yes    ACT Total Scores 3/26/2023   ACT TOTAL SCORE -   ASTHMA ER VISITS -   ASTHMA HOSPITALIZATIONS -   ACT TOTAL SCORE (Goal Greater than or Equal to 20) 23   In the past 12 months, how many times did you visit the emergency room for your asthma without being admitted to the hospital? 0   In the past 12 months, how many times were you hospitalized overnight because of your asthma? 0          How many days per week do you miss taking your asthma controller medication?  I do not have an asthma controller medication    Please describe any recent triggers for your asthma:  upper respiratory infections and pollens    Have you had any Emergency Room Visits, Urgent Care Visits, or Hospital Admissions since your last office visit?  No    Migraine     Since your last clinic visit, how have your headaches changed?  No change    How often are you getting headaches or migraines?  Once in 2 months    Are you able to do normal daily activities when you have a migraine? Yes    Are you taking rescue/relief medications? (Select all that apply) Maxalt    How helpful is your rescue/relief medication?  I get total relief    Are you taking any medications to prevent migraines? (Select all that apply)  No    In the past 4 weeks, how often have you gone to urgent care or the emergency room because of your headaches?  0      Medication Followup of Valtrex    Taking Medication as prescribed: yes    Side Effects:  None    Medication Helping Symptoms:  yes        Review of Systems   CONSTITUTIONAL: NEGATIVE for fever, chills, change in weight  RESP: NEGATIVE for significant cough or SOB  RESP:Hx asthma  CV: NEGATIVE for chest pain, palpitations or peripheral edema  CV: Hx HTN  GI: NEGATIVE for nausea, abdominal pain, heartburn, or change in bowel habits  MUSCULOSKELETAL: NEGATIVE for significant arthralgias or myalgia  NEURO: NEGATIVE for weakness, dizziness or paresthesias and Hx headaches-migraine  ENDOCRINE: NEGATIVE for temperature intolerance, skin/hair changes  HEME/ALLERGY/IMMUNE: NEGATIVE for bleeding problems  PSYCHIATRIC: NEGATIVE for changes in mood or affect, HX anxiety and HX depression      Objective           Vitals:  No vitals were obtained today due to virtual visit.    Physical Exam   GENERAL: Healthy, alert and no distress  EYES: Eyes grossly normal to inspection  RESP: No audible wheeze, cough, or visible cyanosis.  No visible retractions or increased work of breathing.    SKIN: Visible skin clear. No significant rash, abnormal pigmentation or lesions.  NEURO: Cranial nerves grossly  intact.  Mentation and speech appropriate for age.  PSYCH: Mentation appears normal, affect normal/bright, judgement and insight intact, normal speech and appearance well-groomed.                Video-Visit Details    Type of service:  Video Visit     Originating Location (pt. Location): Home  Distant Location (provider location):  Off-site  Platform used for Video Visit: [employer]

## 2023-03-29 NOTE — LETTER
My Asthma Action Plan  Name: Treasure Pradhan   YOB: 1975  Date: 3/29/2023   My doctor: Nelida Celestin   My clinic: [facility]      My Control Medicine: None        Dose:   My Rescue Medicine: Albuterol        Dose: 2 puffs every 4-6 hours as needed   My Asthma Severity: Intermittent / Exercise Induced  Avoid your asthma triggers: upper respiratory infections and pollens        GREEN ZONE   Good Control    I feel good    No cough or wheeze    Can work, sleep and play without asthma symptoms       Take your asthma control medicine every day.     1. If exercise triggers your asthma, take your rescue medication    15 minutes before exercise or sports, and    During exercise if you have asthma symptoms  2. Spacer to use with inhaler: If you have a spacer, make sure to use it with your inhaler             YELLOW ZONE Getting Worse  I have ANY of these:    I do not feel good    Cough or wheeze    Chest feels tight    Wake up at night   1. Keep taking your Green Zone medications  2. Start taking your rescue medicine:    every 20 minutes for up to 1 hour. Then every 4 hours for 24-48 hours.  3. If you stay in the Yellow Zone for more than 12-24 hours, contact your doctor.  4. If you do not return to the Green Zone in 12-24 hours or you get worse, start taking your oral steroid medicine if prescribed by your provider.           RED ZONE Medical Alert - Get Help  I have ANY of these:    I feel awful    Medicine is not helping    Breathing getting harder    Trouble walking or talking    Nose opens wide to breathe       1. Take your rescue medicine NOW  2. If your provider has prescribed an oral steroid medicine, start taking it NOW  3. Call your doctor NOW  4. If you are still in the Red Zone after 20 minutes and you have not reached your doctor:    Take your rescue medicine again and    Call 911 or go to the emergency room right away    See your regular doctor within 2 weeks of an  Emergency Room or Urgent Care visit for follow-up treatment.        The above medication may be given at school or day care?: N/A (Adult Patient)  Child can carry and use inhaler(s) at school with approval of school nurse?: N/A (Adult Patient)    Electronically signed by: Nelida Celestin MD, March 29, 2023    Annual Reminders:  Meet with Asthma Educator,  Flu Shot in the Fall, consider Pneumonia Vaccination for patients with asthma (aged 19 and older).    Pharmacy:    [facility] [address] [facility], [place] - [address] [facility] [place], [place] - [address] [facility]                    Asthma Triggers  How To Control Things That Make Your Asthma Worse    Triggers are things that make your asthma worse.  Look at the list below to help you find your triggers and what you can do about them.  You can help prevent asthma flare-ups by staying away from your triggers.      Trigger                                                          What you can do   Cigarette Smoke  Tobacco smoke can make asthma worse. Do not allow smoking in your home, car or around you.  Be sure no one smokes at a child s day care or school.  If you smoke, ask your health care provider for ways to help you quit.  Ask family members to quit too.  Ask your health care provider for a referral to Quit Plan to help you quit smoking, or call 2-453-800-PLAN.     Colds, Flu, Bronchitis  These are common triggers of asthma. Wash your hands often.  Don t touch your eyes, nose or mouth.  Get a flu shot every year.     Dust Mites  These are tiny bugs that live in cloth or carpet. They are too small to see. Wash sheets and blankets in hot water every week.   Encase pillows and mattress in dust mite proof covers.  Avoid having carpet if you can. If you have carpet, vacuum weekly.   Use a dust mask and HEPA vacuum.   Pollen and Outdoor Mold  Some people are allergic to trees, grass, or weed pollen, or molds. Try to keep your  windows closed.  Limit time out doors when pollen count is high.   Ask you health care provider about taking medicine during allergy season.     Animal Dander  Some people are allergic to skin flakes, urine or saliva from pets with fur or feathers. Keep pets with fur or feathers out of your home.    If you can t keep the pet outdoors, then keep the pet out of your bedroom.  Keep the bedroom door closed.  Keep pets off cloth furniture and away from stuffed toys.     Mice, Rats, and Cockroaches  Some people are allergic to the waste from these pests.   Cover food and garbage.  Clean up spills and food crumbs.  Store grease in the refrigerator.   Keep food out of the bedroom.   Indoor Mold  This can be a trigger if your home has high moisture. Fix leaking faucets, pipes, or other sources of water.   Clean moldy surfaces.  Dehumidify basement if it is damp and smelly.   Smoke, Strong Odors, and Sprays  These can reduce air quality. Stay away from strong odors and sprays, such as perfume, powder, hair spray, paints, smoke incense, paint, cleaning products, candles and new carpet.   Exercise or Sports  Some people with asthma have this trigger. Be active!  Ask your doctor about taking medicine before sports or exercise to prevent symptoms.    Warm up for 5-10 minutes before and after sports or exercise.     Other Triggers of Asthma  Cold air:  Cover your nose and mouth with a scarf.  Sometimes laughing or crying can be a trigger.  Some medicines and food can trigger asthma.

## 2023-09-25 DIAGNOSIS — Z30.49 ENCOUNTER FOR SURVEILLANCE OF OTHER CONTRACEPTIVE: ICD-10-CM

## 2023-09-25 RX ORDER — ETONOGESTREL AND ETHINYL ESTRADIOL VAGINAL RING .015; .12 MG/D; MG/D
RING VAGINAL
Qty: 3 EACH | Refills: 0 | Status: SHIPPED | OUTPATIENT
Start: 2023-09-25 | End: 2023-12-13

## 2023-11-26 ENCOUNTER — HEALTH MAINTENANCE LETTER (OUTPATIENT)
Age: 48
End: 2023-11-26

## 2023-12-11 ASSESSMENT — ASTHMA QUESTIONNAIRES: ACT_TOTALSCORE: 25

## 2023-12-11 ASSESSMENT — ENCOUNTER SYMPTOMS
FREQUENCY: 0
EYE PAIN: 0
NAUSEA: 0
SORE THROAT: 0
HEMATOCHEZIA: 0
NERVOUS/ANXIOUS: 1
ABDOMINAL PAIN: 0
BREAST MASS: 0
JOINT SWELLING: 0
PALPITATIONS: 0
MYALGIAS: 1
HEARTBURN: 0
DIARRHEA: 0
HEMATURIA: 0
DIZZINESS: 0
WEAKNESS: 0
CONSTIPATION: 0
FEVER: 0
CHILLS: 0
DYSURIA: 0
ARTHRALGIAS: 1
PARESTHESIAS: 0
HEADACHES: 1
COUGH: 0
SHORTNESS OF BREATH: 0

## 2023-12-12 ASSESSMENT — PATIENT HEALTH QUESTIONNAIRE - PHQ9
10. IF YOU CHECKED OFF ANY PROBLEMS, HOW DIFFICULT HAVE THESE PROBLEMS MADE IT FOR YOU TO DO YOUR WORK, TAKE CARE OF THINGS AT HOME, OR GET ALONG WITH OTHER PEOPLE: VERY DIFFICULT
SUM OF ALL RESPONSES TO PHQ QUESTIONS 1-9: 10
SUM OF ALL RESPONSES TO PHQ QUESTIONS 1-9: 10

## 2023-12-13 ENCOUNTER — OFFICE VISIT (OUTPATIENT)
Dept: FAMILY MEDICINE | Facility: CLINIC | Age: 48
End: 2023-12-13
Payer: COMMERCIAL

## 2023-12-13 VITALS
BODY MASS INDEX: 37.87 KG/M2 | WEIGHT: 249.9 LBS | HEIGHT: 68 IN | RESPIRATION RATE: 16 BRPM | DIASTOLIC BLOOD PRESSURE: 80 MMHG | TEMPERATURE: 98.6 F | OXYGEN SATURATION: 96 % | HEART RATE: 67 BPM | SYSTOLIC BLOOD PRESSURE: 129 MMHG

## 2023-12-13 DIAGNOSIS — F41.9 ANXIETY: ICD-10-CM

## 2023-12-13 DIAGNOSIS — Z30.49 ENCOUNTER FOR SURVEILLANCE OF OTHER CONTRACEPTIVE: ICD-10-CM

## 2023-12-13 DIAGNOSIS — I10 BENIGN ESSENTIAL HYPERTENSION: ICD-10-CM

## 2023-12-13 DIAGNOSIS — B00.1 RECURRENT COLD SORES: ICD-10-CM

## 2023-12-13 DIAGNOSIS — Z00.00 ROUTINE GENERAL MEDICAL EXAMINATION AT A HEALTH CARE FACILITY: Primary | ICD-10-CM

## 2023-12-13 DIAGNOSIS — J45.20 MILD INTERMITTENT ASTHMA WITHOUT COMPLICATION: ICD-10-CM

## 2023-12-13 DIAGNOSIS — Z12.4 CERVICAL CANCER SCREENING: ICD-10-CM

## 2023-12-13 DIAGNOSIS — E66.812 CLASS 2 SEVERE OBESITY DUE TO EXCESS CALORIES WITH SERIOUS COMORBIDITY AND BODY MASS INDEX (BMI) OF 38.0 TO 38.9 IN ADULT (H): ICD-10-CM

## 2023-12-13 DIAGNOSIS — F33.1 MODERATE RECURRENT MAJOR DEPRESSION (H): ICD-10-CM

## 2023-12-13 DIAGNOSIS — Z12.31 ENCOUNTER FOR SCREENING MAMMOGRAM FOR MALIGNANT NEOPLASM OF BREAST: ICD-10-CM

## 2023-12-13 DIAGNOSIS — G43.709 CHRONIC MIGRAINE WITHOUT AURA WITHOUT STATUS MIGRAINOSUS, NOT INTRACTABLE: ICD-10-CM

## 2023-12-13 DIAGNOSIS — Z13.0 SCREENING FOR DEFICIENCY ANEMIA: ICD-10-CM

## 2023-12-13 DIAGNOSIS — J30.9 CHRONIC ALLERGIC RHINITIS: ICD-10-CM

## 2023-12-13 DIAGNOSIS — E66.01 CLASS 2 SEVERE OBESITY DUE TO EXCESS CALORIES WITH SERIOUS COMORBIDITY AND BODY MASS INDEX (BMI) OF 38.0 TO 38.9 IN ADULT (H): ICD-10-CM

## 2023-12-13 DIAGNOSIS — Z13.220 SCREENING FOR HYPERLIPIDEMIA: ICD-10-CM

## 2023-12-13 DIAGNOSIS — Z12.11 COLON CANCER SCREENING: ICD-10-CM

## 2023-12-13 DIAGNOSIS — Z13.1 SCREENING FOR DIABETES MELLITUS (DM): ICD-10-CM

## 2023-12-13 DIAGNOSIS — D12.6 TUBULAR ADENOMA OF COLON: ICD-10-CM

## 2023-12-13 LAB
ALBUMIN SERPL BCG-MCNC: 3.8 G/DL (ref 3.5–5.2)
ALP SERPL-CCNC: 71 U/L (ref 40–150)
ALT SERPL W P-5'-P-CCNC: 16 U/L (ref 0–50)
ANION GAP SERPL CALCULATED.3IONS-SCNC: 9 MMOL/L (ref 7–15)
AST SERPL W P-5'-P-CCNC: 30 U/L (ref 0–45)
BILIRUB SERPL-MCNC: 0.2 MG/DL
BUN SERPL-MCNC: 20.8 MG/DL (ref 6–20)
CALCIUM SERPL-MCNC: 9.2 MG/DL (ref 8.6–10)
CHLORIDE SERPL-SCNC: 107 MMOL/L (ref 98–107)
CHOLEST SERPL-MCNC: 195 MG/DL
CREAT SERPL-MCNC: 0.79 MG/DL (ref 0.51–0.95)
CREAT UR-MCNC: 173 MG/DL
DEPRECATED HCO3 PLAS-SCNC: 23 MMOL/L (ref 22–29)
EGFRCR SERPLBLD CKD-EPI 2021: >90 ML/MIN/1.73M2
ERYTHROCYTE [DISTWIDTH] IN BLOOD BY AUTOMATED COUNT: 15.1 % (ref 10–15)
FASTING STATUS PATIENT QL REPORTED: YES
GLUCOSE SERPL-MCNC: 96 MG/DL (ref 70–99)
HCT VFR BLD AUTO: 39.1 % (ref 35–47)
HDLC SERPL-MCNC: 48 MG/DL
HGB BLD-MCNC: 12.2 G/DL (ref 11.7–15.7)
LDLC SERPL CALC-MCNC: 119 MG/DL
MCH RBC QN AUTO: 28.2 PG (ref 26.5–33)
MCHC RBC AUTO-ENTMCNC: 31.2 G/DL (ref 31.5–36.5)
MCV RBC AUTO: 91 FL (ref 78–100)
MICROALBUMIN UR-MCNC: <12 MG/L
MICROALBUMIN/CREAT UR: NORMAL MG/G{CREAT}
NONHDLC SERPL-MCNC: 147 MG/DL
PLATELET # BLD AUTO: 405 10E3/UL (ref 150–450)
POTASSIUM SERPL-SCNC: 4.8 MMOL/L (ref 3.4–5.3)
PROT SERPL-MCNC: 6.6 G/DL (ref 6.4–8.3)
RBC # BLD AUTO: 4.32 10E6/UL (ref 3.8–5.2)
SODIUM SERPL-SCNC: 139 MMOL/L (ref 135–145)
TRIGL SERPL-MCNC: 140 MG/DL
WBC # BLD AUTO: 6.5 10E3/UL (ref 4–11)

## 2023-12-13 PROCEDURE — 80053 COMPREHEN METABOLIC PANEL: CPT | Performed by: FAMILY MEDICINE

## 2023-12-13 PROCEDURE — 82570 ASSAY OF URINE CREATININE: CPT | Performed by: FAMILY MEDICINE

## 2023-12-13 PROCEDURE — 90715 TDAP VACCINE 7 YRS/> IM: CPT | Performed by: FAMILY MEDICINE

## 2023-12-13 PROCEDURE — 90471 IMMUNIZATION ADMIN: CPT | Performed by: FAMILY MEDICINE

## 2023-12-13 PROCEDURE — 99214 OFFICE O/P EST MOD 30 MIN: CPT | Mod: 25 | Performed by: FAMILY MEDICINE

## 2023-12-13 PROCEDURE — 36415 COLL VENOUS BLD VENIPUNCTURE: CPT | Performed by: FAMILY MEDICINE

## 2023-12-13 PROCEDURE — 96127 BRIEF EMOTIONAL/BEHAV ASSMT: CPT | Performed by: FAMILY MEDICINE

## 2023-12-13 PROCEDURE — 80061 LIPID PANEL: CPT | Performed by: FAMILY MEDICINE

## 2023-12-13 PROCEDURE — 99396 PREV VISIT EST AGE 40-64: CPT | Mod: 25 | Performed by: FAMILY MEDICINE

## 2023-12-13 PROCEDURE — 82043 UR ALBUMIN QUANTITATIVE: CPT | Performed by: FAMILY MEDICINE

## 2023-12-13 PROCEDURE — 85027 COMPLETE CBC AUTOMATED: CPT | Performed by: FAMILY MEDICINE

## 2023-12-13 RX ORDER — RIZATRIPTAN BENZOATE 10 MG/1
10 TABLET, ORALLY DISINTEGRATING ORAL SEE ADMIN INSTRUCTIONS
Qty: 6 TABLET | Refills: 6 | Status: SHIPPED | OUTPATIENT
Start: 2023-12-13 | End: 2024-06-26

## 2023-12-13 RX ORDER — ALBUTEROL SULFATE 90 UG/1
2 AEROSOL, METERED RESPIRATORY (INHALATION) EVERY 6 HOURS PRN
Qty: 18 G | Refills: 1 | Status: SHIPPED | OUTPATIENT
Start: 2023-12-13

## 2023-12-13 RX ORDER — BUPROPION HYDROCHLORIDE 150 MG/1
150 TABLET ORAL EVERY MORNING
Qty: 90 TABLET | Refills: 1 | Status: SHIPPED | OUTPATIENT
Start: 2023-12-13 | End: 2024-05-31

## 2023-12-13 RX ORDER — MOMETASONE FUROATE MONOHYDRATE 50 UG/1
2 SPRAY, METERED NASAL DAILY PRN
Qty: 17 G | Refills: 6 | Status: CANCELLED | OUTPATIENT
Start: 2023-12-13

## 2023-12-13 RX ORDER — ETONOGESTREL AND ETHINYL ESTRADIOL VAGINAL RING .015; .12 MG/D; MG/D
RING VAGINAL
Qty: 3 EACH | Refills: 3 | Status: SHIPPED | OUTPATIENT
Start: 2023-12-13

## 2023-12-13 RX ORDER — CITALOPRAM HYDROBROMIDE 20 MG/1
TABLET ORAL
Qty: 135 TABLET | Refills: 1 | Status: SHIPPED | OUTPATIENT
Start: 2023-12-13 | End: 2024-05-31

## 2023-12-13 RX ORDER — VALACYCLOVIR HYDROCHLORIDE 500 MG/1
500 TABLET, FILM COATED ORAL 2 TIMES DAILY
Qty: 6 TABLET | Refills: 4 | Status: SHIPPED | OUTPATIENT
Start: 2023-12-13

## 2023-12-13 ASSESSMENT — ENCOUNTER SYMPTOMS
PARESTHESIAS: 0
NERVOUS/ANXIOUS: 1
HEADACHES: 1
ABDOMINAL PAIN: 0
CONSTIPATION: 0
DYSURIA: 0
FREQUENCY: 0
SHORTNESS OF BREATH: 0
HEARTBURN: 0
HEMATURIA: 0
JOINT SWELLING: 0
PALPITATIONS: 0
COUGH: 0
EYE PAIN: 0
ARTHRALGIAS: 1
FEVER: 0
SORE THROAT: 0
MYALGIAS: 1
HEMATOCHEZIA: 0
DIZZINESS: 0
NAUSEA: 0
WEAKNESS: 0
BREAST MASS: 0
CHILLS: 0
DIARRHEA: 0

## 2023-12-13 NOTE — PROGRESS NOTES
SUBJECTIVE:   Treasure is a 47 year old, presenting for the followin/13/2023     7:42 AM   Additional Questions   Roomed by Radha SCHMIDT   Accompanied by self         2023     7:43 AM   Patient Reported Additional Medications   Patient reports taking the following new medications none       Healthy Habits:     Getting at least 3 servings of Calcium per day:  Yes    Bi-annual eye exam:  NO    Dental care twice a year:  Yes    Sleep apnea or symptoms of sleep apnea:  Daytime drowsiness    Diet:  Low salt and Gluten-free/reduced    Frequency of exercise:  2-3 days/week    Taking medications regularly:  Yes    Medication side effects:  Other    Additional concerns today:  Yes      Today's PHQ-9 Score:       2023     8:08 PM   PHQ-9 SCORE   PHQ-9 Total Score MyChart 10 (Moderate depression)   PHQ-9 Total Score 10                 Hypertension Follow-up  Patient had stopped taking lisinopril due to trickling sensation in the throat, has been checking blood pressures at home which are in the, normal range  Do you check your blood pressure regularly outside of the clinic? Yes   Are you following a low salt diet? Yes  Are your blood pressures ever more than 140 on the top number (systolic) OR more   than 90 on the bottom number (diastolic), for example 140/90? No    Depression and Anxiety Follow-Up  How are you doing with your depression since your last visit?  Slightly worsened in the past few weeks secondary to situational stress at home, but feeling better now, on counseling  How are you doing with your anxiety since your last visit?  As above  Are you having other symptoms that might be associated with depression or anxiety?  See PHQ-9 and NATANAEL-7  Have you had a significant life event? OTHER: [facility]    Do you have any concerns with your use of alcohol or other drugs? No    Social History     Tobacco Use    Smoking status: Never    Smokeless tobacco: Never   Vaping Use    Vaping Use: Never  used   Substance Use Topics    Alcohol use: Yes     Comment: 3-4 drinks per month    Drug use: Never         9/14/2022     7:26 AM 3/26/2023     6:49 PM 12/12/2023     8:08 PM   PHQ   PHQ-9 Total Score 9 13 10   Q9: Thoughts of better off dead/self-harm past 2 weeks Not at all Not at all Not at all         9/29/2021     7:43 AM 4/5/2022     8:00 PM 3/26/2023     6:49 PM   NATANAEL-7 SCORE   Total Score 8 (mild anxiety) 7 (mild anxiety) 8 (mild anxiety)   Total Score 8 7 8         12/12/2023     8:08 PM   Last PHQ-9   1.  Little interest or pleasure in doing things 1   2.  Feeling down, depressed, or hopeless 1   3.  Trouble falling or staying asleep, or sleeping too much 2   4.  Feeling tired or having little energy 2   5.  Poor appetite or overeating 1   6.  Feeling bad about yourself 1   7.  Trouble concentrating 1   8.  Moving slowly or restless 1   Q9: Thoughts of better off dead/self-harm past 2 weeks 0   PHQ-9 Total Score 10         3/26/2023     6:49 PM   NATANAEL-7    1. Feeling nervous, anxious, or on edge 1   2. Not being able to stop or control worrying 1   3. Worrying too much about different things 1   4. Trouble relaxing 1   5. Being so restless that it is hard to sit still 1   6. Becoming easily annoyed or irritable 2   7. Feeling afraid, as if something awful might happen 1   NATANAEL-7 Total Score 8   If you checked any problems, how difficult have they made it for you to do your work, take care of things at home, or get along with other people? Somewhat difficult       Suicide Assessment Five-step Evaluation and Treatment (SAFE-T)    Asthma Follow-Up    Was ACT completed today?  Yes        12/11/2023     5:37 PM   ACT Total Scores   ACT TOTAL SCORE (Goal Greater than or Equal to 20) 25   In the past 12 months, how many times did you visit the emergency room for your asthma without being admitted to the hospital? 0   In the past 12 months, how many times were you hospitalized overnight because of your asthma? 0         How many days per week do you miss taking your asthma controller medication?  Me yeah  Please describe any recent triggers for your asthma: upper respiratory infections and pollens  Have you had any Emergency Room Visits, Urgent Care Visits, or Hospital Admissions since your last office visit?  No  Migraine   Since your last clinic visit, how have your headaches changed?  No change  How often are you getting headaches or migraines?  1 to 2/month  Are you able to do normal daily activities when you have a migraine? Yes  Are you taking rescue/relief medications? (Select all that apply) Maxalt  How helpful is your rescue/relief medication?  I get total relief  Are you taking any medications to prevent migraines? (Select all that apply)  No  In the past 4 weeks, how often have you gone to urgent care or the emergency room because of your headaches?  0      Social History     Tobacco Use    Smoking status: Never    Smokeless tobacco: Never   Substance Use Topics    Alcohol use: Yes     Comment: 3-4 drinks per month             12/11/2023     5:36 PM   Alcohol Use   Prescreen: >3 drinks/day or >7 drinks/week? No          No data to display              Reviewed orders with patient.  Reviewed health maintenance and updated orders accordingly - Yes  Lab work is in process  Labs reviewed in EPIC  BP Readings from Last 3 Encounters:   12/13/23 129/80   09/14/22 122/83   05/19/22 (P) 116/75    Wt Readings from Last 3 Encounters:   12/13/23 113.4 kg (249 lb 14.4 oz)   09/14/22 110.5 kg (243 lb 9.6 oz)   05/16/22 107.5 kg (237 lb)                  Patient Active Problem List   Diagnosis    Migraine    Anxiety    Mild intermittent asthma without complication    Back pain    Moderate recurrent major depression (H)    Generalized anxiety disorder    Dyslipidemia    Onychomycosis    Therapeutic drug monitoring    Degeneration of thoracic or thoracolumbar intervertebral disc    DDD (degenerative disc disease), cervical     Migraine without aura and without status migrainosus, not intractable    Encounter for surveillance of other contraceptive    Pigmented skin lesion, midback, 3mm    Chronic allergic rhinitis    Skin lesion, left thigh    Chronic right hip pain    Benign essential hypertension    Class 2 severe obesity due to excess calories with serious comorbidity and body mass index (BMI) of 38.0 to 38.9 in adult (H)    Back muscle spasm    Recurrent cold sores    Chronic neck and back pain    S/P foot surgery, right    Irritable bowel syndrome with both constipation and diarrhea    Previous  delivery, delivered    Tubular adenoma of colon     Past Surgical History:   Procedure Laterality Date    BIOPSY      Foot    COLONOSCOPY N/A 2022    Procedure: COLONOSCOPY, FLEXIBLE, WITH LESION REMOVAL USING SNARE;  Surgeon: Miky Castillo MD;  Location: [facility]    COLONOSCOPY WITH CO2 INSUFFLATION N/A 2022    Procedure: COLONOSCOPY, WITH CO2 INSUFFLATION;  Surgeon: Miky Castillo MD;  Location: [facility] OR    EYE SURGERY  2017    Lasik    SURGICAL HISTORY OF -   2007        SURGICAL HISTORY OF -   2006    Rt Foot mass - Perineurioma       Social History     Tobacco Use    Smoking status: Never    Smokeless tobacco: Never   Substance Use Topics    Alcohol use: Yes     Comment: 3-4 drinks per month     Family History   Problem Relation Age of Onset    Heart Disease Father     Prostate Cancer Father     Coronary Artery Disease Father     Diabetes Maternal Grandfather     Heart Disease Paternal Grandfather     Genetic Disorder Daughter         At Birth/ [place]    Genetic Disorder Daughter          Current Outpatient Medications   Medication Sig Dispense Refill    albuterol (PROAIR HFA) 108 (90 Base) MCG/ACT inhaler Inhale 2 puffs into the lungs every 6 hours as needed for shortness of breath or wheezing Profile Rx 18 g 1    buPROPion (WELLBUTRIN XL) 150 MG 24 hr tablet Take 1 tablet (150  mg) by mouth every morning Change in dose 90 tablet 1    celecoxib (CELEBREX) 100 MG capsule Take 1 capsule (100 mg) by mouth 2 times daily as needed for moderate pain 60 capsule 1    citalopram (CELEXA) 20 MG tablet TAKE ONE AND ONE-HALF TABLETS (30 MG) BY MOUTH EVERY EVENING 135 tablet 1    diclofenac (VOLTAREN) 1 % topical gel Place onto the skin 4 times daily (Patient taking differently: Place onto the skin 4 times daily as needed) 100 g 1    etonogestrel-ethinyl estradiol (NUVARING) 0.12-0.015 MG/24HR vaginal ring INSERT 1 RING VAGINALLY AND LEAVE IN PLACE FOR 21 DAYS THEN REMOVE FOR ONE WEEK. THEN REPEAT WITH NEW RING. 3 each 3    fluconazole (DIFLUCAN) 150 MG tablet Take 2 tablets (300 mg) by mouth every 7 days 8 tablet 2    ibuprofen (ADVIL/MOTRIN) 200 MG tablet Take 200-400 mg by mouth every 4 hours as needed for mild pain      MAGNESIUM PO Take 1 tablet by mouth daily      mometasone (NASONEX) 50 MCG/ACT nasal spray Spray 2 sprays into both nostrils daily as needed (Nasal allergies) Profile prescription 17 g 6    Multiple Vitamins-Minerals (MULTIVITAMIN ADULT PO) Take 1 tablet by mouth daily      nystatin (MYCOSTATIN) 334167 UNIT/GM POWD Use to squirt inside the socks once daily (Patient taking differently: Use to squirt inside the socks once daily PRN) 60 g 3    rizatriptan (MAXALT-MLT) 10 MG ODT Take 1 tablet (10 mg) by mouth See Admin Instructions WITH ONSET OF MIGRAINE, MAY REPEAT ONCE AFTER 2 HOURS. DO NOT EXCEED 3 TABLETS IN 24 HOURS. 6 tablet 6    Semaglutide-Weight Management (WEGOVY) 0.25 MG/0.5ML pen Inject 0.25 mg Subcutaneous once a week 2 mL 0    valACYclovir (VALTREX) 500 MG tablet Take 1 tablet (500 mg) by mouth 2 times daily 6 tablet 4    lisinopril (ZESTRIL) 10 MG tablet Take 1 tablet (10 mg) by mouth daily 90 tablet 3     Allergies   Allergen Reactions    Terbinafine Itching     Recent Labs   Lab Test 08/24/22  0957 02/24/22  0708 10/21/21  0756 03/22/21  0719 02/05/20  6792  11/20/19 1733 11/20/19 1733 03/07/19  0944   *  --   --  118*  --   --   --  126*   HDL 49*  --   --  51  --   --   --  62   TRIG 114  --   --  174*  --   --   --  102   ALT 28 34 39 29  --   --   --   --    CR 0.91  --   --  0.88 0.79   < > 0.69  --    GFRESTIMATED 79  --   --  80 >90   < > >90  --    GFRESTBLACK  --   --   --  >90 >90   < > >90  --    POTASSIUM 4.6  --   --  4.0 4.1   < > 3.8  --    TSH  --   --   --   --   --   --  0.94  --     < > = values in this interval not displayed.        Breast Cancer Screening:    FHS-7:       9/22/2021     5:57 PM 11/17/2021     7:09 AM 9/11/2022     9:26 PM 3/27/2023     1:01 PM 12/11/2023     5:38 PM   Breast CA Risk Assessment (FHS-7)   Did any of your first-degree relatives have breast or ovarian cancer? No No No Yes No   Did any of your relatives have bilateral breast cancer? Yes No Yes No Yes   Did any man in your family have breast cancer? No No No No No   Did any woman in your family have breast and ovarian cancer? Yes No No No No   Did any woman in your family have breast cancer before age 50 y? Yes Yes Yes No Yes   Do you have 2 or more relatives with breast and/or ovarian cancer? No No No No Yes   Do you have 2 or more relatives with breast and/or bowel cancer? No No No No Yes     click delete button to remove this line now  Mammogram Screening: Recommended annual mammography  Pertinent mammograms are reviewed under the imaging tab.    History of abnormal Pap smear: NO - age 30-65 PAP every 5 years with negative HPV co-testing recommended      Latest Ref Rng & Units 9/29/2021     8:03 AM 2/23/2018     1:52 PM 2/23/2018     1:51 PM   PAP / HPV   PAP  Negative for Intraepithelial Lesion or Malignancy (NILM)      PAP (Historical)    NIL    HPV 16 DNA Negative Negative  Negative     HPV 18 DNA Negative Negative  Negative     Other HR HPV Negative Negative  Negative       Reviewed and updated as needed this visit by clinical staff   Tobacco  Allergies   Meds              Reviewed and updated as needed this visit by Provider                   Past Medical History:   Diagnosis Date    Allergic rhinitis     Asthma, intermittent     Back pain     Depression with anxiety     Depressive disorder     Essential hypertension 10/29/2019    Hyperlipidemia LDL goal < 160     Migraine       Past Surgical History:   Procedure Laterality Date    BIOPSY  2005    Foot    COLONOSCOPY N/A 2022    Procedure: COLONOSCOPY, FLEXIBLE, WITH LESION REMOVAL USING SNARE;  Surgeon: Miky Castillo MD;  Location: [facility]    COLONOSCOPY WITH CO2 INSUFFLATION N/A 2022    Procedure: COLONOSCOPY, WITH CO2 INSUFFLATION;  Surgeon: Miky Castillo MD;  Location: [facility]    EYE SURGERY  2017    Lasik    SURGICAL HISTORY OF -   2007        SURGICAL HISTORY OF -   2006    Rt Foot mass - Perineurioma     OB History    Para Term  AB Living   2 2 2 0 0 2   SAB IAB Ectopic Multiple Live Births   0 0 0 0 2      # Outcome Date GA Lbr Presley/2nd Weight Sex Delivery Anes PTL Lv   2 Term      -SEC   INDU   1 Term      -SEC   INDU       Review of Systems   Constitutional:  Negative for chills and fever.   HENT:  Negative for congestion, ear pain, hearing loss and sore throat.    Eyes:  Negative for pain and visual disturbance.   Respiratory:  Negative for cough and shortness of breath.    Cardiovascular:  Negative for chest pain, palpitations and peripheral edema.   Gastrointestinal:  Negative for abdominal pain, constipation, diarrhea, heartburn, hematochezia and nausea.   Breasts:  Positive for tenderness. Negative for breast mass and discharge.   Genitourinary:  Negative for dysuria, frequency, genital sores, hematuria, pelvic pain, urgency, vaginal bleeding and vaginal discharge.   Musculoskeletal:  Positive for arthralgias and myalgias. Negative for joint swelling.   Skin:  Negative for rash.   Neurological:  Positive for headaches.  "Negative for dizziness, weakness and paresthesias.   Psychiatric/Behavioral:  Positive for mood changes. The patient is nervous/anxious.      CONSTITUTIONAL: NEGATIVE for fever, chills, change in weight  INTEGUMENTARU/SKIN: NEGATIVE for worrisome rashes, moles or lesions  EYES: NEGATIVE for vision changes or irritation  ENT: NEGATIVE for ear, mouth and throat problems  ENT: History of allergic rhinitis  RESP: NEGATIVE for significant cough or SOB  RESP: History of asthma  BREAST: NEGATIVE for masses, tenderness or discharge  CV: NEGATIVE for chest pain, palpitations or peripheral edema  CV: History of hypertension  GI: NEGATIVE for nausea, abdominal pain, heartburn, or change in bowel habits  : NEGATIVE for unusual urinary or vaginal symptoms. Periods are regular.  MUSCULOSKELETAL: NEGATIVE for significant arthralgias or myalgia  NEURO: NEGATIVE for weakness, dizziness or paresthesias  NEURO: History of migraine  ENDOCRINE: NEGATIVE for temperature intolerance, skin/hair changes  HEME/ALLERGY/IMMUNE: NEGATIVE for bleeding problems  PSYCHIATRIC: History of depression and anxiety     OBJECTIVE:   /80 (BP Location: Right leg, Patient Position: Sitting, Cuff Size: Adult Large)   Pulse 67   Temp 98.6  F (37  C) (Oral)   Resp 16   Ht 1.727 m (5' 8\")   Wt 113.4 kg (249 lb 14.4 oz)   LMP  (LMP Unknown)   SpO2 96%   BMI 38.00 kg/m    Physical Exam  GENERAL: healthy, alert and no distress  EYES: Eyes grossly normal to inspection, PERRL and conjunctivae and sclerae normal  HENT: ear canals and TM's normal, nose and mouth without ulcers or lesions  NECK: no adenopathy, no asymmetry, masses, or scars and thyroid normal to palpation  RESP: lungs clear to auscultation - no rales, rhonchi or wheezes  BREAST: normal without masses, tenderness or nipple discharge and no palpable axillary masses or adenopathy  CV: regular rate and rhythm, normal S1 S2, no S3 or S4, no murmur, click or rub, no peripheral edema and " peripheral pulses strong  ABDOMEN: soft, nontender, no hepatosplenomegaly, no masses and bowel sounds normal  MS: no gross musculoskeletal defects noted, no edema  SKIN: no suspicious lesions or rashes  NEURO: Normal strength and tone, mentation intact and speech normal  PSYCH: mentation appears normal, affect normal/bright    Diagnostic Test Results:  Labs reviewed in Epic    ASSESSMENT/PLAN:   (Z00.00) Routine general medical examination at a health care facility  (primary encounter diagnosis)  Comment:   Plan: Discussed on regular exercises, daily calcium intake, healthy eating,y and routine dental checks    (J45.20) Mild intermittent asthma without complication  Comment:   Plan: albuterol (PROAIR HFA) 108 (90 Base) MCG/ACT         inhaler        Stable, continue with albuterol inhaler as needed, avoid potential triggers recheck in 1 year or sooner if needed,    (F33.1) Moderate recurrent major depression (H)  Comment:   Plan: EMOTIONAL / BEHAVIORAL ASSESSMENT, buPROPion         (WELLBUTRIN XL) 150 MG 24 hr tablet, citalopram        (CELEXA) 20 MG tablet              9/14/2022     7:26 AM 3/26/2023     6:49 PM 12/12/2023     8:08 PM   PHQ   PHQ-9 Total Score 9 13 10   Q9: Thoughts of better off dead/self-harm past 2 weeks Not at all Not at all Not at all     Patient reported having to go through significant situational stress with the daughter's mental health in the last few weeks  She is feeling better now, in counseling, wants to continue current medications with no change, follow for recheck in 6 months or sooner if needed    (F41.9) Anxiety  Comment:   Plan: buPROPion (WELLBUTRIN XL) 150 MG 24 hr tablet,         citalopram (CELEXA) 20 MG tablet        as above      (I10) Benign essential hypertension  Comment:   Plan: CBC with platelets, Comprehensive metabolic         panel (BMP + Alb, Alk Phos, ALT, AST, Total.         Bili, TP), Albumin Random Urine Quantitative         with Creat Ratio          BP  Readings from Last 6 Encounters:   12/13/23 129/80   09/14/22 122/83   05/19/22 (P) 116/75   02/24/22 122/80   10/21/21 126/80   09/29/21 117/78     Patient has stopped taking the lisinopril due to tickling sensation in the throat she has been checking blood pressures at home with no significant elevation  Will continue to monitor  If she has microalbuminuria today, will start on losartan.    (E66.01,  Z68.38) Class 2 severe obesity due to excess calories with serious comorbidity and body mass index (BMI) of 38.0 to 38.9 in adult (H)  Comment:   Plan: Semaglutide-Weight Management (WEGOVY) 0.25         MG/0.5ML pen        .  Wt Readings from Last 5 Encounters:   12/13/23 113.4 kg (249 lb 14.4 oz)   09/14/22 110.5 kg (243 lb 9.6 oz)   05/16/22 107.5 kg (237 lb)   02/24/22 107 kg (236 lb)   09/29/21 107.8 kg (237 lb 9 oz)     Reviewed progressively worsening weight gain  Will give a trial for Wegovy  Follow-up for recheck on weight either virtually or in the office in 4 to 5 weeks or sooner if needed  Dosing and potential medication side effects discussed.  Continue with efforts on intermittent fasting, healthy eating, portion control, regular exercises  Patient verbalised understanding and is agreeable to the plan.      (Z30.49) Encounter for surveillance of other contraceptive  Comment:   Plan: etonogestrel-ethinyl estradiol (NUVARING)         0.12-0.015 MG/24HR vaginal ring            (J30.9) Chronic allergic rhinitis  Comment:   Plan: Stable, continue with steroid nasal sprays as needed    (G43.709) Chronic migraine without aura without status migrainosus, not intractable  Comment:   Plan: rizatriptan (MAXALT-MLT) 10 MG ODT        Stable, continue with avoiding exposure to potential triggers, Maxalt as needed for episodic migraine headaches, recheck in 6 months    (B00.1) Recurrent cold sores  Comment:   Plan: valACYclovir (VALTREX) 500 MG tablet        Stable, continue with using Valtrex as needed for episodes,  "follow-up in 1 year or sooner if needed  (Z13.0) Screening for deficiency anemia  Comment:   Plan: CBC with platelets            (Z13.1) Screening for diabetes mellitus (DM)  Comment:   Plan: Comprehensive metabolic panel (BMP + Alb, Alk         Phos, ALT, AST, Total. Bili, TP)            (Z13.220) Screening for hyperlipidemia  Comment:   Plan: Lipid panel reflex to direct LDL Fasting            (Z12.11) Colon cancer screening  Comment:   Plan: Reviewed colonoscopy from May 2022 showing tubular adenoma of colon with recommendation to repeat in 5 years in 2027    (D12.6) Tubular adenoma of colon  Comment:   Plan: as above      (Z12.4) Cervical cancer screening  Comment:   Plan: Patient is not due for Pap this year    (Z12.31) Encounter for screening mammogram for malignant neoplasm of breast  Comment:   Plan: Patient is not due for mammogram until March 2024    Patient has been advised of split billing requirements and indicates understanding: Yes      COUNSELING:  Reviewed preventive health counseling, as reflected in patient instructions  Special attention given to:        Regular exercise       Healthy diet/nutrition       Vision screening       Immunizations  Vaccinated for: TDAP           Contraception       Osteoporosis prevention/bone health       Colorectal Cancer Screening       The 10-year ASCVD risk score (Mattie WINTERS, et al., 2019) is: 1.4%    Values used to calculate the score:      Age: 47 years      Sex: Female      Is Non- : No      Diabetic: No      Tobacco smoker: No      Systolic Blood Pressure: 129 mmHg      Is BP treated: Yes      HDL Cholesterol: 49 mg/dL      Total Cholesterol: 193 mg/dL      BMI:   Estimated body mass index is 38 kg/m  as calculated from the following:    Height as of this encounter: 1.727 m (5' 8\").    Weight as of this encounter: 113.4 kg (249 lb 14.4 oz).   Weight management plan: As mentioned above      She reports that she has never smoked. She has " never used smokeless tobacco.      Chart documentation done in part with Dragon Voice recognition Software. Although reviewed after completion, some word and grammatical error may remain.    Nelida Celestin MD  [facility]  Answers submitted by the patient for this visit:  Patient Health Questionnaire (Submitted on 12/12/2023)  If you checked off any problems, how difficult have these problems made it for you to do your work, take care of things at home, or get along with other people?: Very difficult  PHQ9 TOTAL SCORE: 10

## 2023-12-13 NOTE — PROGRESS NOTES
Prior to immunization administration, verified patients identity using patient s name and date of birth. Please see Immunization Activity for additional information.     Screening Questionnaire for Adult Immunization    Are you sick today?   No   Do you have allergies to medications, food, a vaccine component or latex?   No   Have you ever had a serious reaction after receiving a vaccination?   No   Do you have a long-term health problem with heart, lung, kidney, or metabolic disease (e.g., diabetes), asthma, a blood disorder, no spleen, complement component deficiency, a cochlear implant, or a spinal fluid leak?  Are you on long-term aspirin therapy?   No   Do you have cancer, leukemia, HIV/AIDS, or any other immune system problem?   No   Do you have a parent, brother, or sister with an immune system problem?   No   In the past 3 months, have you taken medications that affect  your immune system, such as prednisone, other steroids, or anticancer drugs; drugs for the treatment of rheumatoid arthritis, Crohn s disease, or psoriasis; or have you had radiation treatments?   No   Have you had a seizure, or a brain or other nervous system problem?   No   During the past year, have you received a transfusion of blood or blood    products, or been given immune (gamma) globulin or antiviral drug?   No   For women: Are you pregnant or is there a chance you could become       pregnant during the next month?   No   Have you received any vaccinations in the past 4 weeks?   No     Immunization questionnaire answers were all negative.      Patient instructed to remain in clinic for 15 minutes afterwards, and to report any adverse reactions.     Screening performed by Dawood Berry MA on 12/13/2023 at 8:12 AM.

## 2023-12-14 NOTE — RESULT ENCOUNTER NOTE
Dear Treasure,  Your recent labs showed normal hemoglobin with no concern for anemia, normal fasting blood sugar, liver functions urine exam with no protein leak and  stable cholesterol numbers.  Your blood urea nitrogen which is one of the kidney function tests is slightly elevated likely from dehydration.   Let me know if you have any questions. Take care.  Nelida Celestin MD

## 2023-12-21 ENCOUNTER — PATIENT OUTREACH (OUTPATIENT)
Dept: GASTROENTEROLOGY | Facility: CLINIC | Age: 48
End: 2023-12-21
Payer: COMMERCIAL

## 2024-03-06 ENCOUNTER — PATIENT OUTREACH (OUTPATIENT)
Dept: CARE COORDINATION | Facility: CLINIC | Age: 49
End: 2024-03-06
Payer: COMMERCIAL

## 2024-06-23 ENCOUNTER — HEALTH MAINTENANCE LETTER (OUTPATIENT)
Age: 49
End: 2024-06-23

## 2024-06-23 ASSESSMENT — ASTHMA QUESTIONNAIRES
QUESTION_4 LAST FOUR WEEKS HOW OFTEN HAVE YOU USED YOUR RESCUE INHALER OR NEBULIZER MEDICATION (SUCH AS ALBUTEROL): NOT AT ALL
QUESTION_5 LAST FOUR WEEKS HOW WOULD YOU RATE YOUR ASTHMA CONTROL: COMPLETELY CONTROLLED
QUESTION_1 LAST FOUR WEEKS HOW MUCH OF THE TIME DID YOUR ASTHMA KEEP YOU FROM GETTING AS MUCH DONE AT WORK, SCHOOL OR AT HOME: NONE OF THE TIME
ACT_TOTALSCORE: 25
ACT_TOTALSCORE: 25
QUESTION_3 LAST FOUR WEEKS HOW OFTEN DID YOUR ASTHMA SYMPTOMS (WHEEZING, COUGHING, SHORTNESS OF BREATH, CHEST TIGHTNESS OR PAIN) WAKE YOU UP AT NIGHT OR EARLIER THAN USUAL IN THE MORNING: NOT AT ALL
QUESTION_2 LAST FOUR WEEKS HOW OFTEN HAVE YOU HAD SHORTNESS OF BREATH: NOT AT ALL

## 2024-06-25 ASSESSMENT — PATIENT HEALTH QUESTIONNAIRE - PHQ9
SUM OF ALL RESPONSES TO PHQ QUESTIONS 1-9: 12
10. IF YOU CHECKED OFF ANY PROBLEMS, HOW DIFFICULT HAVE THESE PROBLEMS MADE IT FOR YOU TO DO YOUR WORK, TAKE CARE OF THINGS AT HOME, OR GET ALONG WITH OTHER PEOPLE: SOMEWHAT DIFFICULT
SUM OF ALL RESPONSES TO PHQ QUESTIONS 1-9: 12

## 2024-06-26 ENCOUNTER — VIRTUAL VISIT (OUTPATIENT)
Dept: FAMILY MEDICINE | Facility: CLINIC | Age: 49
End: 2024-06-26
Payer: COMMERCIAL

## 2024-06-26 DIAGNOSIS — F33.1 MODERATE RECURRENT MAJOR DEPRESSION (H): ICD-10-CM

## 2024-06-26 DIAGNOSIS — E66.812 CLASS 2 SEVERE OBESITY DUE TO EXCESS CALORIES WITH SERIOUS COMORBIDITY AND BODY MASS INDEX (BMI) OF 38.0 TO 38.9 IN ADULT (H): ICD-10-CM

## 2024-06-26 DIAGNOSIS — E66.01 CLASS 2 SEVERE OBESITY DUE TO EXCESS CALORIES WITH SERIOUS COMORBIDITY AND BODY MASS INDEX (BMI) OF 38.0 TO 38.9 IN ADULT (H): ICD-10-CM

## 2024-06-26 DIAGNOSIS — F41.9 ANXIETY: ICD-10-CM

## 2024-06-26 DIAGNOSIS — G43.709 CHRONIC MIGRAINE WITHOUT AURA WITHOUT STATUS MIGRAINOSUS, NOT INTRACTABLE: ICD-10-CM

## 2024-06-26 DIAGNOSIS — J45.20 MILD INTERMITTENT ASTHMA WITHOUT COMPLICATION: ICD-10-CM

## 2024-06-26 PROCEDURE — G2211 COMPLEX E/M VISIT ADD ON: HCPCS | Mod: 95 | Performed by: FAMILY MEDICINE

## 2024-06-26 PROCEDURE — 96127 BRIEF EMOTIONAL/BEHAV ASSMT: CPT | Mod: 95 | Performed by: FAMILY MEDICINE

## 2024-06-26 PROCEDURE — 99214 OFFICE O/P EST MOD 30 MIN: CPT | Mod: 95 | Performed by: FAMILY MEDICINE

## 2024-06-26 RX ORDER — CITALOPRAM HYDROBROMIDE 20 MG/1
TABLET ORAL
Qty: 135 TABLET | Refills: 1 | Status: SHIPPED | OUTPATIENT
Start: 2024-06-26

## 2024-06-26 RX ORDER — RIZATRIPTAN BENZOATE 10 MG/1
10 TABLET, ORALLY DISINTEGRATING ORAL SEE ADMIN INSTRUCTIONS
Qty: 6 TABLET | Refills: 6 | Status: SHIPPED | OUTPATIENT
Start: 2024-06-26

## 2024-06-26 RX ORDER — BUPROPION HYDROCHLORIDE 150 MG/1
150 TABLET ORAL EVERY MORNING
Qty: 90 TABLET | Refills: 1 | Status: SHIPPED | OUTPATIENT
Start: 2024-06-26

## 2024-06-26 NOTE — LETTER
My Asthma Action Plan    Name: Treasure Pradhan   YOB: 1975  Date: 6/26/2024   My doctor: Nelida Celestin MD   My clinic: [facility]        My Rescue Medicine:   Albuterol inhaler (Proair/Ventolin/Proventil HFA)  2-4 puffs EVERY 4 HOURS as needed. Use a spacer if recommended by your provider.   My Asthma Severity:   Intermittent / Exercise Induced  Know your asthma triggers: upper respiratory infections  upper respiratory infections  pollens          GREEN ZONE   Good Control  I feel good  No cough or wheeze  Can work, sleep and play without asthma symptoms       Take your asthma control medicine every day.     If exercise triggers your asthma, take your rescue medication  15 minutes before exercise or sports, and  During exercise if you have asthma symptoms  Spacer to use with inhaler: If you have a spacer, make sure to use it with your inhaler             YELLOW ZONE Getting Worse  I have ANY of these:  I do not feel good  Cough or wheeze  Chest feels tight  Wake up at night   Keep taking your Green Zone medications  Start taking your rescue medicine:  every 20 minutes for up to 1 hour. Then every 4 hours for 24-48 hours.  If you stay in the Yellow Zone for more than 12-24 hours, contact your doctor.  If you do not return to the Green Zone in 12-24 hours or you get worse, start taking your oral steroid medicine if prescribed by your provider.           RED ZONE Medical Alert - Get Help  I have ANY of these:  I feel awful  Medicine is not helping  Breathing getting harder  Trouble walking or talking  Nose opens wide to breathe       Take your rescue medicine NOW  If your provider has prescribed an oral steroid medicine, start taking it NOW  Call your doctor NOW  If you are still in the Red Zone after 20 minutes and you have not reached your doctor:  Take your rescue medicine again and  Call 911 or go to the emergency room right away    See your regular doctor within 2  weeks of an Emergency Room or Urgent Care visit for follow-up treatment.          Annual Reminders:  Meet with Asthma Educator,  Flu Shot in the Fall, consider Pneumonia Vaccination for patients with asthma (aged 19 and older).    Pharmacy:    [id number] - [place], [place] - [address] [facility] [place] [address]  [facility] [place], [place] [address]  [facility] - [place], [place] - [id number]  [facility] - [place], [place] - [address]    Electronically signed by Nelida Celestin MD   Date: 06/26/24                    Asthma Triggers  How To Control Things That Make Your Asthma Worse    Triggers are things that make your asthma worse.  Look at the list below to help you find your triggers and   what you can do about them. You can help prevent asthma flare-ups by staying away from your triggers.      Trigger                                                          What you can do   Cigarette Smoke  Tobacco smoke can make asthma worse. Do not allow smoking in your home, car or around you.  Be sure no one smokes at a child s day care or school.  If you smoke, ask your health care provider for ways to help you quit.  Ask family members to quit too.  Ask your health care provider for a referral to Quit Plan to help you quit smoking, or call 2-728-177-PLAN.     Colds, Flu, Bronchitis  These are common triggers of asthma. Wash your hands often.  Don t touch your eyes, nose or mouth.  Get a flu shot every year.     Dust Mites  These are tiny bugs that live in cloth or carpet. They are too small to see. Wash sheets and blankets in hot water every week.   Encase pillows and mattress in dust mite proof covers.  Avoid having carpet if you can. If you have carpet, vacuum weekly.   Use a dust mask and HEPA vacuum.   Pollen and Outdoor Mold  Some people are allergic to trees,  grass, or weed pollen, or molds. Try to keep your windows closed.  Limit time out doors when pollen count is high.   Ask you health care provider about taking medicine during allergy season.     Animal Dander  Some people are allergic to skin flakes, urine or saliva from pets with fur or feathers. Keep pets with fur or feathers out of your home.    If you can t keep the pet outdoors, then keep the pet out of your bedroom.  Keep the bedroom door closed.  Keep pets off cloth furniture and away from stuffed toys.     Mice, Rats, and Cockroaches  Some people are allergic to the waste from these pests.   Cover food and garbage.  Clean up spills and food crumbs.  Store grease in the refrigerator.   Keep food out of the bedroom.   Indoor Mold  This can be a trigger if your home has high moisture. Fix leaking faucets, pipes, or other sources of water.   Clean moldy surfaces.  Dehumidify basement if it is damp and smelly.   Smoke, Strong Odors, and Sprays  These can reduce air quality. Stay away from strong odors and sprays, such as perfume, powder, hair spray, paints, smoke incense, paint, cleaning products, candles and new carpet.   Exercise or Sports  Some people with asthma have this trigger. Be active!  Ask your doctor about taking medicine before sports or exercise to prevent symptoms.    Warm up for 5-10 minutes before and after sports or exercise.     Other Triggers of Asthma  Cold air:  Cover your nose and mouth with a scarf.  Sometimes laughing or crying can be a trigger.  Some medicines and food can trigger asthma.

## 2024-06-26 NOTE — PROGRESS NOTES
"    Instructions Relayed to Patient by Virtual Roomer:     Patient is active on eriQoo:   Relayed following to patient: \"It looks like you are active on eriQoo, are you able to join the visit this way? If not, do you need us to send you a link now or would you like your provider to send a link via text or email when they are ready to initiate the visit?\"      Patient Confirmed they will join visit via: [employer]  Reminded patient to ensure they were logged on to virtual visit by arrival time listed.   Asked if patient has flexibility to initiate visit sooner than arrival time: patient is unable to initiate visit earlier than arrival time     If pediatric virtual visit, ensured pediatric patient along with parent/guardian will be present for video visit.     Patient offered the website [email or web address] and/or phone number to eriQoo Help line: [phone]     Treasure is a 48 year old who is being evaluated via a billable video visit.    How would you like to obtain your AVS? [employer]bra [employer]  If the video visit is dropped, the invitation should be resent by: Text to cell phone: [phone]  Will anyone else be joining your video visit? No      Assessment & Plan     Moderate recurrent major depression (H)      6/16/2024     7:10 PM 6/16/2024     7:11 PM 6/25/2024    11:03 PM   PHQ   PHQ-9 Total Score 12 12 12   Q9: Thoughts of better off dead/self-harm past 2 weeks Not at all Not at all Not at all     Slightly worsened from recent situational stressors   considered dose adjustment on current medications  Patient is reluctant and wants to wait  Continue with psychotherapy, current dose of Wellbutrin and Celexa  Follow-up for recheck in 6 months at the time of physical or sooner if needed  - citalopram (CELEXA) 20 MG tablet; TAKE ONE AND ONE-HALF TABLETS BY MOUTH EVERY EVENING  - buPROPion (WELLBUTRIN XL) 150 MG 24 hr tablet; Take 1 tablet (150 mg) by mouth every morning  - EMOTIONAL / BEHAVIORAL " "ASSESSMENT    Anxiety  as above    - citalopram (CELEXA) 20 MG tablet; TAKE ONE AND ONE-HALF TABLETS BY MOUTH EVERY EVENING  - buPROPion (WELLBUTRIN XL) 150 MG 24 hr tablet; Take 1 tablet (150 mg) by mouth every morning    Class 2 severe obesity due to excess calories with serious comorbidity and body mass index (BMI) of 38.0 to 38.9 in adult (H)  Wt Readings from Last 5 Encounters:   12/13/23 113.4 kg (249 lb 14.4 oz)   09/14/22 110.5 kg (243 lb 9.6 oz)   05/16/22 107.5 kg (237 lb)   02/24/22 107 kg (236 lb)   09/29/21 107.8 kg (237 lb 9 oz)     Patient reportedly lost 10 pounds in the past 6 months  Per insurance, recommend patient to enroll in [facility] weight loss program  Patient verbalised understanding and is agreeable to the plan.    - Med Therapy Management Referral    Chronic migraine without aura without status migrainosus, not intractable  Stable, continue to avoid potential triggers for migraine, Maxalt as needed, recheck in 6 months or sooner if needed  - rizatriptan (MAXALT-MLT) 10 MG ODT; Take 1 tablet (10 mg) by mouth See Admin Instructions WITH ONSET OF MIGRAINE, MAY REPEAT ONCE AFTER 2 HOURS. DO NOT EXCEED 3 TABLETS IN 24 HOURS.    Mild intermittent asthma without complication  Stable, patient does not need refills on the albuterol inhaler at this time  - Asthma Action Plan (AAP)          BMI  Estimated body mass index is 38 kg/m  as calculated from the following:    Height as of 12/13/23: 1.727 m (5' 8\").    Weight as of 12/13/23: 113.4 kg (249 lb 14.4 oz).   Weight management plan: Patient has [employer] employee Insurance plan and was referred to the [facility] Weight Management Program GLP-1 Weight Loss RX Criteria      Chart documentation done in part with Dragon Voice recognition Software. Although reviewed after completion, some word and grammatical error may remain.    See Patient Instructions    Cathy Amanda is a 48 year old, presenting for the following health issues:  Patient is here for a " video visit instead of in person visit due to the current COVID-19 pandemic.    Recheck Medication (Citalopram & Wellbutrin /)      Via the Health Maintenance questionnaire, the patient has reported the following services have been completed -Mammogram: [facility] 2023-03-27, this information has not been sent to the abstraction team.  History of Present Illness       Mental Health Follow-up:  Patient presents to follow-up on Depression & Anxiety.Patient's depression since last visit has been:  No change  The patient is having other symptoms associated with depression.  Patient's anxiety since last visit has been:  Worse  The patient is having other symptoms associated with anxiety.  Any significant life events: health concerns  Patient is feeling anxious or having panic attacks.  Patient has no concerns about alcohol or drug use.    Reason for visit:  6 month check up    She eats 2-3 servings of fruits and vegetables daily.She consumes 1 sweetened beverage(s) daily.She exercises with enough effort to increase her heart rate 10 to 19 minutes per day.  She exercises with enough effort to increase her heart rate 4 days per week. She is missing 1 dose(s) of medications per week.       Medication Followup of  Citalopram & Wellbutrin   Taking Medication as prescribed: NO  Side Effects:  None  Medication Helping Symptoms:  yes  Depression and Anxiety   How are you doing with your depression since your last visit?  Slightly worsened due to recent increase in situational stresses, but feeling better  How are you doing with your anxiety since your last visit?  As above  Are you having other symptoms that might be associated with depression or anxiety?  See last PHQ-9, NATANAEL-7  Have you had a significant life event? OTHER: Daughter's health    Do you have any concerns with your use of alcohol or other drugs? No    Social History     Tobacco Use    Smoking status: Never    Smokeless tobacco: Never   Vaping Use    Vaping status:  Never Used   Substance Use Topics    Alcohol use: Yes     Comment: 3-4 drinks per month    Drug use: Never         6/16/2024     7:10 PM 6/16/2024     7:11 PM 6/25/2024    11:03 PM   PHQ   PHQ-9 Total Score 12 12 12   Q9: Thoughts of better off dead/self-harm past 2 weeks Not at all Not at all Not at all         4/5/2022     8:00 PM 3/26/2023     6:49 PM 6/16/2024     7:11 PM   NATANAEL-7 SCORE   Total Score 7 (mild anxiety) 8 (mild anxiety) 10 (moderate anxiety)   Total Score 7 8 10         6/25/2024    11:03 PM   Last PHQ-9   1.  Little interest or pleasure in doing things 1   2.  Feeling down, depressed, or hopeless 1   3.  Trouble falling or staying asleep, or sleeping too much 3   4.  Feeling tired or having little energy 3   5.  Poor appetite or overeating 1   6.  Feeling bad about yourself 1   7.  Trouble concentrating 1   8.  Moving slowly or restless 1   Q9: Thoughts of better off dead/self-harm past 2 weeks 0   PHQ-9 Total Score 12         6/16/2024     7:11 PM   NATANAEL-7    1. Feeling nervous, anxious, or on edge 1   2. Not being able to stop or control worrying 3   3. Worrying too much about different things 1   4. Trouble relaxing 1   5. Being so restless that it is hard to sit still 1   6. Becoming easily annoyed or irritable 1   7. Feeling afraid, as if something awful might happen 2   NATANAEL-7 Total Score 10   If you checked any problems, how difficult have they made it for you to do your work, take care of things at home, or get along with other people? Somewhat difficult       Suicide Assessment Five-step Evaluation and Treatment (SAFE-T)    Asthma Follow-Up    Was ACT completed today?  Yes        6/23/2024     9:18 PM   ACT Total Scores   ACT TOTAL SCORE (Goal Greater than or Equal to 20) 25   In the past 12 months, how many times did you visit the emergency room for your asthma without being admitted to the hospital? 0   In the past 12 months, how many times were you hospitalized overnight because of your  asthma? 0        How many days per week do you miss taking your asthma controller medication?  I do not have an asthma controller medication  Please describe any recent triggers for your asthma: upper respiratory infections and pollens  Have you had any Emergency Room Visits, Urgent Care Visits, or Hospital Admissions since your last office visit?  No  Migraine   Since your last clinic visit, how have your headaches changed?  No change  How often are you getting headaches or migraines?  Once a month  Are you able to do normal daily activities when you have a migraine? Yes  Are you taking rescue/relief medications? (Select all that apply) Maxalt  How helpful is your rescue/relief medication?  I get total relief  Are you taking any medications to prevent migraines? (Select all that apply)  No  In the past 4 weeks, how often have you gone to urgent care or the emergency room because of your headaches?  0        Review of Systems  CONSTITUTIONAL: NEGATIVE for fever, chills, change in weight  RESP: NEGATIVE for significant cough or SOB  RESP: History of asthma  CV: NEGATIVE for chest pain, palpitations or peripheral edema  GI: NEGATIVE for nausea, abdominal pain, heartburn, or change in bowel habits  MUSCULOSKELETAL: NEGATIVE for significant arthralgias or myalgia  NEURO: History of migraine  ENDOCRINE: NEGATIVE for temperature intolerance, skin/hair changes  HEME/ALLERGY/IMMUNE: NEGATIVE for bleeding problems  PSYCHIATRIC: History of depression and anxiety      Objective           Vitals:  No vitals were obtained today due to virtual visit.    Physical Exam   GENERAL: alert and no distress  EYES: Eyes grossly normal to inspection  RESP: No audible wheeze, cough, or visible cyanosis.    NEURO: Cranial nerves grossly intact.  Mentation and speech appropriate for age.  PSYCH: Appropriate affect, tone, and pace of words          Video-Visit Details    Type of service:  Video Visit   Originating Location (pt. Location):  Home    Distant Location (provider location):  Off-site  Platform used for Video Visit: Ruddy  Signed Electronically by: Nelida Celestin MD

## 2024-06-27 ENCOUNTER — TELEPHONE (OUTPATIENT)
Dept: FAMILY MEDICINE | Facility: CLINIC | Age: 49
End: 2024-06-27
Payer: COMMERCIAL

## 2024-06-27 DIAGNOSIS — E66.812 CLASS 2 SEVERE OBESITY DUE TO EXCESS CALORIES WITH SERIOUS COMORBIDITY AND BODY MASS INDEX (BMI) OF 38.0 TO 38.9 IN ADULT (H): Primary | ICD-10-CM

## 2024-06-27 DIAGNOSIS — E66.01 CLASS 2 SEVERE OBESITY DUE TO EXCESS CALORIES WITH SERIOUS COMORBIDITY AND BODY MASS INDEX (BMI) OF 38.0 TO 38.9 IN ADULT (H): Primary | ICD-10-CM

## 2024-06-27 NOTE — TELEPHONE ENCOUNTER
Hello,    We received a referral for this patient to schedule an MTM appointment for the [employer] employee weight management program. In order for us to schedule the visit, the patient has to meet either one of the two clinical criteria per insurance guidelines for 2024:    BMI over 40kg at start of medication  2.   BMI over 30kg at start of medication-with diagnosis of non-alcoholic fatty liver    It does not look like the patient qualifies based on the criteria in the chart. We have been instructed to refer patients back to the provider for alternative options if they do not meet criteria as we would be unable to schedule them an MTM visit with weight management. If the patient does meet criteria, we would need that documentation updated in the referral notes.    Thank you,    Chasity Medina  MTM

## 2024-06-28 NOTE — TELEPHONE ENCOUNTER
Please call patient and explain the information from MTM as mentioned below  I recommend to proceed with ultrasound abdomen to look for hepatic pathology  Order placed, please inform patient to call to schedule

## 2024-06-28 NOTE — TELEPHONE ENCOUNTER
This writer attempted to contact the patient on 06/28/24  Reason for call relay message from provider and left message.    If patient calls back:   Relay message from provider, give pt imaging scheduling number [phone], document that pt called and close encounter    Misti Nicholas, RN, BSN  [facility]     Please call patient and explain the information from MTM as mentioned below  I recommend to proceed with ultrasound abdomen to look for hepatic pathology  Order placed, please inform patient to call to schedule Nelida Celestin MD

## 2024-06-28 NOTE — TELEPHONE ENCOUNTER
Pt returning call.    Message relayed from provider below.    Pt provided with imaging scheduling number and will call to schedule US. No further questions at this time.    Misti Nicholas RN, BSN  [facility]

## 2024-07-11 ENCOUNTER — ANCILLARY PROCEDURE (OUTPATIENT)
Dept: MAMMOGRAPHY | Facility: CLINIC | Age: 49
End: 2024-07-11
Attending: FAMILY MEDICINE
Payer: COMMERCIAL

## 2024-07-11 DIAGNOSIS — Z12.31 VISIT FOR SCREENING MAMMOGRAM: ICD-10-CM

## 2024-07-11 PROCEDURE — 77063 BREAST TOMOSYNTHESIS BI: CPT

## 2024-08-19 ENCOUNTER — ANCILLARY PROCEDURE (OUTPATIENT)
Dept: ULTRASOUND IMAGING | Facility: CLINIC | Age: 49
End: 2024-08-19
Attending: FAMILY MEDICINE
Payer: COMMERCIAL

## 2024-08-19 DIAGNOSIS — E66.01 CLASS 2 SEVERE OBESITY DUE TO EXCESS CALORIES WITH SERIOUS COMORBIDITY AND BODY MASS INDEX (BMI) OF 38.0 TO 38.9 IN ADULT (H): ICD-10-CM

## 2024-08-19 DIAGNOSIS — E66.812 CLASS 2 SEVERE OBESITY DUE TO EXCESS CALORIES WITH SERIOUS COMORBIDITY AND BODY MASS INDEX (BMI) OF 38.0 TO 38.9 IN ADULT (H): ICD-10-CM

## 2024-08-19 PROCEDURE — 76700 US EXAM ABDOM COMPLETE: CPT | Performed by: RADIOLOGY

## 2024-08-26 ENCOUNTER — TELEPHONE (OUTPATIENT)
Dept: FAMILY MEDICINE | Facility: CLINIC | Age: 49
End: 2024-08-26
Payer: COMMERCIAL

## 2024-08-26 NOTE — TELEPHONE ENCOUNTER
MTM referral from: [place] clinic visit (referral by provider)    MTM referral outreach attempt #3 on August 26, 2024 at 8:55 AM      Outcome: Left Message    Use umr fv emp grp for the carrier/Plan on the flowsheet      Twitt2go Message Sent    Lisa Haji CMA  MTM

## 2024-11-13 ENCOUNTER — PATIENT OUTREACH (OUTPATIENT)
Dept: CARE COORDINATION | Facility: CLINIC | Age: 49
End: 2024-11-13
Payer: COMMERCIAL

## 2024-12-09 ENCOUNTER — PATIENT OUTREACH (OUTPATIENT)
Dept: CARE COORDINATION | Facility: CLINIC | Age: 49
End: 2024-12-09
Payer: COMMERCIAL

## 2024-12-12 DIAGNOSIS — Z30.49 ENCOUNTER FOR SURVEILLANCE OF OTHER CONTRACEPTIVE: ICD-10-CM

## 2024-12-12 RX ORDER — ETONOGESTREL AND ETHINYL ESTRADIOL VAGINAL RING .015; .12 MG/D; MG/D
RING VAGINAL
Qty: 3 EACH | Refills: 1 | Status: SHIPPED | OUTPATIENT
Start: 2024-12-12

## 2025-02-13 ENCOUNTER — OFFICE VISIT (OUTPATIENT)
Dept: FAMILY MEDICINE | Facility: CLINIC | Age: 50
End: 2025-02-13
Payer: COMMERCIAL

## 2025-02-13 VITALS
HEIGHT: 68 IN | SYSTOLIC BLOOD PRESSURE: 120 MMHG | RESPIRATION RATE: 16 BRPM | OXYGEN SATURATION: 97 % | DIASTOLIC BLOOD PRESSURE: 70 MMHG | HEART RATE: 69 BPM | BODY MASS INDEX: 37.07 KG/M2 | TEMPERATURE: 99 F | WEIGHT: 244.6 LBS

## 2025-02-13 DIAGNOSIS — H10.32 ACUTE BACTERIAL CONJUNCTIVITIS OF LEFT EYE: Primary | ICD-10-CM

## 2025-02-13 RX ORDER — POLYMYXIN B SULFATE AND TRIMETHOPRIM 1; 10000 MG/ML; [USP'U]/ML
1-2 SOLUTION OPHTHALMIC EVERY 4 HOURS
Qty: 10 ML | Refills: 0 | Status: SHIPPED | OUTPATIENT
Start: 2025-02-13

## 2025-02-13 ASSESSMENT — ENCOUNTER SYMPTOMS: EYE PAIN: 1

## 2025-02-13 ASSESSMENT — ASTHMA QUESTIONNAIRES
QUESTION_4 LAST FOUR WEEKS HOW OFTEN HAVE YOU USED YOUR RESCUE INHALER OR NEBULIZER MEDICATION (SUCH AS ALBUTEROL): NOT AT ALL
ACT_TOTALSCORE: 25
QUESTION_3 LAST FOUR WEEKS HOW OFTEN DID YOUR ASTHMA SYMPTOMS (WHEEZING, COUGHING, SHORTNESS OF BREATH, CHEST TIGHTNESS OR PAIN) WAKE YOU UP AT NIGHT OR EARLIER THAN USUAL IN THE MORNING: NOT AT ALL
QUESTION_2 LAST FOUR WEEKS HOW OFTEN HAVE YOU HAD SHORTNESS OF BREATH: NOT AT ALL
ACT_TOTALSCORE: 25
QUESTION_1 LAST FOUR WEEKS HOW MUCH OF THE TIME DID YOUR ASTHMA KEEP YOU FROM GETTING AS MUCH DONE AT WORK, SCHOOL OR AT HOME: NONE OF THE TIME
QUESTION_5 LAST FOUR WEEKS HOW WOULD YOU RATE YOUR ASTHMA CONTROL: COMPLETELY CONTROLLED

## 2025-02-13 ASSESSMENT — PATIENT HEALTH QUESTIONNAIRE - PHQ9
SUM OF ALL RESPONSES TO PHQ QUESTIONS 1-9: 14
SUM OF ALL RESPONSES TO PHQ QUESTIONS 1-9: 14
10. IF YOU CHECKED OFF ANY PROBLEMS, HOW DIFFICULT HAVE THESE PROBLEMS MADE IT FOR YOU TO DO YOUR WORK, TAKE CARE OF THINGS AT HOME, OR GET ALONG WITH OTHER PEOPLE: SOMEWHAT DIFFICULT

## 2025-02-13 ASSESSMENT — PAIN SCALES - GENERAL: PAINLEVEL_OUTOF10: MILD PAIN (2)

## 2025-02-13 NOTE — PROGRESS NOTES
"  Assessment & Plan     Acute bacterial conjunctivitis of left eye  Discussed with patient the indication and use of medication(s), risks/benefits, and potential adverse side effects.  Patient/guardian verbalized understanding and agreement with the plan.   Supportive cares discussed.  - polymixin b-trimethoprim (POLYTRIM) 42429-3.1 UNIT/ML-% ophthalmic solution; Place 1-2 drops Into the left eye every 4 hours.          BMI  Estimated body mass index is 37.19 kg/m  as calculated from the following:    Height as of this encounter: 1.727 m (5' 8\").    Weight as of this encounter: 110.9 kg (244 lb 9.6 oz).           Subjective   Treasure is a 49 year old, presenting for the following health issues:  Eye Problem (Left eye, 2 days, watery  )      2/13/2025    12:37 PM   Additional Questions   Roomed by Caitlin BUENROSTRO     Eye Problem     History of Present Illness       Reason for visit:  Eye pain swelling and redness  Symptom onset:  1-3 days ago   She is taking medications regularly.     Watery left eye started yesterday and now is having stinging sensation and redness to left eye.  Does not wear contacts.  No blurry vision.  No recent cold symptoms.  The right eye is not affected.  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                Objective    /70 (BP Location: Right arm, Patient Position: Sitting, Cuff Size: Adult Large)   Pulse 69   Temp 99  F (37.2  C) (Oral)   Resp 16   Ht 1.727 m (5' 8\")   Wt 110.9 kg (244 lb 9.6 oz)   SpO2 97%   BMI 37.19 kg/m    Body mass index is 37.19 kg/m .  Physical Exam   GENERAL: healthy, alert, no distress, and obese  EYES: eyelids- no styes, conjunctiva/corneas- conjunctival injection left eye  HENT: ear canals and TM's normal, nose and mouth without ulcers or lesions  NECK: no adenopathy  RESP: lungs clear to auscultation - no rales, rhonchi or wheezes  CV: regular rates and rhythm, normal S1 S2, no S3 or S4, and no murmur, click or rub  PSYCH: " mentation appears normal, affect normal/bright            Signed Electronically by: Eugenia Rudolph, NP

## 2025-03-17 SDOH — HEALTH STABILITY: PHYSICAL HEALTH: ON AVERAGE, HOW MANY DAYS PER WEEK DO YOU ENGAGE IN MODERATE TO STRENUOUS EXERCISE (LIKE A BRISK WALK)?: 1 DAY

## 2025-03-17 SDOH — HEALTH STABILITY: PHYSICAL HEALTH: ON AVERAGE, HOW MANY MINUTES DO YOU ENGAGE IN EXERCISE AT THIS LEVEL?: 20 MIN

## 2025-03-17 ASSESSMENT — PATIENT HEALTH QUESTIONNAIRE - PHQ9
10. IF YOU CHECKED OFF ANY PROBLEMS, HOW DIFFICULT HAVE THESE PROBLEMS MADE IT FOR YOU TO DO YOUR WORK, TAKE CARE OF THINGS AT HOME, OR GET ALONG WITH OTHER PEOPLE: SOMEWHAT DIFFICULT
SUM OF ALL RESPONSES TO PHQ QUESTIONS 1-9: 14
SUM OF ALL RESPONSES TO PHQ QUESTIONS 1-9: 14

## 2025-03-17 ASSESSMENT — ASTHMA QUESTIONNAIRES
ACT_TOTALSCORE: 25
QUESTION_5 LAST FOUR WEEKS HOW WOULD YOU RATE YOUR ASTHMA CONTROL: COMPLETELY CONTROLLED
ACT_TOTALSCORE: 25
QUESTION_4 LAST FOUR WEEKS HOW OFTEN HAVE YOU USED YOUR RESCUE INHALER OR NEBULIZER MEDICATION (SUCH AS ALBUTEROL): NOT AT ALL
QUESTION_1 LAST FOUR WEEKS HOW MUCH OF THE TIME DID YOUR ASTHMA KEEP YOU FROM GETTING AS MUCH DONE AT WORK, SCHOOL OR AT HOME: NONE OF THE TIME
QUESTION_3 LAST FOUR WEEKS HOW OFTEN DID YOUR ASTHMA SYMPTOMS (WHEEZING, COUGHING, SHORTNESS OF BREATH, CHEST TIGHTNESS OR PAIN) WAKE YOU UP AT NIGHT OR EARLIER THAN USUAL IN THE MORNING: NOT AT ALL
QUESTION_2 LAST FOUR WEEKS HOW OFTEN HAVE YOU HAD SHORTNESS OF BREATH: NOT AT ALL

## 2025-03-17 ASSESSMENT — SOCIAL DETERMINANTS OF HEALTH (SDOH): HOW OFTEN DO YOU GET TOGETHER WITH FRIENDS OR RELATIVES?: ONCE A WEEK

## 2025-03-18 ENCOUNTER — OFFICE VISIT (OUTPATIENT)
Dept: FAMILY MEDICINE | Facility: CLINIC | Age: 50
End: 2025-03-18
Payer: COMMERCIAL

## 2025-03-18 VITALS
DIASTOLIC BLOOD PRESSURE: 88 MMHG | WEIGHT: 237.38 LBS | OXYGEN SATURATION: 97 % | RESPIRATION RATE: 16 BRPM | BODY MASS INDEX: 35.98 KG/M2 | TEMPERATURE: 98.4 F | HEIGHT: 68 IN | SYSTOLIC BLOOD PRESSURE: 125 MMHG | HEART RATE: 70 BPM

## 2025-03-18 DIAGNOSIS — Z13.0 SCREENING FOR DEFICIENCY ANEMIA: ICD-10-CM

## 2025-03-18 DIAGNOSIS — Z13.220 SCREENING FOR HYPERLIPIDEMIA: ICD-10-CM

## 2025-03-18 DIAGNOSIS — E66.812 CLASS 2 SEVERE OBESITY DUE TO EXCESS CALORIES WITH SERIOUS COMORBIDITY AND BODY MASS INDEX (BMI) OF 36.0 TO 36.9 IN ADULT (H): ICD-10-CM

## 2025-03-18 DIAGNOSIS — Z30.49 ENCOUNTER FOR SURVEILLANCE OF OTHER CONTRACEPTIVE: ICD-10-CM

## 2025-03-18 DIAGNOSIS — Z13.1 SCREENING FOR DIABETES MELLITUS (DM): ICD-10-CM

## 2025-03-18 DIAGNOSIS — J30.9 CHRONIC ALLERGIC RHINITIS: ICD-10-CM

## 2025-03-18 DIAGNOSIS — Z13.29 SCREENING FOR THYROID DISORDER: ICD-10-CM

## 2025-03-18 DIAGNOSIS — Z12.4 CERVICAL CANCER SCREENING: ICD-10-CM

## 2025-03-18 DIAGNOSIS — E66.01 CLASS 2 SEVERE OBESITY DUE TO EXCESS CALORIES WITH SERIOUS COMORBIDITY AND BODY MASS INDEX (BMI) OF 36.0 TO 36.9 IN ADULT (H): ICD-10-CM

## 2025-03-18 DIAGNOSIS — Z00.00 ROUTINE GENERAL MEDICAL EXAMINATION AT A HEALTH CARE FACILITY: Primary | ICD-10-CM

## 2025-03-18 DIAGNOSIS — D69.6 LOW PLATELET COUNT: ICD-10-CM

## 2025-03-18 DIAGNOSIS — Z71.89 ADVANCE CARE PLANNING: ICD-10-CM

## 2025-03-18 DIAGNOSIS — G43.709 CHRONIC MIGRAINE WITHOUT AURA WITHOUT STATUS MIGRAINOSUS, NOT INTRACTABLE: ICD-10-CM

## 2025-03-18 DIAGNOSIS — N95.1 MENOPAUSAL SYNDROME (HOT FLASHES): ICD-10-CM

## 2025-03-18 DIAGNOSIS — F41.9 ANXIETY: ICD-10-CM

## 2025-03-18 DIAGNOSIS — G47.19 EXCESSIVE DAYTIME SLEEPINESS: ICD-10-CM

## 2025-03-18 DIAGNOSIS — Z12.11 COLON CANCER SCREENING: ICD-10-CM

## 2025-03-18 DIAGNOSIS — R06.83 SNORING: ICD-10-CM

## 2025-03-18 DIAGNOSIS — J45.20 MILD INTERMITTENT ASTHMA WITHOUT COMPLICATION: ICD-10-CM

## 2025-03-18 DIAGNOSIS — F33.1 MODERATE RECURRENT MAJOR DEPRESSION (H): ICD-10-CM

## 2025-03-18 DIAGNOSIS — B00.1 RECURRENT COLD SORES: ICD-10-CM

## 2025-03-18 DIAGNOSIS — Z12.31 ENCOUNTER FOR SCREENING MAMMOGRAM FOR MALIGNANT NEOPLASM OF BREAST: ICD-10-CM

## 2025-03-18 LAB
ERYTHROCYTE [DISTWIDTH] IN BLOOD BY AUTOMATED COUNT: 16.4 % (ref 10–15)
HCT VFR BLD AUTO: 39.1 % (ref 35–47)
HGB BLD-MCNC: 12.3 G/DL (ref 11.7–15.7)
MCH RBC QN AUTO: 27.5 PG (ref 26.5–33)
MCHC RBC AUTO-ENTMCNC: 31.5 G/DL (ref 31.5–36.5)
MCV RBC AUTO: 88 FL (ref 78–100)
PLATELET # BLD AUTO: 461 10E3/UL (ref 150–450)
RBC # BLD AUTO: 4.47 10E6/UL (ref 3.8–5.2)
WBC # BLD AUTO: 6.9 10E3/UL (ref 4–11)

## 2025-03-18 PROCEDURE — 90471 IMMUNIZATION ADMIN: CPT | Performed by: FAMILY MEDICINE

## 2025-03-18 PROCEDURE — 80061 LIPID PANEL: CPT | Performed by: FAMILY MEDICINE

## 2025-03-18 PROCEDURE — 84443 ASSAY THYROID STIM HORMONE: CPT | Performed by: FAMILY MEDICINE

## 2025-03-18 PROCEDURE — 3074F SYST BP LT 130 MM HG: CPT | Performed by: FAMILY MEDICINE

## 2025-03-18 PROCEDURE — 99396 PREV VISIT EST AGE 40-64: CPT | Mod: 25 | Performed by: FAMILY MEDICINE

## 2025-03-18 PROCEDURE — 99214 OFFICE O/P EST MOD 30 MIN: CPT | Mod: 25 | Performed by: FAMILY MEDICINE

## 2025-03-18 PROCEDURE — 36415 COLL VENOUS BLD VENIPUNCTURE: CPT | Performed by: FAMILY MEDICINE

## 2025-03-18 PROCEDURE — 90677 PCV20 VACCINE IM: CPT | Performed by: FAMILY MEDICINE

## 2025-03-18 PROCEDURE — 96127 BRIEF EMOTIONAL/BEHAV ASSMT: CPT | Performed by: FAMILY MEDICINE

## 2025-03-18 PROCEDURE — 80053 COMPREHEN METABOLIC PANEL: CPT | Performed by: FAMILY MEDICINE

## 2025-03-18 PROCEDURE — 1126F AMNT PAIN NOTED NONE PRSNT: CPT | Performed by: FAMILY MEDICINE

## 2025-03-18 PROCEDURE — G2211 COMPLEX E/M VISIT ADD ON: HCPCS | Performed by: FAMILY MEDICINE

## 2025-03-18 PROCEDURE — 85027 COMPLETE CBC AUTOMATED: CPT | Performed by: FAMILY MEDICINE

## 2025-03-18 PROCEDURE — 3079F DIAST BP 80-89 MM HG: CPT | Performed by: FAMILY MEDICINE

## 2025-03-18 RX ORDER — ETONOGESTREL AND ETHINYL ESTRADIOL VAGINAL RING .015; .12 MG/D; MG/D
RING VAGINAL
Qty: 3 EACH | Refills: 4 | Status: SHIPPED | OUTPATIENT
Start: 2025-03-18

## 2025-03-18 RX ORDER — RIZATRIPTAN BENZOATE 10 MG/1
10 TABLET, ORALLY DISINTEGRATING ORAL SEE ADMIN INSTRUCTIONS
Qty: 18 TABLET | Refills: 2 | Status: SHIPPED | OUTPATIENT
Start: 2025-03-18

## 2025-03-18 RX ORDER — GABAPENTIN 100 MG/1
100-300 CAPSULE ORAL AT BEDTIME
Qty: 90 CAPSULE | Refills: 5 | Status: SHIPPED | OUTPATIENT
Start: 2025-03-18

## 2025-03-18 RX ORDER — VALACYCLOVIR HYDROCHLORIDE 500 MG/1
500 TABLET, FILM COATED ORAL 2 TIMES DAILY
Qty: 6 TABLET | Refills: 4 | Status: SHIPPED | OUTPATIENT
Start: 2025-03-18

## 2025-03-18 RX ORDER — CITALOPRAM HYDROBROMIDE 20 MG/1
TABLET ORAL
Qty: 135 TABLET | Refills: 2 | Status: SHIPPED | OUTPATIENT
Start: 2025-03-18

## 2025-03-18 RX ORDER — ALBUTEROL SULFATE 90 UG/1
2 INHALANT RESPIRATORY (INHALATION) EVERY 6 HOURS PRN
Qty: 18 G | Refills: 4 | Status: SHIPPED | OUTPATIENT
Start: 2025-03-18

## 2025-03-18 RX ORDER — MOMETASONE FUROATE MONOHYDRATE 50 UG/1
2 SPRAY, METERED NASAL DAILY
Qty: 17 G | Refills: 11 | Status: SHIPPED | OUTPATIENT
Start: 2025-03-18

## 2025-03-18 ASSESSMENT — PAIN SCALES - GENERAL
PAINLEVEL_OUTOF10: NO PAIN (0)
PAINLEVEL_OUTOF10: NO PAIN (0)

## 2025-03-18 NOTE — PATIENT INSTRUCTIONS
Patient Education   Preventive Care Advice   This is general advice given by our system to help you stay healthy. However, your care team may have specific advice just for you. Please talk to your care team about your preventive care needs.  Nutrition  Eat 5 or more servings of fruits and vegetables each day.  Try wheat bread, brown rice and whole grain pasta (instead of white bread, rice, and pasta).  Get enough calcium and vitamin D. Check the label on foods and aim for 100% of the RDA (recommended daily allowance).  Lifestyle  Exercise at least 150 minutes each week  (30 minutes a day, 5 days a week).  Do muscle strengthening activities 2 days a week. These help control your weight and prevent disease.  No smoking.  Wear sunscreen to prevent skin cancer.  Have a dental exam and cleaning every 6 months.  Yearly exams  See your health care team every year to talk about:  Any changes in your health.  Any medicines your care team has prescribed.  Preventive care, family planning, and ways to prevent chronic diseases.  Shots (vaccines)   HPV shots (up to age 26), if you've never had them before.  Hepatitis B shots (up to age 59), if you've never had them before.  COVID-19 shot: Get this shot when it's due.  Flu shot: Get a flu shot every year.  Tetanus shot: Get a tetanus shot every 10 years.  Pneumococcal, hepatitis A, and RSV shots: Ask your care team if you need these based on your risk.  Shingles shot (for age 50 and up)  General health tests  Diabetes screening:  Starting at age 35, Get screened for diabetes at least every 3 years.  If you are younger than age 35, ask your care team if you should be screened for diabetes.  Cholesterol test: At age 39, start having a cholesterol test every 5 years, or more often if advised.  Bone density scan (DEXA): At age 50, ask your care team if you should have this scan for osteoporosis (brittle bones).  Hepatitis C: Get tested at least once in your life.  STIs (sexually  transmitted infections)  Before age 24: Ask your care team if you should be screened for STIs.  After age 24: Get screened for STIs if you're at risk. You are at risk for STIs (including HIV) if:  You are sexually active with more than one person.  You don't use condoms every time.  You or a partner was diagnosed with a sexually transmitted infection.  If you are at risk for HIV, ask about PrEP medicine to prevent HIV.  Get tested for HIV at least once in your life, whether you are at risk for HIV or not.  Cancer screening tests  Cervical cancer screening: If you have a cervix, begin getting regular cervical cancer screening tests starting at age 21.  Breast cancer scan (mammogram): If you've ever had breasts, begin having regular mammograms starting at age 40. This is a scan to check for breast cancer.  Colon cancer screening: It is important to start screening for colon cancer at age 45.  Have a colonoscopy test every 10 years (or more often if you're at risk) Or, ask your provider about stool tests like a FIT test every year or Cologuard test every 3 years.  To learn more about your testing options, visit:   .  For help making a decision, visit:   https://bit.ly/nr61468.  Prostate cancer screening test: If you have a prostate, ask your care team if a prostate cancer screening test (PSA) at age 55 is right for you.  Lung cancer screening: If you are a current or former smoker ages 50 to 80, ask your care team if ongoing lung cancer screenings are right for you.  For informational purposes only. Not to replace the advice of your health care provider. Copyright   2023 [facility]. All rights reserved. Clinically reviewed by the [facility]. [employer] 505985 - REV 01/24.  Learning About Stress  What is stress?     Stress is your body's response to a hard situation. Your body can have a physical, emotional, or mental response. Stress is a fact of life for most people, and it  affects everyone differently. What causes stress for you may not be stressful for someone else.  A lot of things can cause stress. You may feel stress when you go on a job interview, take a test, or run a race. This kind of short-term stress is normal and even useful. It can help you if you need to work hard or react quickly. For example, stress can help you finish an important job on time.  Long-term stress is caused by ongoing stressful situations or events. Examples of long-term stress include long-term health problems, ongoing problems at work, or conflicts in your family. Long-term stress can harm your health.  How does stress affect your health?  When you are stressed, your body responds as though you are in danger. It makes hormones that speed up your heart, make you breathe faster, and give you a burst of energy. This is called the fight-or-flight stress response. If the stress is over quickly, your body goes back to normal and no harm is done.  But if stress happens too often or lasts too long, it can have bad effects. Long-term stress can make you more likely to get sick, and it can make symptoms of some diseases worse. If you tense up when you are stressed, you may develop neck, shoulder, or low back pain. Stress is linked to high blood pressure and heart disease.  Stress also harms your emotional health. It can make you maria, tense, or depressed. Your relationships may suffer, and you may not do well at work or school.  What can you do to manage stress?  You can try these things to help manage stress:   Do something active. Exercise or activity can help reduce stress. Walking is a great way to get started. Even everyday activities such as housecleaning or yard work can help.  Try yoga or katerin chi. These techniques combine exercise and meditation. You may need some training at first to learn them.  Do something you enjoy. For example, listen to music or go to a movie. Practice your hobby or do volunteer  "work.  Meditate. This can help you relax, because you are not worrying about what happened before or what may happen in the future.  Do guided imagery. Imagine yourself in any setting that helps you feel calm. You can use online videos, books, or a teacher to guide you.  Do breathing exercises. For example:  From a standing position, bend forward from the waist with your knees slightly bent. Let your arms dangle close to the floor.  Breathe in slowly and deeply as you return to a standing position. Roll up slowly and lift your head last.  Hold your breath for just a few seconds in the standing position.  Breathe out slowly and bend forward from the waist.  Let your feelings out. Talk, laugh, cry, and express anger when you need to. Talking with supportive friends or family, a counselor, or a byron leader about your feelings is a healthy way to relieve stress. Avoid discussing your feelings with people who make you feel worse.  Write. It may help to write about things that are bothering you. This helps you find out how much stress you feel and what is causing it. When you know this, you can find better ways to cope.  What can you do to prevent stress?  You might try some of these things to help prevent stress:  Manage your time. This helps you find time to do the things you want and need to do.  Get enough sleep. Your body recovers from the stresses of the day while you are sleeping.  Get support. Your family, friends, and community can make a difference in how you experience stress.  Limit your news feed. Avoid or limit time on social media or news that may make you feel stressed.  Do something active. Exercise or activity can help reduce stress. Walking is a great way to get started.  Where can you learn more?  Go to https://www.[employer].net/patiented  Enter N032 in the search box to learn more about \"Learning About Stress.\"  Current as of: October 24, 2024  Content Version: 14.4 2024-2025 [employer]   Care instructions adapted under license by your healthcare professional. If you have questions about a medical condition or this instruction, always ask your healthcare professional. [employer] disclaims any warranty or liability for your use of this information.    Learning About Depression Screening  What is depression screening?  Depression screening is a way to see if you have depression symptoms. It may be done by a doctor or counselor. It's often part of a routine checkup. That's because your mental health is just as important as your physical health.  Depression is a mental health condition that affects how you feel, think, and act. You may:  Have less energy.  Lose interest in your daily activities.  Feel sad and grouchy for a long time.  Depression is very common. It affects people of all ages.  Many things can lead to depression. Some people become depressed after they have a stroke or find out they have a major illness like cancer or heart disease. The death of a loved one or a breakup may lead to depression. It can run in families. Most experts believe that a combination of inherited genes and stressful life events can cause it.  What happens during screening?  You may be asked to fill out a form about your depression symptoms. You and the doctor will discuss your answers. The doctor may ask you more questions to learn more about how you think, act, and feel.  What happens after screening?  If you have symptoms of depression, your doctor will talk to you about your options.  Doctors usually treat depression with medicines or counseling. Often, combining the two works best. Many people don't get help because they think that they'll get over the depression on their own. But people with depression may not get better unless they get treatment.  The cause of depression is not well understood. There may be many factors involved. But if you have depression, it's not your fault.  A serious  "symptom of depression is thinking about death or suicide. If you or someone you care about talks about this or about feeling hopeless, get help right away.  It's important to know that depression can be treated. Medicine, counseling, and self-care may help.  Where can you learn more?  Go to https://www.[employer].net/patiented  Enter T185 in the search box to learn more about \"Learning About Depression Screening.\"  Current as of: July 31, 2024  Content Version: 14.4    7255-9777 [employer].   Care instructions adapted under license by your healthcare professional. If you have questions about a medical condition or this instruction, always ask your healthcare professional. [employer] disclaims any warranty or liability for your use of this information.       "

## 2025-03-18 NOTE — PROGRESS NOTES
Preventive Care Visit  [facility]  Nelida Celestin MD, Family Medicine  Mar 18, 2025      Assessment & Plan     Routine general medical examination at a health care facility  Discussed on regular exercises, daily calcium intake, healthy eating, self breast exams monthly and routine dental checks      Menopausal syndrome (hot flashes)  Patient is currently on NuvaRing for contraception  Due to proximity worsening symptoms both day and night, will proceed with starting on medication to address this.  Will start on gabapentin 100 mg once daily at bedtime patient can titrate, every week by 100 mg for a maximum of 300 mg  If she does not feel any improvement in 4 weeks, patient will call for further recommendations.  Dosing and potential medication side effects discussed.  Patient verbalised understanding and is agreeable to the plan.    - gabapentin (NEURONTIN) 100 MG capsule; Take 1-3 capsules (100-300 mg) by mouth at bedtime.  - TSH with free T4 reflex; Future    Moderate recurrent major depression (H)      6/25/2024    11:03 PM 2/13/2025    12:34 PM 3/17/2025     7:10 PM   PHQ   PHQ-9 Total Score 12 14  14    Q9: Thoughts of better off dead/self-harm past 2 weeks Not at all Not at all Not at all       Patient-reported     Slightly worsened due to situational stressors including health concerns regarding her daughter and father-in-law.  Will continue with current dose of Celexa, start on counseling, referral placed today, recheck if no improvement in 8 to 12 weeks or sooner if needed continue with weight loss efforts, healthy eating, regular exercises.    - citalopram (CELEXA) 20 MG tablet; TAKE ONE AND ONE-HALF TABLETS BY MOUTH EVERY EVENING  - EMOTIONAL / BEHAVIORAL ASSESSMENT  - Adult Mental Health  Referral; Future    Anxiety  As above  - citalopram (CELEXA) 20 MG tablet; TAKE ONE AND ONE-HALF TABLETS BY MOUTH EVERY EVENING    Recurrent cold sores  Stable, gets 1 episode every  5 to 6 months, refills given today to use for episodic cold sores as needed  - valACYclovir (VALTREX) 500 MG tablet; Take 1 tablet (500 mg) by mouth 2 times daily.    Mild intermittent asthma without complication      6/23/2024     9:18 PM 2/13/2025    12:35 PM 3/17/2025     7:14 PM   ACT Total Scores   ACT TOTAL SCORE (Goal Greater than or Equal to 20) 25 25  25    In the past 12 months, how many times did you visit the emergency room for your asthma without being admitted to the hospital? 0 0 0   In the past 12 months, how many times were you hospitalized overnight because of your asthma? 0 0 0       Patient-reported     Stable, continue with albuterol inhaler as needed, refills given today, recheck in 1 year or sooner if needed.  - albuterol (PROAIR HFA) 108 (90 Base) MCG/ACT inhaler; Inhale 2 puffs into the lungs every 6 hours as needed for shortness of breath or wheezing. Profile Rx    Chronic allergic rhinitis  Stable, continue Nasonex nasal spray as needed  - mometasone (NASONEX) 50 MCG/ACT nasal spray; Spray 2 sprays into both nostrils daily.    Chronic migraine without aura without status migrainosus, not intractable  Was stable until she encountered situational significant stressors at home  Continue with Maxalt as needed for episodic migraine headaches, expect improvement with resolving stressors and restarting her gabapentin for prevention of migraine continue with magnesium Once daily    Encounter for surveillance of other contraceptive  Refills given today  - etonogestrel-ethinyl estradiol (NUVARING) 0.12-0.015 MG/24HR vaginal ring; INSERT 1 RING VAGINALLY AND LEAVE IN PLACE FOR 21 DAYS THEN REMOVE FOR ONE WEEK. THEN REPEAT WITH NEW RING.    Snoring  Ongoing snoring and excessive daytime drowsiness, recommended to proceed with sleep study for further evaluation.  - Adult Sleep Eval & Management  Referral; Future  Excessive daytime sleepiness  As above  - Adult Sleep Eval & Management   "Referral; Future    Advance care planning  Information given to patient today    Screening for deficiency anemia    - CBC with platelets; Future    Screening for diabetes mellitus (DM)    - Comprehensive metabolic panel (BMP + Alb, Alk Phos, ALT, AST, Total. Bili, TP); Future    Screening for hyperlipidemia    - Lipid panel reflex to direct LDL Fasting; Future    Cervical cancer screening  Patient is not due for Pap until next year    Colon cancer screening  Reviewed colonoscopy from 2022, recheck in 2027 due to history of polyps      Encounter for screening mammogram for malignant neoplasm of breast  With annual mammograms, has family history of breast cancer in mother  - MA Screen Bilateral w/Lance; Future    Class 2 severe obesity due to excess calories with serious comorbidity and body mass index (BMI) of 36.0 to 36.9 in adult (H)  BMI of 36.36  Wt Readings from Last 5 Encounters:   03/18/25 107.7 kg (237 lb 6 oz)   02/13/25 110.9 kg (244 lb 9.6 oz)   12/13/23 113.4 kg (249 lb 14.4 oz)   09/14/22 110.5 kg (243 lb 9.6 oz)   05/16/22 107.5 kg (237 lb)     Appreciated patient's efforts on weight loss, healthy eating, regular exercises    Screening for thyroid disorder    - TSH with free T4 reflex; Future            BMI  Estimated body mass index is 36.36 kg/m  as calculated from the following:    Height as of this encounter: 1.721 m (5' 7.75\").    Weight as of this encounter: 107.7 kg (237 lb 6 oz).   Weight management plan: As mentioned above    Counseling  Appropriate preventive services were addressed with this patient via screening, questionnaire, or discussion as appropriate for fall prevention, nutrition, physical activity, Tobacco-use cessation, social engagement, weight loss and cognition.  Checklist reviewing preventive services available has been given to the patient.  Reviewed patient's diet, addressing concerns and/or questions.   She is at risk for lack of exercise and has been provided with information " to increase physical activity for the benefit of her well-being.   She is at risk for psychosocial distress and has been provided with information to reduce risk.   The patient's PHQ-9 score is consistent with moderate depression. She was provided with information regarding depression.       Chart documentation done in part with Dragon Voice recognition Software. Although reviewed after completion, some word and grammatical error may remain.    See Patient Instructions    Subjective   Treasure is a 49 year old, presenting for the following:  Physical and Imm/Inj (Pneumococcal 20 )           Healthy Habits:     Getting at least 3 servings of Calcium per day:  Yes    Bi-annual eye exam:  NO    Dental care twice a year:  Yes    Sleep apnea or symptoms of sleep apnea:  Excessive snoring and Daytime drowsiness    Diet:  Regular (no restrictions)    Frequency of exercise:  1 day/week    Duration of exercise:  15-30 minutes    Taking medications regularly:  Yes    Barriers to taking medications:  None    Medication side effects:  None    Additional concerns today:: Perimenopause  question.      Depression and Anxiety   How are you doing with your depression since your last visit?  Slightly worsened  How are you doing with your anxiety since your last visit?  As above  Are you having other symptoms that might be associated with depression or anxiety?  See last PHQ-9 and NATANAEL-7  Have you had a significant life event?  Health concerns regarding her daughter and father-in-law   Do you have any concerns with your use of alcohol or other drugs?  No    Social History     Tobacco Use    Smoking status: Never     Passive exposure: Never    Smokeless tobacco: Never   Vaping Use    Vaping status: Never Used   Substance Use Topics    Alcohol use: Yes     Comment: 3-4 drinks per month    Drug use: Never         6/25/2024    11:03 PM 2/13/2025    12:34 PM 3/17/2025     7:10 PM   PHQ   PHQ-9 Total Score 12 14  14    Q9: Thoughts of better  off dead/self-harm past 2 weeks Not at all Not at all Not at all       Patient-reported         4/5/2022     8:00 PM 3/26/2023     6:49 PM 6/16/2024     7:11 PM   NATANAEL-7 SCORE   Total Score 7 (mild anxiety) 8 (mild anxiety) 10 (moderate anxiety)   Total Score 7 8 10         3/17/2025     7:10 PM   Last PHQ-9   1.  Little interest or pleasure in doing things 2   2.  Feeling down, depressed, or hopeless 2   3.  Trouble falling or staying asleep, or sleeping too much 2   4.  Feeling tired or having little energy 3   5.  Poor appetite or overeating 2   6.  Feeling bad about yourself 1   7.  Trouble concentrating 1   8.  Moving slowly or restless 1   Q9: Thoughts of better off dead/self-harm past 2 weeks 0   PHQ-9 Total Score 14        Patient-reported         6/16/2024     7:11 PM   NATANAEL-7    1. Feeling nervous, anxious, or on edge 1   2. Not being able to stop or control worrying 3   3. Worrying too much about different things 1   4. Trouble relaxing 1   5. Being so restless that it is hard to sit still 1   6. Becoming easily annoyed or irritable 1   7. Feeling afraid, as if something awful might happen 2   NATANAEL-7 Total Score 10   If you checked any problems, how difficult have they made it for you to do your work, take care of things at home, or get along with other people? Somewhat difficult           Asthma Follow-Up    Was ACT completed today?  Yes        3/17/2025     7:14 PM   ACT Total Scores   ACT TOTAL SCORE (Goal Greater than or Equal to 20) 25    In the past 12 months, how many times did you visit the emergency room for your asthma without being admitted to the hospital? 0   In the past 12 months, how many times were you hospitalized overnight because of your asthma? 0       Patient-reported        How many days per week do you miss taking your asthma controller medication?  I do not have an asthma controller medication  Please describe any recent triggers for your asthma: upper respiratory infections  Have you  had any Emergency Room Visits, Urgent Care Visits, or Hospital Admissions since your last office visit?  No  Migraine   Since your last clinic visit, how have your headaches changed?  Slightly worsened since she has encountered situational stresses at home  How often are you getting headaches or migraines?  Was getting once every 1 to 2 months, has been getting almost once a week lately  Are you able to do normal daily activities when you have a migraine?  Yes  Are you taking rescue/relief medications? (Select all that apply) Maxalt  How helpful is your rescue/relief medication?  I get total relief  Are you taking any medications to prevent migraines? (Select all that apply) no, but will be starting on gabapentin for menopausal hot flashes  In the past 4 weeks, how often have you gone to urgent care or the emergency room because of your headaches?  0  Medication Followup of Valtrex as needed for cold sores  Taking Medication as prescribed: yes  Side Effects:  None  Medication Helping Symptoms:  yes    Menopausal syndrome-patient is currently sexually active, using NuvaRing for contraception.  Has been noticing progressive worsening heart pressures both day and night affecting the quality of life both at work and at home  Denies underlying history of thyroid disorder past medical history significant for asthma, anxiety, depression, obesity, migraine headaches  Patient has been facing significant stresses at home regarding physical health of her father-in-law and mental health concerns with her daughter.  Patient also reports increasing snoring and daytime drowsiness as noted by her   She never had a sleep study before.    Advance Care Planning  Patient does not have a Health Care Directive: Discussed advance care planning with patient; however, patient declined at this time.      3/17/2025   General Health   How would you rate your overall physical health? (!) FAIR   Feel stress (tense, anxious, or unable to  sleep) Very much   (!) STRESS CONCERN      3/17/2025   Nutrition   Three or more servings of calcium each day? Yes   Diet: Regular (no restrictions)   How many servings of fruit and vegetables per day? (!) 2-3   How many sweetened beverages each day? 0-1         3/17/2025   Exercise   Days per week of moderate/strenous exercise 1 day   Average minutes spent exercising at this level 20 min   (!) EXERCISE CONCERN      3/17/2025   Social Factors   Frequency of gathering with friends or relatives Once a week   Worry food won't last until get money to buy more No   Food not last or not have enough money for food? No   Do you have housing? (Housing is defined as stable permanent housing and does not include staying ouside in a car, in a tent, in an abandoned building, in an overnight shelter, or couch-surfing.) Yes   Are you worried about losing your housing? No   Lack of transportation? No   Unable to get utilities (heat,electricity)? No         3/17/2025   Dental   Dentist two times every year? Yes         Today's PHQ-9 Score:       3/17/2025     7:10 PM   PHQ-9 SCORE   PHQ-9 Total Score MyChart 14 (Moderate depression)   PHQ-9 Total Score 14        Patient-reported         3/17/2025   Substance Use   Alcohol more than 3/day or more than 7/wk Not Applicable   Do you use any other substances recreationally? No     Social History     Tobacco Use    Smoking status: Never     Passive exposure: Never    Smokeless tobacco: Never   Vaping Use    Vaping status: Never Used   Substance Use Topics    Alcohol use: Yes     Comment: 3-4 drinks per month    Drug use: Never           7/11/2024   LAST FHS-7 RESULTS   1st degree relative breast or ovarian cancer No        Mammogram Screening - Mammogram every 1-2 years updated in Health Maintenance based on mutual decision making        3/17/2025   STI Screening   New sexual partner(s) since last STI/HIV test? No     History of abnormal Pap smear: No - age 30- 64 PAP with HPV every 5  years recommended        Latest Ref Rng & Units 2021     8:03 AM 2018     1:52 PM 2018     1:51 PM   PAP / HPV   PAP  Negative for Intraepithelial Lesion or Malignancy (NILM)      PAP (Historical)    NIL    HPV 16 DNA Negative Negative  Negative     HPV 18 DNA Negative Negative  Negative     Other HR HPV Negative Negative  Negative       ASCVD Risk   The 10-year ASCVD risk score (Mattie WINTERS, et al., 2019) is: 1.2%    Values used to calculate the score:      Age: 49 years      Sex: Female      Is Non- : No      Diabetic: No      Tobacco smoker: No      Systolic Blood Pressure: 125 mmHg      Is BP treated: No      HDL Cholesterol: 48 mg/dL      Total Cholesterol: 195 mg/dL        3/17/2025   Contraception/Family Planning   Questions about contraception or family planning No        Reviewed and updated as needed this visit by Provider         [place] [facility]            Past Medical History:   Diagnosis Date    Allergic rhinitis     Asthma, intermittent     Back pain     Depression with anxiety     Depressive disorder     Essential hypertension 10/29/2019    Hyperlipidemia LDL goal < 160     Migraine      Past Surgical History:   Procedure Laterality Date    BIOPSY      Foot    COLONOSCOPY N/A 2022    Procedure: COLONOSCOPY, FLEXIBLE, WITH LESION REMOVAL USING SNARE;  Surgeon: Miky Castillo MD;  Location: [facility] OR    COLONOSCOPY WITH CO2 INSUFFLATION N/A 2022    Procedure: COLONOSCOPY, WITH CO2 INSUFFLATION;  Surgeon: Miky Castillo MD;  Location: [facility] OR    EYE SURGERY  2017    Lasik    SURGICAL HISTORY OF -   2007        SURGICAL HISTORY OF -   2006    Rt Foot mass - Perineurioma     OB History    Para Term  AB Living   2 2 2 0 0 2   SAB IAB Ectopic Multiple Live Births   0 0 0 0 2      # Outcome Date GA Lbr Presley/2nd Weight Sex Type Anes PTL Lv   2 Term      -SEC   INDU   1 Term      -SEC   INDU      Lab work is in process  Labs reviewed in EPIC  BP Readings from Last 3 Encounters:   25 125/88   25 120/70   23 129/80    Wt Readings from Last 3 Encounters:   25 107.7 kg (237 lb 6 oz)   25 110.9 kg (244 lb 9.6 oz)   23 113.4 kg (249 lb 14.4 oz)                  Patient Active Problem List   Diagnosis    Migraine    Anxiety    Mild intermittent asthma without complication    Back pain    Moderate recurrent major depression (H)    Generalized anxiety disorder    Dyslipidemia    Onychomycosis    Therapeutic drug monitoring    Degeneration of thoracic or thoracolumbar intervertebral disc    DDD (degenerative disc disease), cervical    Migraine without aura and without status migrainosus, not intractable    Encounter for surveillance of other contraceptive    Pigmented skin lesion, midback, 3mm    Chronic allergic rhinitis    Skin lesion, left thigh    Chronic right hip pain    Benign essential hypertension    Class 2 severe obesity due to excess calories with serious comorbidity and body mass index (BMI) of 36.0 to 36.9 in adult (H)    Back muscle spasm    Recurrent cold sores    Chronic neck and back pain    S/P foot surgery, right    Irritable bowel syndrome with both constipation and diarrhea    Previous  delivery, delivered    Tubular adenoma of colon    Menopausal syndrome (hot flashes)     Past Surgical History:   Procedure Laterality Date    BIOPSY      Foot    COLONOSCOPY N/A 2022    Procedure: COLONOSCOPY, FLEXIBLE, WITH LESION REMOVAL USING SNARE;  Surgeon: Miky Castillo MD;  Location: [facility]    COLONOSCOPY WITH CO2 INSUFFLATION N/A 2022    Procedure: COLONOSCOPY, WITH CO2 INSUFFLATION;  Surgeon: Miky Castillo MD;  Location: [facility] OR    EYE SURGERY  2017    Lasik    SURGICAL HISTORY OF -   2007        SURGICAL HISTORY OF -   2006    Rt Foot mass - Perineurioma       Social History     Tobacco Use     Smoking status: Never     Passive exposure: Never    Smokeless tobacco: Never   Substance Use Topics    Alcohol use: Yes     Comment: 3-4 drinks per month     Family History   Problem Relation Age of Onset    Heart Disease Father     Prostate Cancer Father     Coronary Artery Disease Father     Diabetes Maternal Grandfather     Heart Disease Paternal Grandfather     Breast Cancer Mother     Genetic Disorder Daughter         At Birth/ [place]    Genetic Disorder Daughter          Current Outpatient Medications   Medication Sig Dispense Refill    albuterol (PROAIR HFA) 108 (90 Base) MCG/ACT inhaler Inhale 2 puffs into the lungs every 6 hours as needed for shortness of breath or wheezing. Profile Rx 18 g 4    citalopram (CELEXA) 20 MG tablet TAKE ONE AND ONE-HALF TABLETS BY MOUTH EVERY EVENING 135 tablet 2    etonogestrel-ethinyl estradiol (NUVARING) 0.12-0.015 MG/24HR vaginal ring INSERT 1 RING VAGINALLY AND LEAVE IN PLACE FOR 21 DAYS THEN REMOVE FOR ONE WEEK. THEN REPEAT WITH NEW RING. 3 each 4    gabapentin (NEURONTIN) 100 MG capsule Take 1-3 capsules (100-300 mg) by mouth at bedtime. 90 capsule 5    ibuprofen (ADVIL/MOTRIN) 200 MG tablet Take 200-400 mg by mouth every 4 hours as needed for mild pain      MAGNESIUM PO Take 1 tablet by mouth daily      mometasone (NASONEX) 50 MCG/ACT nasal spray Spray 2 sprays into both nostrils daily. 17 g 11    Multiple Vitamins-Minerals (MULTIVITAMIN ADULT PO) Take 1 tablet by mouth daily      rizatriptan (MAXALT-MLT) 10 MG ODT Take 1 tablet (10 mg) by mouth See Admin Instructions. WITH ONSET OF MIGRAINE, MAY REPEAT ONCE AFTER 2 HOURS. DO NOT EXCEED 3 TABLETS IN 24 HOURS. 18 tablet 2    valACYclovir (VALTREX) 500 MG tablet Take 1 tablet (500 mg) by mouth 2 times daily. 6 tablet 4     Allergies   Allergen Reactions    Lisinopril      Dry cough, tickling sensation in the throat    Terbinafine Itching     Recent Labs   Lab Test 12/13/23  0831 08/24/22  0957 02/24/22  0708  "10/21/21  0756 03/22/21  0719 02/05/20  1717 11/20/19  1733   * 121*  --   --  118*  --   --    HDL 48* 49*  --   --  51  --   --    TRIG 140 114  --   --  174*  --   --    ALT 16 28 34   < > 29  --   --    CR 0.79 0.91  --   --  0.88 0.79 0.69   GFRESTIMATED >90 79  --   --  80 >90 >90   GFRESTBLACK  --   --   --   --  >90 >90 >90   POTASSIUM 4.8 4.6  --   --  4.0 4.1 3.8   TSH  --   --   --   --   --   --  0.94    < > = values in this interval not displayed.          Review of Systems  CONSTITUTIONAL: NEGATIVE for fever, chills, change in weight  INTEGUMENTARY/SKIN: NEGATIVE for worrisome rashes, moles or lesions  EYES: NEGATIVE for vision changes or irritation  ENT/MOUTH: History of chronic allergies  RESP: History of asthma  BREAST: NEGATIVE for masses, tenderness or discharge  CV: NEGATIVE for chest pain, palpitations or peripheral edema  GI: NEGATIVE for nausea, abdominal pain, heartburn, or change in bowel habits  : NEGATIVE for frequency, dysuria, or hematuria  MUSCULOSKELETAL: NEGATIVE for significant arthralgias or myalgia  NEURO: History of migraine headaches  ENDOCRINE: NEGATIVE for temperature intolerance, skin/hair changes  HEME: NEGATIVE for bleeding problems  PSYCHIATRIC: History of anxiety and depression     Objective    Exam  /88 (BP Location: Right arm, Patient Position: Sitting, Cuff Size: Adult Large)   Pulse 70   Temp 98.4  F (36.9  C) (Oral)   Resp 16   Ht 1.721 m (5' 7.75\")   Wt 107.7 kg (237 lb 6 oz)   SpO2 97%   BMI 36.36 kg/m     Estimated body mass index is 36.36 kg/m  as calculated from the following:    Height as of this encounter: 1.721 m (5' 7.75\").    Weight as of this encounter: 107.7 kg (237 lb 6 oz).    Physical Exam  GENERAL: alert and no distress  EYES: Eyes grossly normal to inspection, PERRL and conjunctivae and sclerae normal  HENT: ear canals and TM's normal, nose and mouth without ulcers or lesions  NECK: no adenopathy, no asymmetry, masses, or " scars  RESP: lungs clear to auscultation - no rales, rhonchi or wheezes  BREAST: normal without masses, tenderness or nipple discharge and no palpable axillary masses or adenopathy  CV: regular rate and rhythm, normal S1 S2, no S3 or S4, no murmur, click or rub, no peripheral edema  ABDOMEN: soft, nontender, no hepatosplenomegaly, no masses and bowel sounds normal  MS: no gross musculoskeletal defects noted, no edema  SKIN: no suspicious lesions or rashes  NEURO: Normal strength and tone, mentation intact and speech normal  PSYCH: mentation appears normal, affect normal/bright        Signed Electronically by: Nelida Celestin MD

## 2025-03-18 NOTE — PROGRESS NOTES
Prior to immunization administration, verified patients identity using patient s name and date of birth. Please see Immunization Activity for additional information.     Screening Questionnaire for Adult Immunization    Are you sick today?   No   Do you have allergies to medications, food, a vaccine component or latex?   No   Have you ever had a serious reaction after receiving a vaccination?   No   Do you have a long-term health problem with heart, lung, kidney, or metabolic disease (e.g., diabetes), asthma, a blood disorder, no spleen, complement component deficiency, a cochlear implant, or a spinal fluid leak?  Are you on long-term aspirin therapy?   No   Do you have cancer, leukemia, HIV/AIDS, or any other immune system problem?   No   Do you have a parent, brother, or sister with an immune system problem?   No   In the past 3 months, have you taken medications that affect  your immune system, such as prednisone, other steroids, or anticancer drugs; drugs for the treatment of rheumatoid arthritis, Crohn s disease, or psoriasis; or have you had radiation treatments?   No   Have you had a seizure, or a brain or other nervous system problem?   No   During the past year, have you received a transfusion of blood or blood    products, or been given immune (gamma) globulin or antiviral drug?   No   For women: Are you pregnant or is there a chance you could become       pregnant during the next month?   No   Have you received any vaccinations in the past 4 weeks?   No     Immunization questionnaire answers were all negative.      Patient instructed to remain in clinic for 15 minutes afterwards, and to report any adverse reactions.     Screening performed by Dawood Berry MA on 3/18/2025 at 7:41 AM.

## 2025-03-19 LAB
ALBUMIN SERPL BCG-MCNC: 3.7 G/DL (ref 3.5–5.2)
ALP SERPL-CCNC: 79 U/L (ref 40–150)
ALT SERPL W P-5'-P-CCNC: 19 U/L (ref 0–50)
ANION GAP SERPL CALCULATED.3IONS-SCNC: 13 MMOL/L (ref 7–15)
AST SERPL W P-5'-P-CCNC: 21 U/L (ref 0–45)
BILIRUB SERPL-MCNC: 0.2 MG/DL
BUN SERPL-MCNC: 14.7 MG/DL (ref 6–20)
CALCIUM SERPL-MCNC: 9.1 MG/DL (ref 8.8–10.4)
CHLORIDE SERPL-SCNC: 107 MMOL/L (ref 98–107)
CHOLEST SERPL-MCNC: 225 MG/DL
CREAT SERPL-MCNC: 0.89 MG/DL (ref 0.51–0.95)
EGFRCR SERPLBLD CKD-EPI 2021: 80 ML/MIN/1.73M2
FASTING STATUS PATIENT QL REPORTED: YES
FASTING STATUS PATIENT QL REPORTED: YES
GLUCOSE SERPL-MCNC: 89 MG/DL (ref 70–99)
HCO3 SERPL-SCNC: 20 MMOL/L (ref 22–29)
HDLC SERPL-MCNC: 52 MG/DL
LDLC SERPL CALC-MCNC: 143 MG/DL
NONHDLC SERPL-MCNC: 173 MG/DL
POTASSIUM SERPL-SCNC: 4.5 MMOL/L (ref 3.4–5.3)
PROT SERPL-MCNC: 6.4 G/DL (ref 6.4–8.3)
SODIUM SERPL-SCNC: 140 MMOL/L (ref 135–145)
TRIGL SERPL-MCNC: 149 MG/DL
TSH SERPL DL<=0.005 MIU/L-ACNC: 1.78 UIU/ML (ref 0.3–4.2)

## 2025-03-19 NOTE — RESULT ENCOUNTER NOTE
Dear Treasure,   Your recent labs showed normal liver and kidney functions, fasting blood sugar with no concern for diabetes  And normal thyroid labs.  Your hemoglobin is normal with no concern for anemia.  Your platelets are slightly elevated but other blood counts are normal.  I would recommend to repeat the blood counts in 4 weeks to see the trend.  I put the lab orders you can call for a lab only appointment to have the nonfasting labs done.  Fasting lipids showed elevated total and LDL-bad cholesterol.  -LDL(bad) cholesterol level is elevated which can increase your heart disease risk.  A diet high in fat and simple carbohydrates, genetics and being overweight can contribute to this. ADVISE: exercising 150 minutes of aerobic exercise per week (30 minutes for 5 days per week or 50 minutes for 3 days per week are options) and eating a low saturated fat/low carbohydrate diet are helpful to improve this.   Let me know if you have any questions. Take care.  Nelida Celestin MD

## 2025-03-24 ASSESSMENT — SLEEP AND FATIGUE QUESTIONNAIRES
HOW LIKELY ARE YOU TO NOD OFF OR FALL ASLEEP WHILE SITTING INACTIVE IN A PUBLIC PLACE: SLIGHT CHANCE OF DOZING
HOW LIKELY ARE YOU TO NOD OFF OR FALL ASLEEP WHILE SITTING AND READING: HIGH CHANCE OF DOZING
HOW LIKELY ARE YOU TO NOD OFF OR FALL ASLEEP IN A CAR, WHILE STOPPED FOR A FEW MINUTES IN TRAFFIC: WOULD NEVER DOZE
HOW LIKELY ARE YOU TO NOD OFF OR FALL ASLEEP WHILE LYING DOWN TO REST IN THE AFTERNOON WHEN CIRCUMSTANCES PERMIT: HIGH CHANCE OF DOZING
HOW LIKELY ARE YOU TO NOD OFF OR FALL ASLEEP WHILE SITTING AND TALKING TO SOMEONE: WOULD NEVER DOZE
HOW LIKELY ARE YOU TO NOD OFF OR FALL ASLEEP WHEN YOU ARE A PASSENGER IN A CAR FOR AN HOUR WITHOUT A BREAK: HIGH CHANCE OF DOZING
HOW LIKELY ARE YOU TO NOD OFF OR FALL ASLEEP WHILE SITTING QUIETLY AFTER LUNCH WITHOUT ALCOHOL: SLIGHT CHANCE OF DOZING
HOW LIKELY ARE YOU TO NOD OFF OR FALL ASLEEP WHILE WATCHING TV: HIGH CHANCE OF DOZING

## 2025-03-25 ENCOUNTER — VIRTUAL VISIT (OUTPATIENT)
Dept: SLEEP MEDICINE | Facility: CLINIC | Age: 50
End: 2025-03-25
Attending: FAMILY MEDICINE
Payer: COMMERCIAL

## 2025-03-25 VITALS — BODY MASS INDEX: 35.1 KG/M2 | HEIGHT: 69 IN | WEIGHT: 237 LBS

## 2025-03-25 DIAGNOSIS — G47.19 EXCESSIVE DAYTIME SLEEPINESS: ICD-10-CM

## 2025-03-25 DIAGNOSIS — E66.812 CLASS 2 SEVERE OBESITY DUE TO EXCESS CALORIES WITH SERIOUS COMORBIDITY AND BODY MASS INDEX (BMI) OF 36.0 TO 36.9 IN ADULT (H): ICD-10-CM

## 2025-03-25 DIAGNOSIS — E66.01 CLASS 2 SEVERE OBESITY DUE TO EXCESS CALORIES WITH SERIOUS COMORBIDITY AND BODY MASS INDEX (BMI) OF 36.0 TO 36.9 IN ADULT (H): ICD-10-CM

## 2025-03-25 DIAGNOSIS — R29.818 SUSPECTED SLEEP APNEA: Primary | ICD-10-CM

## 2025-03-25 DIAGNOSIS — N95.1 MENOPAUSAL SYNDROME (HOT FLASHES): ICD-10-CM

## 2025-03-25 DIAGNOSIS — I10 PRIMARY HYPERTENSION: ICD-10-CM

## 2025-03-25 DIAGNOSIS — R06.83 SNORING: ICD-10-CM

## 2025-03-25 PROCEDURE — 1126F AMNT PAIN NOTED NONE PRSNT: CPT | Mod: 95 | Performed by: NURSE PRACTITIONER

## 2025-03-25 PROCEDURE — 98002 SYNCH AUDIO-VIDEO NEW MOD 45: CPT | Performed by: NURSE PRACTITIONER

## 2025-03-25 ASSESSMENT — PAIN SCALES - GENERAL: PAINLEVEL_OUTOF10: NO PAIN (0)

## 2025-03-25 NOTE — PATIENT INSTRUCTIONS
"          MY TREATMENT INFORMATION FOR SLEEP APNEA-  Treasure Pradhan    DOCTOR : TRINIDAD Brannon CNP    Am I having a sleep study at a sleep center?  --->Due to normal delays, you will be contacted within 2-4 weeks to schedule    Am I having a home sleep study?  --->Watch the video for the device you are using:    -/drop off device-   https://www.[employer].com/watch?v=yGGFBdELGhk    -Disposable device sent out require phone/computer application-   https://www.[employer].com/watch?v=BCce_vbiwxE      Frequently asked questions:  1. What is Obstructive Sleep Apnea (BRIDGER)? BRIDGER is the most common type of sleep apnea. Apnea means, \"without breath.\"  Apnea is most often caused by narrowing or collapse of the upper airway as muscles relax during sleep.   Almost everyone has occasional apneas. Most people with sleep apnea have had brief interruptions at night frequently for many years.  The severity of sleep apnea is related to how frequent and severe the events are.   2. What are the consequences of BRIDGER? Symptoms include: feeling sleepy during the day, snoring loudly, gasping or stopping of breathing, trouble sleeping, and occasionally morning headaches or heartburn at night.  Sleepiness can be serious and even increase the risk of falling asleep while driving. Other health consequences may include development of high blood pressure and other cardiovascular disease in persons who are susceptible. Untreated BRIDGER  can contribute to heart disease, stroke and diabetes.   3. What are the treatment options? In most situations, sleep apnea is a lifelong disease that must be managed with daily therapy. Medications are not effective for sleep apnea and surgery is generally not considered until other therapies have been tried. Your treatment is your choice . Continuous Positive Airway (CPAP) works right away and is the therapy that is effective in nearly everyone. An oral device to hold your jaw forward is usually the next " most reliable option. Other options include postioning devices (to keep you off your back), weight loss, and surgery including a tongue pacing device. There is more detail about some of these options below.  4. Are my sleep studies covered by insurance? Although we will request verification of coverage, we advise you also check in advance of the study to ensure there is coverage.    Important tips for those choosing CPAP and similar devices  REMEMBER-IF YOU RECEIVE A CALL FROM  [phone]-->IT IS TO SETUP A DEVICE  For new devices, sign up for device TERESO to monitor your device for your followup visits  We encourage you to utilize the [facility] tereso or website ( https://[employer].MediaSite/ ) to monitor your therapy progress and share the data with your healthcare team when you discuss your sleep apnea.                                                    Know your equipment:  CPAP is continuous positive airway pressure that prevents obstructive sleep apnea by keeping the throat from collapsing while you are sleeping. In most cases, the device is  smart  and can slowly self-adjusts if your throat collapses and keeps a record every day of how well you are treated-this information is available to you and your care team.  BPAP is bilevel positive airway pressure that keeps your throat open and also assists each breath with a pressure boost to maintain adequate breathing.  Special kinds of BPAP are used in patients who have inadequate breathing from lung or heart disease. In most cases, the device is  smart  and can slowly self-adjusts to assist breathing. Like CPAP, the device keeps a record of how well you are treated.  Your mask is your connection to the device. You get to choose what feels most comfortable and the staff will help to make sure if fits. Here: are some examples of the different masks that are available: Magnetic mask aids may assist with use but there are safety issues that should be addressed when  considering with magnets* ( see end of discussion).       Key points to remember on your journey with sleep apnea:  Sleep study.  PAP devices often need to be adjusted during a sleep study to show that they are effective and adjusted right.  Good tips to remember: Try wearing just the mask during a quiet time during the day so your body adapts to wearing it. A humidifier is recommended for comfort in most cases to prevent drying of your nose and throat. Allergy medication from your provider may help you if you are having nasal congestion.  Getting settled-in. It takes more than one night for most of us to get used to wearing a mask. Try wearing just the mask during a quiet time during the day so your body adapts to wearing it. A humidifier is recommended for comfort in most cases. Our team will work with you carefully on the first day and will be in contact within 4 days and again at 2 and 4 weeks for advice and remote device adjustments. Your therapy is evaluated by the device each day.   Use it every night. The more you are able to sleep naturally for 7-8 hours, the more likely you will have good sleep and to prevent health risks or symptoms from sleep apnea. Even if you use it 4 hours it helps. Occasionally all of us are unable to use a medical therapy, in sleep apnea, it is not dangerous to miss one night.   Communicate. Call our skilled team on the number provided on the first day if your visit for problems that make it difficult to wear the device. Over 2 out of 3 patients can learn to wear the device long-term with help from our team. Remember to call our team or your sleep providers if you are unable to wear the device as we may have other solutions for those who cannot adapt to mask CPAP therapy. It is recommended that you sleep your sleep provider within the first 3 months and yearly after that if you are not having problems.   Use it for your health. We encourage use of CPAP masks during daytime quiet  periods to allow your face and brain to adapt to the sensation of CPAP so that it will be a more natural sensation to awaken to at night or during naps. This can be very useful during the first few weeks or months of adapting to CPAP though it does not help medically to wear CPAP during wakefulness and  should not be used as a strategy just to meet guidelines.  Take care of your equipment. Make sure you clean your mask and tubing using directions every day and that your filter and mask are replaced as recommended or if they are not working.     *Masks with magnets:  Updated Contraindications  Masks with magnetic components are contraindicated for use by patients where they, or anyone in close physical contact while using the mask, have the following:   Active medical implants that interact with magnets (i.e., pacemakers, implantable cardioverter defibrillators (ICD), neurostimulators, cerebrospinal fluid (CSF) shunts, insulin/infusion pumps)   Metallic implants/objects containing ferromagnetic material (i.e., aneurysm clips/flow disruption devices, embolic coils, stents, valves, electrodes, implants to restore hearing or balance with implanted magnets, ocular implants, metallic splinters in the eye)  Updated Warning  Keep the mask magnets at a safe distance of at least 6 inches (150 mm) away from implants or medical devices that may be adversely affected by magnetic interference. This warning applies to you or anyone in close physical contact with your mask. The magnets are in the frame and lower headgear clips, with a magnetic field strength of up to 400mT. When worn, they connect to secure the mask but may inadvertently detach while asleep.  Implants/medical devices, including those listed within contraindications, may be adversely affected if they change function under external magnetic fields or contain ferromagnetic materials that attract/repel to magnetic fields (some metallic implants, e.g., contact lenses  with metal, dental implants, metallic cranial plates, screws, maribell hole covers, and bone substitute devices). Consult your physician and  of your implant / other medical device for information on the potential adverse effects of magnetic fields.    BESIDES CPAP, WHAT OTHER THERAPIES ARE THERE?    Positioning Device  Positioning devices are generally used when sleep apnea is mild and only occurs on your back.This example shows a pillow that straps around the waist. It may be appropriate for those whose sleep study shows milder sleep apnea that occurs primarily when lying flat on one's back. Preliminary studies have shown benefit but effectiveness at home may need to be verified by a home sleep test. These devices are generally not covered by medical insurance.  Examples of devices that maintain sleeping on the back to prevent snoring and mild sleep apnea.    Belt type body positioner  http://[employer]/    Electronic reminder  http://nightshifttherapy.Modi[employer]/            Oral Appliance  What is oral appliance therapy?  An oral appliance device fits on your teeth at night like a retainer used after having braces. The device is made by a specialized dentist and requires several visits over 1-2 months before a manufactured device is made to fit your teeth and is adjusted to prevent your sleep apnea. Once an oral device is working properly, snoring should be improved. A home sleep test may be recommended at that time if to determine whether the sleep apnea is adequately treated.       Some things to remember:  -Oral devices are often, but not always, covered by your medical insurance. Be sure to check with your insurance provider.   -If you are referred for oral therapy, you will be given a list of specialized dentists to consider or you may choose to visit the Web site of the American Academy of Dental Sleep Medicine  -Oral devices are less likely to work if you have severe sleep apnea or are extremely  overweight.     More detailed information  An oral appliance is a small acrylic device that fits over the upper and lower teeth  (similar to a retainer or a mouth guard). This device slightly moves jaw forward, which moves the base of the tongue forward, opens the airway, improves breathing for effective treat snoring and obstructive sleep apnea in perhaps 7 out of 10 people .  The best working devices are custom-made by a dental device  after a mold is made of the teeth 1, 2, 3.  When is an oral appliance indicated?  Oral appliance therapy is recommended as a first-line treatment for patients with primary snoring, mild sleep apnea, and for patients with moderate sleep apnea who prefer appliance therapy to use of CPAP4, 5. Severity of sleep apnea is determined by sleep testing and is based on the number of respiratory events per hour of sleep.   How successful is oral appliance therapy?  The success rate of oral appliance therapy in patients with mild sleep apnea is 75-80% while in patients with moderate sleep apnea it is 50-70%. The chance of success in patients with severe sleep apnea is 40-50%. The research also shows that oral appliances have a beneficial effect on the cardiovascular health of BRIDGER patients at the same magnitude as CPAP therapy7.  Oral appliances should be a second-line treatment in cases of severe sleep apnea, but if not completely successful then a combination therapy utilizing CPAP plus oral appliance therapy may be effective. Oral appliances tend to be effective in a broad range of patients although studies show that the patients who have the highest success are females, younger patients, those with milder disease, and less severe obesity. 3, 6.   Finding a dentist that practices dental sleep medicine  Specific training is available through the American Academy of Dental Sleep Medicine for dentists interested in working in the field of sleep. To find a dentist who is educated in  the field of sleep and the use of oral appliances, near you, visit the Web site of the American Academy of Dental Sleep Medicine.    References  1. Adam, et al. Objectively measured vs self-reported compliance during oral appliance therapy for sleep-disordered breathing. Chest 2013; 144(5): 6549-0263.  2. Allison et al. Objective measurement of compliance during oral appliance therapy for sleep-disordered breathing. Thorax 2013; 68(1): 91-96.  3. Jennifer et al. Mandibular advancement devices in 620 men and women with BRIDGER and snoring: tolerability and predictors of treatment success. Chest 2004; 125: 7191-4737.  4. Merrick, et al. Oral appliances for snoring and BRIDGER: a review. Sleep 2006; 29: 244-262.  5. Dianne et al. Oral appliance treatment for BRIDGER: an update. J Clin Sleep Med 2014; 10(2): 215-227.  6. Olimpia et al. Predictors of OSAH treatment outcome. J Dent Res 2007; 86: 9923-8586.      Weight Loss:   Your Body mass index is 35 kg/m .    Being overweight does not necessarily mean you will have health consequences.  Those who have BMI over 30 or over 27 with existing medical conditions carries greater risk.   Weight loss decreases severity of sleep apnea in most people with obesity. For those with mild obesity who have developed snoring with weight gain, even 15-30 pound weight loss can improve and occasionally milder eliminate sleep apnea.  Structured and life-long dietary and health habits are necessary to lose weight and keep healthier weight levels.     The Comprehensive Weight loss program offers all aspects of weight loss strategies including two Non-Surgical Weight Loss Programs: Medical Weight Management and our 24 Week Healthy Lifestyle Program:  Medical Weight Management: You will meet with a Medical Weight Management Provider, as well as a Registered Dietician. The program may include medication therapy, dietary education, recommended exercise and physical therapy programs,  monthly support group meetings, and possible psychological counseling. Follow up visits with the provider or dietician are scheduled based on your progress and needs.  24 Week Healthy Lifestyle Program: This unique program is designed to give you the support of weekly appointments and activities thru a 24-week period. It may include all of the components of the basic program (above), with the addition of 11 individual Health  Visits, 24-week access to the [employer] website for over 700 online classes, and monthly support group meetings. This program has an out-of-pocket expense of $499 to cover the items that can not be billed to insurance (health coaches and [employer] access), and is non-refundable/non-transferable (you may be able to use a Health Savings Account; ask your HSA provider). There may be an optional meal replacement plan prescribed as well.   Medication therapy has been approved for the treatment of sleep apnea: The FDA approved tirzepatide for moderate to severe sleep apnea (apnea-hypopnea index greater than or equal to 15) in patients with BMI of greater than or equal to 30, or BMI greater than or equal to 27 with at least 1 weight-related condition such as hypertension or dyslipidemia.  Surgical management achieves meaningful long-term weight loss and improvement in health risks in most patients with more severe obesity.      Sleep Apnea Surgery:    Surgery for obstructive sleep apnea is considered generally only when other therapies fail to work. Surgery may be discussed with you if you are having a difficult time tolerating CPAP and or when there is an abnormal structure that requires surgical correction.  Nose and throat surgeries often enlarge the airway to prevent collapse.  Most of these surgeries create pain for 1-2 weeks and up to half of the most common surgeries are not effective throughout life.  You should carefully discuss the benefits and drawbacks to surgery with your sleep  provider and surgeon to determine if it is the best solution for you.   More information  Surgery for BRIDGER is directed at areas that are responsible for narrowing or complete obstruction of the airway during sleep.  There are a wide range of procedures available to enlarge and/or stabilize the airway to prevent blockage of breathing in the three major areas where it can occur: the palate, tongue, and nasal regions.  Successful surgical treatment depends on the accurate identification of the factors responsible for obstructive sleep apnea in each person.  A personalized approach is required because there is no single treatment that works well for everyone.  Because of anatomic variation, consultation with an examination by a sleep surgeon is a critical first step in determining what surgical options are best for each patient.  In some cases, examination during sedation may be recommended in order to guide the selection of procedures.  Patients will be counseled about risks and benefits as well as the typical recovery course after surgery. Surgery is typically not a cure for a person s BRIDGER.  However, surgery will often significantly improve one s BRIDGER severity (termed  success rate ).  Even in the absence of a cure, surgery will decrease the cardiovascular risk associated with OSA7; improve overall quality of life8 (sleepiness, functionality, sleep quality, etc).      Palate Procedures:  Patients with BRIDGER often have narrowing of their airway in the region of their tonsils and uvula.  The goals of palate procedures are to widen the airway in this region as well as to help the tissues resist collapse.  Modern palate procedure techniques focus on tissue conservation and soft tissue rearrangement, rather than tissue removal.  Often the uvula is preserved in this procedure. Residual sleep apnea is common in patient after pharyngoplasty with an average reduction in sleep apnea events of 33%2.      Tongue  Procedures:  ExamWhile patients are awake, the muscles that surround the throat are active and keep this region open for breathing. These muscles relax during sleep, allowing the tongue and other structures to collapse and block breathing.  There are several different tongue procedures available.  Selection of a tongue base procedure depends on characteristics seen on physical exam.  Generally, procedures are aimed at removing bulky tissues in this area or preventing the back of the tongue from falling back during sleep.  Success rates for tongue surgery range from 50-62%3.    Hypoglossal Nerve Stimulation:  Hypoglossal nerve stimulation has recently received approval from the [place] Food and Drug Administration for the treatment of obstructive sleep apnea.  This is based on research showing that the system was safe and effective in treating sleep apnea6.  Results showed that the median AHI score decreased 68%, from 29.3 to 9.0. This therapy uses an implant system that senses breathing patterns and delivers mild stimulation to airway muscles, which keeps the airway open during sleep.  The system consists of three fully implanted components: a small generator (similar in size to a pacemaker), a breathing sensor, and a stimulation lead.  Using a small handheld remote, a patient turns the therapy on before bed and off upon awakening.    Candidates for this device must be greater than 18 years of age, have moderate to severe obstructive sleep apnea with less than 25% central events  (AHI between 15-65), BMI less than 35, have tried CPAP/oral appliance for at least 8 weeks without success, and have appropriate upper airway anatomy (determined by a sleep endoscopy performed by Dr. Keven Chavez or Dr. Delta Drummond).     Nasal Procedures:  Nasal obstruction can interfere with nasal breathing during the day and night.  Studies have shown that relief of nasal obstruction can improve the ability of some patients to  tolerate positive airway pressure therapy for obstructive sleep apnea1.  Treatment options include medications such as nasal saline, topical corticosteroid and antihistamine sprays, and oral medications such as antihistamines or decongestants. Non-surgical treatments can include external nasal dilators for selected patients. If these are not successful by themselves, surgery can improve the nasal airway either alone or in combination with these other options.        Combination Procedures:  Combination of surgical procedures and other treatments may be recommended, particularly if patients have more than one area of narrowing or persistent positional disease.  The success rate of combination surgery ranges from 66-80%2,3.    References  Anabella DIAZ. The Role of the Nose in Snoring and Obstructive Sleep Apnoea: An Update.  Eur Arch Otorhinolaryngol. 2011; 268: 1365-73.   Esmer SM; Jocelin JA; Gio JR; Pallanch JF; Lizy MB; Tiffany SG; Kory BARNES. Surgical modifications of the upper airway for obstructive sleep apnea in adults: a systematic review and meta-analysis. SLEEP 2010;33(10):5168-2563. Bhupendra BENDER. Hypopharyngeal surgery in obstructive sleep apnea: an evidence-based medicine review.  Arch Otolaryngol Head Neck Surg. 2006 Feb;132(2):206-13.  Armando YH1, Grace Y, Salazar FREDDY. The efficacy of anatomically based multilevel surgery for obstructive sleep apnea. Otolaryngol Head Neck Surg. 2003 Oct;129(4):327-35.  Bhupendra BENDER, Goldberg A. Hypopharyngeal Surgery in Obstructive Sleep Apnea: An Evidence-Based Medicine Review. Arch Otolaryngol Head Neck Surg. 2006 Feb;132(2):206-13.  Esteban ESPINO et al. Upper-Airway Stimulation for Obstructive Sleep Apnea.  N Engl J Med. 2014 Jan 9;370(2):139-49.  Erasmo Y et al. Increased Incidence of Cardiovascular Disease in Middle-aged Men with Obstructive Sleep Apnea. Am J Respir Crit Care Med; 2002 166: 159-165  Flaquita LOVELL et al. Studying Life Effects and Effectiveness of  Palatopharyngoplasty (SLEEP) study: Subjective Outcomes of Isolated Uvulopalatopharyngoplasty. Otolaryngol Head Neck Surg. 2011; 144: 623-631.        WHAT IF I ONLY HAVE SNORING?    Mandibular advancement devices, lateral sleep positioning, long-term weight loss and treatment of nasal allergies have been shown to improve snoring.  Exercising tongue muscles with a game (https://apps.[employer]/us/tereso/ShoutOmatic-reduce-snoring/pz7049980978) or stimulating the tongue during the day with a device (https://doi.org/10.3390/hpa83937730) have improved snoring in some individuals.  https://www.Work4ce.me.Jiberish/  https://www.sleepfoundation.org/best-anti-snoring-mouthpieces-and-mouthguards    Remember to Drive Safe... Drive Alive     Sleep health profoundly affects your health, mood, and your safety.  Thirty three percent of the population (one in three of us) is not getting enough sleep and many have a sleep disorder. Not getting enough sleep or having an untreated / undertreated sleep condition may make us sleepy without even knowing it. In fact, our driving could be dramatically impaired due to our sleep health. As your provider, here are some things I would like you to know about driving:     Here are some warning signs for impairment and dangerous drowsy driving:              -Having been awake more than 16 hours               -Looking tired               -Eyelid drooping              -Head nodding (it could be too late at this point)              -Driving for more than 30 minutes     Some things you could do to make the driving safer if you are experiencing some drowsiness:              -Stop driving and rest              -Call for transportation              -Make sure your sleep disorder is adequately treated     Some things that have been shown NOT to work when experiencing drowsiness while driving:              -Turning on the radio              -Opening windows              -Eating any  distracting  /  entertaining   foods (e.g., sunflower seeds, candy, or any other)              -Talking on the phone      Your decision may not only impact your life, but also the life of others. Please, remember to drive safe for yourself and all of us.

## 2025-03-25 NOTE — PROGRESS NOTES
Outpatient Sleep Medicine Consultation:      Name: Treasure Pradhan MRN# [id number]   Age: 49 year old YOB: 1975     Date of Consultation: March 25, 2025  Consultation is requested by: Nelida Celestin MD  [address] [place] [address] Nelida Celestin  Primary care provider: Nelida Celestin       Reason for Sleep Consult:     Treasure Pradhan is sent by Nelida Celestin for a sleep consultation regarding snoring, excessive daytime sleepiness.    Patient s Reason for visit  Treasure Pradhan main reason for visit: (Patient-Rptd) Not feeling rested after sleeping. Daytime sleepiness.  Patient states problem(s) started: (Patient-Rptd) 2017  Treasure Pradhan's goals for this visit: (Patient-Rptd) Rule out any significant problems that increase helath risks           Assessment and Plan:     Impression/Plan:    ICD-10-CM    1. Suspected sleep apnea  R29.818 HST - Home Sleep Apnea Test - Noxturnal, T-3 Returnable      2. Snoring  R06.83 HST - Home Sleep Apnea Test - Noxturnal, T-3 Returnable     Adult Sleep Eval & Management  Referral      3. Excessive daytime sleepiness  G47.19 HST - Home Sleep Apnea Test - Noxturnal, T-3 Returnable     Adult Sleep Eval & Management  Referral      4. Primary hypertension  I10 HST - Home Sleep Apnea Test - Noxturnal, T-3 Returnable      5. Class 2 severe obesity due to excess calories with serious comorbidity and body mass index (BMI) of 36.0 to 36.9 in adult (H)  E66.812 HST - Home Sleep Apnea Test - Noxturnal, T-3 Returnable    E66.01     Z68.36       6. Menopausal syndrome (hot flashes)  N95.1 HST - Home Sleep Apnea Test - Noxturnal, T-3 Returnable        Plans for Treasure Pradhan include:  A home sleep test.  Patient has a STOP-BANG score of 5/8 with positives for snoring, fatigue, blood pressure, BMI, and neck circumference she has comorbidities of snoring class II obesity and menopausal syndrome.  This places her at high  pretest probability for obstructive sleep apnea.  I have asked that she follow-up with a [employer] message when she has completed her sleep study so we may communicate as the results become available, and update her plan of care accordingly.  Recommend patient optimize her sleep schedule as well as her sleep hygiene practices to mitigate any further sleep disruption.  Recommend patient employ safe driving practices such as not driving a motor vehicle should she become drowsy.  Recommend weight management to BMI of 30.0 or less.  Follow-up in the sleep medicine clinic 10-12 weeks after completion of your sleep study.    58 minutes spent with patient, all of which were spent face-to-face counseling, consulting, coordinating plan of care.  The longitudinal plan of care for the diagnosis(es)/condition(s) as documented were addressed during this visit. Due to the added complexity in care, I will continue to support Treasure in the subsequent management and with ongoing continuity of care.    TRINIDAD Brannon CNP         History of Present Illness:     Treasure Pradhan presents to the sleep medicine clinic with concerns of nonrestorative sleep, daytime sleepiness.    Treasure Pradhan has a past medical history relevant for hypertension, dyslipidemia, asthma, recurrent major depression, anxiety, class II severe obesity with serious comorbidities, irritable bowel syndrome, perimenopausal, chronic neck and back pain, chronic allergic rhinitis, migraines, cervical degenerative disc disease.    Currently taking Claritin and hydroxyzine for possible symptoms  Consider dosage of gabapentin be increased to 300 mg nightly.    Sets aside an adequate amount of time for sleep each night.  Nonwork days schedule similar to workdays.    May use Benadryl for sleep initiation    Hyperlipidemia    Anxiety, Depression   Citalopram 30 mg daily    Nocturnal leg cramping   Magnesium, 1 tablet daily.  Finds that this is effective in treating  her cramping    BP Readings from Last 6 Encounters:   03/18/25 125/88   02/13/25 120/70   12/13/23 129/80   09/14/22 122/83   05/19/22 (P) 116/75   02/24/22 122/80        Wt Readings from Last 10 Encounters:   03/25/25 107.5 kg (237 lb)   03/18/25 107.7 kg (237 lb 6 oz)   02/13/25 110.9 kg (244 lb 9.6 oz)   12/13/23 113.4 kg (249 lb 14.4 oz)   09/14/22 110.5 kg (243 lb 9.6 oz)   05/16/22 107.5 kg (237 lb)   02/24/22 107 kg (236 lb)   09/29/21 107.8 kg (237 lb 9 oz)   08/25/21 111.1 kg (245 lb)   03/22/21 107.5 kg (237 lb 1.6 oz)        Tonsils: In    Neck Circumference: 2 XL    [place] Sleepiness Scale  Total score - [place]:14   (Less than 10 normal)     Insomnia Severity Scale  TRUDY Total Score: 18  (normal 0-7, mild 8-14, moderate 15-21, severe 22-28)    STOP-BANG score 5/8 which places her at high pretest probability for obstructive sleep apnea.  Discussed pathophysiology of central sleep apnea, obstructive sleep apnea.  Also discussed implications of untreated sleep apnea.  Reviewed potential treatments for sleep apnea, both surgical and nonsurgical with emphasis on PAP therapy.  Sleep testing process reviewed    Social History:    Pharmacist    Past Sleep Evaluations:  None    SLEEP-WAKE SCHEDULE:     Work/School Days: Patient goes to school/work: (Patient-Rptd) Yes   Usually gets into bed at (Patient-Rptd) 9pm  Takes patient about (Patient-Rptd) Usually 20 mins. Sometimes can be up to 1 hour. to fall asleep most days it is less than an hour. Winding down.tosses and turns.   Has trouble falling asleep (Patient-Rptd) 1 nights per week  Wakes up in the middle of the night (Patient-Rptd) Multiple. Anywhere from 1 time to 6 times.  Wakes up due to (Patient-Rptd) External stimuli (bed partner, pets, noise, etc);Use the bathroom;AnxietyHusband snores,  Dog, Elimination  She has trouble falling back asleep (Patient-Rptd) 2 times a week.   It usually takes (Patient-Rptd) 1 to 2 hours to get back to sleep Usually  not 2 hours  Patient is usually up at (Patient-Rptd) 7  Uses alarm: (Patient-Rptd) Yes    Weekends/Non-work Days/All Other Days:  Usually gets into bed at (Patient-Rptd) 10   Takes patient about (Patient-Rptd) 20 minutes to fall asleep  Patient is usually up at (Patient-Rptd) 10  Uses alarm: (Patient-Rptd) Yes    Sleep Need  Patient gets  (Patient-Rptd) 8 per night average sleep on average Choppy. Tries to get 7-8.Sometimes will leave and go to couch if  is too noisy  Patient thinks she needs about (Patient-Rptd) 9 sleep    Treasure Pradhan prefers to sleep in this position(s): (Patient-Rptd) Side   Patient states they do the following activities in bed: (Patient-Rptd) Read;Use phone, computer, or tablet    Naps  Patient takes a purposeful nap (Patient-Rptd) 0 times a week and naps are usually (Patient-Rptd) 30 min in duration  She feels better after a nap: (Patient-Rptd) No  She dozes off unintentionally (Patient-Rptd) 1 days per week  Patient has had a driving accident or near-miss due to sleepiness/drowsiness: (Patient-Rptd) No      SLEEP DISRUPTIONS:    Breathing/Snoring  Patient snores:(Patient-Rptd) Yes  Other people complain about her snoring: (Patient-Rptd) Yes  Patient has been told she stops breathing in her sleep:(Patient-Rptd) No  She has issues with the following: (Patient-Rptd) Morning headaches;Morning mouth dryness;Stuffy nose when you wake up;Getting up to urinate more than once.    Movement:  Patient gets pain, discomfort, with an urge to move:  (Patient-Rptd) Yes restless legs symptoms  It happens when she is resting:  (Patient-Rptd) Yes  It happens more at night:  (Patient-Rptd) Yes  Patient has been told she kicks her legs at night:  (Patient-Rptd) Yes     Behaviors in Sleep:  Treasure Pradhan has experienced the following behaviors while sleeping: (Patient-Rptd) Sleepwalking;Teeth grinding sleepwalking as a child  She has experienced sudden muscle weakness during the day: (Patient-Rptd)  No  Pt denies + bruxism, does wear a mouth guard, on occasion sleep talking, sleep walking only as a child, and no dream enactment behavior. Pt denies no sleep paralysis, no hypnagogue and no cataplexy.       Is there anything else you would like your sleep provider to know:        CAFFEINE AND OTHER SUBSTANCES:    Patient consumes caffeinated beverages per day:  (Patient-Rptd) 4  Last caffeine use is usually: (Patient-Rptd) 3pm  List of any prescribed or over the counter stimulants that patient takes:    List of any prescribed or over the counter sleep medication patient takes: (Patient-Rptd) Not taken for sleep, but gabapentin and hydroxyzine.Gabapentin  List of previous sleep medications that patient has tried: (Patient-Rptd) Just benedryl  Patient drinks alcohol to help them sleep: (Patient-Rptd) No  Patient drinks alcohol near bedtime: (Patient-Rptd) No    Family History:  Patient has a family member been diagnosed with a sleep disorder: (Patient-Rptd) No            SCALES:    EPWORTH SLEEPINESS SCALE         3/24/2025     4:21 PM    Fraser Sleepiness Scale ( ALIE Rachel  [id number]<br>ESS - [place]/English - Final version - 21 Nov 07 - [facility] [employer].)   Sitting and reading High chance of dozing   Watching TV High chance of dozing   Sitting, inactive in a public place (e.g. a theatre or a meeting) Slight chance of dozing   As a passenger in a car for an hour without a break High chance of dozing   Lying down to rest in the afternoon when circumstances permit High chance of dozing   Sitting and talking to someone Would never doze   Sitting quietly after a lunch without alcohol Slight chance of dozing   In a car, while stopped for a few minutes in traffic Would never doze   Fraser Score (MC) 14   Fraser Score (Sleep) 14        Patient-reported         INSOMNIA SEVERITY INDEX (TRUDY)          3/24/2025     3:53 PM   Insomnia Severity Index (TRUDY)   Difficulty falling asleep 1   Difficulty staying asleep 3    Problems waking up too early 1   How SATISFIED/DISSATISFIED are you with your CURRENT sleep pattern? 4   How NOTICEABLE to others do you think your sleep problem is in terms of impairing the quality of your life? 3   How WORRIED/DISTRESSED are you about your current sleep problem? 3   To what extent do you consider your sleep problem to INTERFERE with your daily functioning (e.g. daytime fatigue, mood, ability to function at work/daily chores, concentration, memory, mood, etc.) CURRENTLY? 3   TRUDY Total Score 18        Patient-reported       Guidelines for Scoring/Interpretation:  Total score categories:  0-7 = No clinically significant insomnia   8-14 = Subthreshold insomnia   15-21 = Clinical insomnia (moderate severity)  22-28 = Clinical insomnia (severe)  Used via courtesy of www.Dodonationth.va.gov with permission from Josué Hayes PhD., [facility]      STOP BANG           3/24/2025     4:21 PM   STOP BANG Questionnaire (  2008, the American Society of Anesthesiologists, Inc. [employer]   1. Snoring - Do you snore loudly (louder than talking or loud enough to be heard through closed doors)? No   2. Tired - Do you often feel tired, fatigued, or sleepy during daytime? Yes   3. Observed - Has anyone observed you stop breathing during your sleep? No   4. Blood pressure - Do you have or are you being treated for high blood pressure? Yes   5. BMI - BMI more than 35 kg/m2? Yes   6. Age - Age over 50 yr old? No   7. Neck circumference - Neck circumference greater than 40 cm? No   8. Gender - Gender male? No   STOP BANG Score (MC): 3 (High risk of BRIDGER)         GAD7        6/16/2024     7:11 PM   NATANAEL-7    1. Feeling nervous, anxious, or on edge 1   2. Not being able to stop or control worrying 3   3. Worrying too much about different things 1   4. Trouble relaxing 1   5. Being so restless that it is hard to sit still 1   6. Becoming easily annoyed or irritable 1   7. Feeling afraid, as if  "something awful might happen 2   NATANAEL-7 Total Score 10   If you checked any problems, how difficult have they made it for you to do your work, take care of things at home, or get along with other people? Somewhat difficult         CAGE-AID         No data to display                CAGE-AID reprinted with permission from the [place] [employer], DEANDRE Velasquez. and SHERLYN Brand, \"Conjoint screening questionnaires for alcohol and drug abuse\" [place] Medical Journal 94: 135-140, 1995.      PATIENT HEALTH QUESTIONNAIRE-9 (PHQ - 9)        3/17/2025     7:10 PM   PHQ-9 ([employer])   1.  Little interest or pleasure in doing things 2   2.  Feeling down, depressed, or hopeless 2   3.  Trouble falling or staying asleep, or sleeping too much 2   4.  Feeling tired or having little energy 3   5.  Poor appetite or overeating 2   6.  Feeling bad about yourself - or that you are a failure or have let yourself or your family down 1   7.  Trouble concentrating on things, such as reading the newspaper or watching television 1   8.  Moving or speaking so slowly that other people could have noticed. Or the opposite - being so fidgety or restless that you have been moving around a lot more than usual 1   9.  Thoughts that you would be better off dead, or of hurting yourself in some way 0   PHQ-9 Total Score 14    6.  Feeling bad about yourself 1   7.  Trouble concentrating 1   8.  Moving slowly or restless 1   9.  Suicidal or self-harm thoughts 0   1.  Little interest or pleasure in doing things More than half the days   2.  Feeling down, depressed, or hopeless More than half the days   3.  Trouble falling or staying asleep, or sleeping too much More than half the days   4.  Feeling tired or having little energy Nearly every day   5.  Poor appetite or overeating More than half the days   6.  Feeling bad about yourself Several days   7.  Trouble concentrating Several days   8.  Moving slowly or restless Several days   9.  Suicidal or " self-harm thoughts Not at all   PHQ-9 via MyChart TOTAL SCORE-----> 14 (Moderate depression)   Difficulty at work, home, or with people Somewhat difficult       Patient-reported       Developed by Roberta Garza, Eugenia Licona, Eitan Sun and colleagues, with an educational mauricio from [employer] No permission required to reproduce, translate, display or distribute.        Allergies:    Allergies   Allergen Reactions    Lisinopril      Dry cough, tickling sensation in the throat    Terbinafine Itching       Medications:    Current Outpatient Medications   Medication Sig Dispense Refill    albuterol (PROAIR HFA) 108 (90 Base) MCG/ACT inhaler Inhale 2 puffs into the lungs every 6 hours as needed for shortness of breath or wheezing. Profile Rx 18 g 4    citalopram (CELEXA) 20 MG tablet TAKE ONE AND ONE-HALF TABLETS BY MOUTH EVERY EVENING 135 tablet 2    etonogestrel-ethinyl estradiol (NUVARING) 0.12-0.015 MG/24HR vaginal ring INSERT 1 RING VAGINALLY AND LEAVE IN PLACE FOR 21 DAYS THEN REMOVE FOR ONE WEEK. THEN REPEAT WITH NEW RING. 3 each 4    gabapentin (NEURONTIN) 100 MG capsule Take 1-3 capsules (100-300 mg) by mouth at bedtime. 90 capsule 5    ibuprofen (ADVIL/MOTRIN) 200 MG tablet Take 200-400 mg by mouth every 4 hours as needed for mild pain      MAGNESIUM PO Take 1 tablet by mouth daily      mometasone (NASONEX) 50 MCG/ACT nasal spray Spray 2 sprays into both nostrils daily. 17 g 11    Multiple Vitamins-Minerals (MULTIVITAMIN ADULT PO) Take 1 tablet by mouth daily      rizatriptan (MAXALT-MLT) 10 MG ODT Take 1 tablet (10 mg) by mouth See Admin Instructions. WITH ONSET OF MIGRAINE, MAY REPEAT ONCE AFTER 2 HOURS. DO NOT EXCEED 3 TABLETS IN 24 HOURS. 18 tablet 2    valACYclovir (VALTREX) 500 MG tablet Take 1 tablet (500 mg) by mouth 2 times daily. 6 tablet 4       Problem List:  Patient Active Problem List    Diagnosis Date Noted    Menopausal syndrome (hot flashes) 03/18/2025     Priority: Medium     Tubular adenoma of colon 2023     Priority: Medium    Recurrent cold sores 2021     Priority: Medium    Chronic neck and back pain 2021     Priority: Medium    S/P foot surgery, right 2021     Priority: Medium    Irritable bowel syndrome with both constipation and diarrhea 2021     Priority: Medium    Back muscle spasm 2020     Priority: Medium    Benign essential hypertension 10/29/2019     Priority: Medium    Class 2 severe obesity due to excess calories with serious comorbidity and body mass index (BMI) of 36.0 to 36.9 in adult (H) 10/29/2019     Priority: Medium    Chronic right hip pain 2019     Priority: Medium    Chronic allergic rhinitis 2018     Priority: Medium    Skin lesion, left thigh 2018     Priority: Medium    Pigmented skin lesion, midback, 3mm 07/15/2016     Priority: Medium    Encounter for surveillance of other contraceptive 2016     Priority: Medium    Migraine without aura and without status migrainosus, not intractable 2015     Priority: Medium    Degeneration of thoracic or thoracolumbar intervertebral disc 2015     Priority: Medium    DDD (degenerative disc disease), cervical 2015     Priority: Medium    Dyslipidemia 2014     Priority: Medium     Diagnosis updated by automated process. Provider to review and confirm.      Onychomycosis 2014     Priority: Medium    Therapeutic drug monitoring 2014     Priority: Medium    Moderate recurrent major depression (H) 2014     Priority: Medium    Generalized anxiety disorder 2014     Priority: Medium     Diagnosis updated by automated process. Provider to review and confirm.      Back pain      Priority: Medium    Anxiety 2010     Priority: Medium    Mild intermittent asthma without complication      Priority: Medium    Migraine      Priority: Medium    Previous  delivery, delivered 2007     Priority: Medium        Past  Medical/Surgical History:  Past Medical History:   Diagnosis Date    Allergic rhinitis     Asthma, intermittent     Back pain     Depression with anxiety     Depressive disorder     Essential hypertension 10/29/2019    Hyperlipidemia LDL goal < 160     Migraine      Past Surgical History:   Procedure Laterality Date    BIOPSY  2005    Foot    COLONOSCOPY N/A 2022    Procedure: COLONOSCOPY, FLEXIBLE, WITH LESION REMOVAL USING SNARE;  Surgeon: Miky Castillo MD;  Location: [facility] OR    COLONOSCOPY WITH CO2 INSUFFLATION N/A 2022    Procedure: COLONOSCOPY, WITH CO2 INSUFFLATION;  Surgeon: Miky Castillo MD;  Location: [facility] OR    EYE SURGERY  2017    Lasik    SURGICAL HISTORY OF -   2007        SURGICAL HISTORY OF -   2006    Rt Foot mass - Perineurioma       Social History:  Social History     Socioeconomic History    Marital status:      Spouse name: Not on file    Number of children: Not on file    Years of education: Not on file    Highest education level: Not on file   Occupational History    Not on file   Tobacco Use    Smoking status: Never     Passive exposure: Never    Smokeless tobacco: Never   Vaping Use    Vaping status: Never Used   Substance and Sexual Activity    Alcohol use: Yes     Comment: 3-4 drinks per month    Drug use: Never    Sexual activity: Yes     Partners: Male     Birth control/protection: Inserts/Ring   Other Topics Concern    Parent/sibling w/ CABG, MI or angioplasty before 65F 55M? No   Social History Narrative    Not on file     Social Drivers of Health     Financial Resource Strain: Low Risk  (3/17/2025)    Financial Resource Strain     Within the past 12 months, have you or your family members you live with been unable to get utilities (heat, electricity) when it was really needed?: No   Food Insecurity: Low Risk  (3/17/2025)    Food Insecurity     Within the past 12 months, did you worry that your food would run out before you got  money to buy more?: No     Within the past 12 months, did the food you bought just not last and you didn t have money to get more?: No   Transportation Needs: Low Risk  (3/17/2025)    Transportation Needs     Within the past 12 months, has lack of transportation kept you from medical appointments, getting your medicines, non-medical meetings or appointments, work, or from getting things that you need?: No   Physical Activity: Insufficiently Active (3/17/2025)    Exercise Vital Sign     Days of Exercise per Week: 1 day     Minutes of Exercise per Session: 20 min   Stress: Stress Concern Present (3/17/2025)    [place] [employer] - Occupational Stress Questionnaire     Feeling of Stress : Very much   Social Connections: Unknown (3/17/2025)    Social Connection and Isolation Panel [NHANES]     Frequency of Communication with Friends and Family: Not on file     Frequency of Social Gatherings with Friends and Family: Once a week     Attends Hindu Services: Not on file     Active Member of Clubs or Organizations: Not on file     Attends Club or Organization Meetings: Not on file     Marital Status: Not on file   Interpersonal Safety: Low Risk  (3/18/2025)    Interpersonal Safety     Do you feel physically and emotionally safe where you currently live?: Yes     Within the past 12 months, have you been hit, slapped, kicked or otherwise physically hurt by someone?: No     Within the past 12 months, have you been humiliated or emotionally abused in other ways by your partner or ex-partner?: No   Housing Stability: Low Risk  (3/17/2025)    Housing Stability     Do you have housing? : Yes     Are you worried about losing your housing?: No       Family History:  Family History   Problem Relation Age of Onset    Heart Disease Father     Prostate Cancer Father     Coronary Artery Disease Father     Diabetes Maternal Grandfather     Heart Disease Paternal Grandfather     Breast Cancer Mother     Genetic  "Disorder Daughter         At Birth/ [place]    Genetic Disorder Daughter        Review of Systems:  A complete review of systems reviewed by me is negative with the exeption of what has been mentioned in the history of present illness.  In the last TWO WEEKS have you experienced any of the following symptoms?  Fevers: (Patient-Rptd) No  Night Sweats: (Patient-Rptd) Yes  Weight Gain: (Patient-Rptd) No  Pain at Night: (Patient-Rptd) Yes  Double Vision: (Patient-Rptd) No  Changes in Vision: (Patient-Rptd) Yes  Difficulty Breathing through Nose: (Patient-Rptd) Yes  Sore Throat in Morning: (Patient-Rptd) Yes  Dry Mouth in the Morning: (Patient-Rptd) Yes  Shortness of Breath Lying Flat: (Patient-Rptd) No  Shortness of Breath With Activity: (Patient-Rptd) No  Awakening with Shortness of Breath: (Patient-Rptd) No  Increased Cough: (Patient-Rptd) No  Heart Racing at Night: (Patient-Rptd) No  Swelling in Feet or Legs: (Patient-Rptd) Yes  Diarrhea at Night: (Patient-Rptd) No  Heartburn at Night: (Patient-Rptd) No  Urinating More than Once at Night: (Patient-Rptd) Yes  Losing Control of Urine at Night: (Patient-Rptd) No  Joint Pains at Night: (Patient-Rptd) Yes  Headaches in Morning: (Patient-Rptd) Yes  Weakness in Arms or Legs: (Patient-Rptd) No  Depressed Mood: (Patient-Rptd) Yes  Anxiety: (Patient-Rptd) Yes     Physical Examination:  Vitals: Ht 1.753 m (5' 9\")   Wt 107.5 kg (237 lb)   BMI 35.00 kg/m    BMI= Body mass index is 35 kg/m .         Physical Exam  Vitals and nursing note reviewed.   Constitutional:       General: She is awake. She is not in acute distress.     Appearance: Normal appearance. She is well-developed and well-groomed. She is obese. She is not ill-appearing, toxic-appearing or diaphoretic.      Comments: Insightful, and engaging.   HENT:      Head: Normocephalic and atraumatic.      Right Ear: External ear normal.      Left Ear: External ear normal.      Nose: Nose normal.      Mouth/Throat:      Mouth: " "Mucous membranes are moist.      Pharynx: Oropharynx is clear.   Eyes:      Conjunctiva/sclera: Conjunctivae normal.   Pulmonary:      Effort: Pulmonary effort is normal.   Musculoskeletal:      Cervical back: Normal range of motion and neck supple.   Neurological:      General: No focal deficit present.      Mental Status: She is alert and oriented to person, place, and time.   Psychiatric:         Mood and Affect: Mood normal.         Behavior: Behavior normal. Behavior is cooperative.         Thought Content: Thought content normal.         Judgment: Judgment normal.          Physical examination is limited by the nature of a virtual visit      All Labs Personally Reviewed               Data: All pertinent previous laboratory data reviewed     Recent Labs   Lab Test 03/18/25  0755 12/13/23  0831    139   POTASSIUM 4.5 4.8   CHLORIDE 107 107   CO2 20* 23   ANIONGAP 13 9   GLC 89 96   BUN 14.7 20.8*   CR 0.89 0.79   JOEL 9.1 9.2       Recent Labs   Lab Test 03/18/25  0755   WBC 6.9   RBC 4.47   HGB 12.3   HCT 39.1   MCV 88   MCH 27.5   MCHC 31.5   RDW 16.4*   *       Recent Labs   Lab Test 03/18/25  0755   PROTTOTAL 6.4   ALBUMIN 3.7   BILITOTAL 0.2   ALKPHOS 79   AST 21   ALT 19       TSH   Date Value   03/18/2025 1.78 uIU/mL   11/20/2019 0.94 mU/L   04/24/2012 0.64 mU/L       No results found for: \"UAMP\", \"UBARB\", \"BENZODIAZEUR\", \"UCANN\", \"UCOC\", \"OPIT\", \"UPCP\"    Iron Saturation Index   Date/Time Value Ref Range Status   12/09/2009 04:54 PM 7 (L) 15 - 46 % Final       No results found for: \"PH\", \"PHARTERIAL\", \"PO2\", \"RH8KFHHBYPQ\", \"SAT\", \"PCO2\", \"HCO3\", \"BASEEXCESS\", \"MANFRED\", \"BEB\"    @LABRCNTIPR(phv:4,pco2v:4,po2v:4,hco3v:4,bettina:4,o2per:4)@    Echocardiology: No results found for this or any previous visit (from the past 4320 hours).        Chest x-ray: No results found for this or any previous visit from the past 365 days.      Chest CT: No results found for this or any previous visit from the past " 365 days.      PFT: Most Recent [facility] Pulmonary Function Testing        Mimi Salomon, TRINIDAD CNP 3/25/2025   Sleep Medicine

## 2025-03-25 NOTE — PROGRESS NOTES
Virtual Visit Details    Type of service:  Video Visit 7:30 AM-8:10 AM    Originating Location (pt. Location): Other work vehicle    Distant Location (provider location):  Off-site  Platform used for Video Visit: Ruddy

## 2025-03-25 NOTE — NURSING NOTE
Current patient location: [address] [place] [address]    Is the patient currently in the state of [place]? YES    Visit mode: VIDEO    If the visit is dropped, the patient can be reconnected by:VIDEO VISIT: Text to cell phone:   Telephone Information:   Mobile [phone]       Will anyone else be joining the visit? NO  (If patient encounters technical issues they should call [phone]    Are changes needed to the allergy or medication list? No    Are refills needed on medications prescribed by this physician? NO    Rooming Documentation:  Questionnaire(s) completed    Reason for visit: Consult    Arron NIEVES

## 2025-04-21 ENCOUNTER — LAB (OUTPATIENT)
Dept: LAB | Facility: CLINIC | Age: 50
End: 2025-04-21
Payer: COMMERCIAL

## 2025-04-21 DIAGNOSIS — D75.839 THROMBOCYTOSIS: Primary | ICD-10-CM

## 2025-04-21 DIAGNOSIS — D69.6 LOW PLATELET COUNT: ICD-10-CM

## 2025-04-21 LAB
BASOPHILS # BLD AUTO: 0.1 10E3/UL (ref 0–0.2)
BASOPHILS NFR BLD AUTO: 1 %
EOSINOPHIL # BLD AUTO: 0.5 10E3/UL (ref 0–0.7)
EOSINOPHIL NFR BLD AUTO: 6 %
ERYTHROCYTE [DISTWIDTH] IN BLOOD BY AUTOMATED COUNT: 17.3 % (ref 10–15)
HCT VFR BLD AUTO: 37.3 % (ref 35–47)
HGB BLD-MCNC: 12 G/DL (ref 11.7–15.7)
IMM GRANULOCYTES # BLD: 0 10E3/UL
IMM GRANULOCYTES NFR BLD: 1 %
LYMPHOCYTES # BLD AUTO: 2.2 10E3/UL (ref 0.8–5.3)
LYMPHOCYTES NFR BLD AUTO: 27 %
MCH RBC QN AUTO: 28.6 PG (ref 26.5–33)
MCHC RBC AUTO-ENTMCNC: 32.2 G/DL (ref 31.5–36.5)
MCV RBC AUTO: 89 FL (ref 78–100)
MONOCYTES # BLD AUTO: 1 10E3/UL (ref 0–1.3)
MONOCYTES NFR BLD AUTO: 13 %
NEUTROPHILS # BLD AUTO: 4.2 10E3/UL (ref 1.6–8.3)
NEUTROPHILS NFR BLD AUTO: 52 %
NRBC # BLD AUTO: 0 10E3/UL
NRBC BLD AUTO-RTO: 0 /100
PLATELET # BLD AUTO: 488 10E3/UL (ref 150–450)
RBC # BLD AUTO: 4.19 10E6/UL (ref 3.8–5.2)
WBC # BLD AUTO: 8 10E3/UL (ref 4–11)

## 2025-04-21 PROCEDURE — 85025 COMPLETE CBC W/AUTO DIFF WBC: CPT

## 2025-04-21 PROCEDURE — 36415 COLL VENOUS BLD VENIPUNCTURE: CPT

## 2025-04-22 ENCOUNTER — PATIENT OUTREACH (OUTPATIENT)
Dept: ONCOLOGY | Facility: CLINIC | Age: 50
End: 2025-04-22
Payer: COMMERCIAL

## 2025-04-22 NOTE — PROGRESS NOTES
New Patient Oncology Nurse Navigator Note     Referral Received: 04/22/25      Referring provider:     Nelida Celestin MD     Referring Clinic/Organization: [facility]     Referred to: Benign Hematology    Requested provider (if applicable): First available - did not specify      Evaluation for :   Diagnosis   D75.839 (ICD-10-CM) - Thrombocytosis      Clinical History (per Nurse review of records provided):       Latest Reference Range & Units 12/13/23 08:31 03/18/25 07:55 04/21/25 10:27   WBC 4.0 - 11.0 10e3/uL 6.5 6.9 8.0   Hemoglobin 11.7 - 15.7 g/dL 12.2 12.3 12.0   Hematocrit 35.0 - 47.0 % 39.1 39.1 37.3   Platelet Count 150 - 450 10e3/uL 405 461 (H) 488 (H)   RBC Count 3.80 - 5.20 10e6/uL 4.32 4.47 4.19   MCV 78 - 100 fL 91 88 89   MCH 26.5 - 33.0 pg 28.2 27.5 28.6   MCHC 31.5 - 36.5 g/dL 31.2 (L) 31.5 32.2   RDW 10.0 - 15.0 % 15.1 (H) 16.4 (H) 17.3 (H)   (H): Data is abnormally high  (L): Data is abnormally low    Records Location: [facility]     Additional testing needed prior to consult:     ?    Referral updates and Plan:     04/22/2025 11:52 AM -  Referral received and reviewed. Pt aware of referral via result note.  Sent to NPS to schedule next available.    Linda Lozano, ESVINN, RN  Hematology/Oncology Nurse Navigator  [facility]  [phone] / [phone]

## 2025-04-22 NOTE — RESULT ENCOUNTER NOTE
Dear Treasure,  Your recent labs showed elevated platelets again though other blood counts and hemoglobin are in normal range.  I have placed a consult to hematologist to look into this further.  [facility] will call you to coordinate your care as prescribed by the provider.  If you don't hear from a representative within 2 business days, please call [phone]   Let me know if you have any questions. Take care.  Nelida Celestin MD

## 2025-04-29 ENCOUNTER — ALLIED HEALTH/NURSE VISIT (OUTPATIENT)
Dept: FAMILY MEDICINE | Facility: CLINIC | Age: 50
End: 2025-04-29
Payer: COMMERCIAL

## 2025-04-29 VITALS — SYSTOLIC BLOOD PRESSURE: 148 MMHG | DIASTOLIC BLOOD PRESSURE: 88 MMHG

## 2025-04-29 DIAGNOSIS — I10 BENIGN ESSENTIAL HYPERTENSION: Primary | ICD-10-CM

## 2025-04-29 PROCEDURE — 3079F DIAST BP 80-89 MM HG: CPT | Performed by: FAMILY MEDICINE

## 2025-04-29 PROCEDURE — 3077F SYST BP >= 140 MM HG: CPT | Performed by: FAMILY MEDICINE

## 2025-04-29 PROCEDURE — 99207 PR NO CHARGE NURSE ONLY: CPT | Performed by: FAMILY MEDICINE

## 2025-04-29 NOTE — PROGRESS NOTES
Treasure Pradhan was evaluated at [facility] on April 29, 2025 at which time her blood pressure was:    BP Readings from Last 1 Encounters:   04/29/25 (!) 148/88     No data recorded      Reviewed lifestyle modifications for blood pressure control and reduction: including making healthy food choices, managing weight, getting regular exercise, smoking cessation, reducing alcohol consumption, monitoring blood pressure regularly.     Symptoms: None    BP Goal:< 140/90 mmHg    BP Assessment:  BP too high    Potential Reasons for BP too high: Recent exertion    BP Follow-Up Plan: Recheck BP in 30 days in the pharmacy.Date of next scheduled BP visit or call:      Recommendation to Provider: none at this time, recheck in one month    Note completed by: Belén Lara RPH

## 2025-06-02 ENCOUNTER — ALLIED HEALTH/NURSE VISIT (OUTPATIENT)
Dept: FAMILY MEDICINE | Facility: CLINIC | Age: 50
End: 2025-06-02
Payer: COMMERCIAL

## 2025-06-02 VITALS — DIASTOLIC BLOOD PRESSURE: 82 MMHG | HEART RATE: 74 BPM | SYSTOLIC BLOOD PRESSURE: 124 MMHG

## 2025-06-02 DIAGNOSIS — Z01.30 BP CHECK: Primary | ICD-10-CM

## 2025-06-02 PROCEDURE — 99207 PR NO CHARGE NURSE ONLY: CPT | Performed by: FAMILY MEDICINE

## 2025-06-02 PROCEDURE — 3074F SYST BP LT 130 MM HG: CPT | Performed by: FAMILY MEDICINE

## 2025-06-02 PROCEDURE — 3079F DIAST BP 80-89 MM HG: CPT | Performed by: FAMILY MEDICINE

## 2025-06-02 NOTE — PROGRESS NOTES
Treasure Pradhan was evaluated at [facility] on June 2, 2025 at which time her blood pressure was:    BP Readings from Last 1 Encounters:   06/02/25 124/82     No data recorded      Reviewed lifestyle modifications for blood pressure control and reduction: including making healthy food choices, managing weight, getting regular exercise, smoking cessation, reducing alcohol consumption, monitoring blood pressure regularly.     Symptoms: None    BP Goal:< 140/90 mmHg    BP Assessment:  BP at goal    Potential Reasons for BP too high: NA - Not applicable    BP Follow-Up Plan: Recheck BP in 6 months at pharmacy    Recommendation to Provider: none    Note completed by: Db Meza RP

## 2025-06-11 ENCOUNTER — PATIENT OUTREACH (OUTPATIENT)
Dept: CARE COORDINATION | Facility: CLINIC | Age: 50
End: 2025-06-11
Payer: COMMERCIAL

## 2025-06-30 ENCOUNTER — ONCOLOGY VISIT (OUTPATIENT)
Dept: ONCOLOGY | Facility: HOSPITAL | Age: 50
End: 2025-06-30
Attending: FAMILY MEDICINE
Payer: COMMERCIAL

## 2025-06-30 ENCOUNTER — LAB (OUTPATIENT)
Dept: INFUSION THERAPY | Facility: HOSPITAL | Age: 50
End: 2025-06-30
Attending: FAMILY MEDICINE
Payer: COMMERCIAL

## 2025-06-30 VITALS
BODY MASS INDEX: 37.44 KG/M2 | DIASTOLIC BLOOD PRESSURE: 78 MMHG | TEMPERATURE: 98.8 F | HEART RATE: 66 BPM | OXYGEN SATURATION: 97 % | HEIGHT: 68 IN | SYSTOLIC BLOOD PRESSURE: 136 MMHG | RESPIRATION RATE: 16 BRPM | WEIGHT: 247 LBS

## 2025-06-30 DIAGNOSIS — D75.839 THROMBOCYTOSIS: ICD-10-CM

## 2025-06-30 LAB
ERYTHROCYTE [DISTWIDTH] IN BLOOD BY AUTOMATED COUNT: 15.4 % (ref 10–15)
FERRITIN SERPL-MCNC: 6 NG/ML (ref 6–175)
HCT VFR BLD AUTO: 38.9 % (ref 35–47)
HGB BLD-MCNC: 11.9 G/DL (ref 11.7–15.7)
MCH RBC QN AUTO: 27.9 PG (ref 26.5–33)
MCHC RBC AUTO-ENTMCNC: 30.6 G/DL (ref 31.5–36.5)
MCV RBC AUTO: 91 FL (ref 78–100)
PLATELET # BLD AUTO: 409 10E3/UL (ref 150–450)
RBC # BLD AUTO: 4.26 10E6/UL (ref 3.8–5.2)
WBC # BLD AUTO: 7.2 10E3/UL (ref 4–11)

## 2025-06-30 PROCEDURE — G2211 COMPLEX E/M VISIT ADD ON: HCPCS | Performed by: INTERNAL MEDICINE

## 2025-06-30 PROCEDURE — G0452 MOLECULAR PATHOLOGY INTERPR: HCPCS | Mod: 26 | Performed by: PATHOLOGY

## 2025-06-30 PROCEDURE — 85014 HEMATOCRIT: CPT

## 2025-06-30 PROCEDURE — 82728 ASSAY OF FERRITIN: CPT

## 2025-06-30 PROCEDURE — 36415 COLL VENOUS BLD VENIPUNCTURE: CPT

## 2025-06-30 PROCEDURE — 99213 OFFICE O/P EST LOW 20 MIN: CPT | Performed by: INTERNAL MEDICINE

## 2025-06-30 PROCEDURE — 99204 OFFICE O/P NEW MOD 45 MIN: CPT | Performed by: INTERNAL MEDICINE

## 2025-06-30 PROCEDURE — 81338 MPL GENE COMMON VARIANTS: CPT | Performed by: INTERNAL MEDICINE

## 2025-06-30 ASSESSMENT — PAIN SCALES - GENERAL: PAINLEVEL_OUTOF10: NO PAIN (0)

## 2025-06-30 NOTE — LETTER
"6/30/2025      Treasure Pradhan  [address]  [facility] [address]      Dear Colleague,    Thank you for referring your patient, Treasure Pradhan, to the [facility]. Please see a copy of my visit note below.    Oncology Rooming Note    June 30, 2025 11:30 AM   Treasure Pradhan is a 49 year old female who presents for:    Chief Complaint   Patient presents with     Oncology Clinic Visit     New Pt Consult     Initial Vitals: There were no vitals taken for this visit. Estimated body mass index is 35 kg/m  as calculated from the following:    Height as of 3/25/25: 1.753 m (5' 9\").    Weight as of 3/25/25: 107.5 kg (237 lb). There is no height or weight on file to calculate BSA.  Data Unavailable Comment: Data Unavailable   No LMP recorded. (Menstrual status: Birth Control).  Allergies reviewed: Yes  Medications reviewed: Yes    Medications: Medication refills not needed today.  Pharmacy name entered into [facility]:    [id number] - [place], [place] - [address] [facility] [place], [place] - [address] [facility] [place], [place] [address]  [facility] [place], [place] [address]  [facility] [place] - [address] [facility] [place], [place] - [address] [facility]     Frailty Screening:   Is the patient here for a new oncology consult visit in cancer care? 1. Yes. Over the past month, have you experienced difficulty or required a caregiver to assist with:   1. Balance, walking or general mobility (including any falls)? NO  2. Completion of self-care tasks such as bathing, dressing, toileting, grooming/hygiene?  NO  3. Concentration or memory that affects your daily life?  NO, but has some brain fog    PHQ9:  Did this patient require a PHQ9?: No      Clinical concerns: none       Nela Rocha, [facility] " Hematology and Oncology Consult Note    Patient: Treasure Pradhan  MRN: [id number]  Date of Service: Jun 30, 2025           Reason for consultation      Problem List Items Addressed This Visit          Blood    Thrombocytosis    Relevant Orders    CBC with platelets (Completed)    JAK2 Reflex to CALR MPL Myeloproliferative Neoplasms NGS Panel    Ferritin         Assessment / number of problems addressed      A very pleasant 49-year-old woman with mild thrombocytosis.  Slight decrease in her hemoglobin although hemoglobin is still normal.  Combined it with slightly decrease in her mean corpuscular volume may suggest some low iron status.  Good general health otherwise.    Plan and medical decision making      Reviewed notes from her primary care physician.  Reviewed labs including CBC CMP etc.  Ordered labs.  Reviewed her ultrasound that was done in 2024.  Total time spent was over 45 minutes.    1.  Repeat CBC.  The hemoglobin is normal but the platelet count is still over 400 we will check JAK2 mutation.  Will also reflex to CALR testing if JAK2 is negative.  2.  Will also check ferritin to make sure she is not iron deficient.  3.  Follow-up with me depending upon the labs.  4.The longitudinal plan of care for the diagnosis(es)/condition(s) as documented were addressed during this visit. Due to the added complexity in care, I will continue to support Treasure in the subsequent management and with ongoing continuity of care.       Clinical/pathological stage      Cancer Staging   No matching staging information was found for the patient.      History of present illness      Ms. Treasure Pradhan is a 49 year old who has been referred to me for evaluation of moderately severe thrombocytosis.  This presented on a routine blood testing done at her primary care visit on 18 March 2025.  At that time the platelet count was over 460,000.  This was repeated again about a month later in April 2025 and it was even more elevated  at 488.  Normal white count normal hemoglobin although the hemoglobin was slightly lower than before.  Mean corpuscular volume more or less the same.  In the past review of her labs have indicated her platelet count have mostly been normal.    The patient has not had any trauma bleeding etc.  Feels normal.    Detailed review of systems      A review of systems was obtained.  Positive findings noted in the history.  Rest of the review of system is otherwise negative.      Past medical/surgical/social/family history        Past Medical History:   Diagnosis Date     Allergic rhinitis      Asthma, intermittent      Back pain      Depression with anxiety      Depressive disorder      Essential hypertension 10/29/2019     Hyperlipidemia LDL goal < 160      Migraine      Past Surgical History:   Procedure Laterality Date     BIOPSY      Foot     COLONOSCOPY N/A 2022    Procedure: COLONOSCOPY, FLEXIBLE, WITH LESION REMOVAL USING SNARE;  Surgeon: Miky Castillo MD;  Location: [facility] OR     COLONOSCOPY WITH CO2 INSUFFLATION N/A 2022    Procedure: COLONOSCOPY, WITH CO2 INSUFFLATION;  Surgeon: Miky Castillo MD;  Location: [facility] OR     EYE SURGERY  2017    Lasik     SURGICAL HISTORY OF -   2007         SURGICAL HISTORY OF -   2006    Rt Foot mass - Perineurioma     Family History   Problem Relation Age of Onset     Heart Disease Father      Prostate Cancer Father      Coronary Artery Disease Father      Diabetes Maternal Grandfather      Heart Disease Paternal Grandfather      Breast Cancer Mother      Genetic Disorder Daughter         At Birth/ [place]     Genetic Disorder Daughter      Social History     Socioeconomic History     Marital status:      Spouse name: None     Number of children: None     Years of education: None     Highest education level: None   Tobacco Use     Smoking status: Never     Passive exposure: Never     Smokeless tobacco: Never   Vaping Use      Vaping status: Never Used   Substance and Sexual Activity     Alcohol use: Yes     Comment: 3-4 drinks per month     Drug use: Never     Sexual activity: Yes     Partners: Male     Birth control/protection: Inserts/Ring   Other Topics Concern     Parent/sibling w/ CABG, MI or angioplasty before 65F 55M? No     Social Drivers of Health     Financial Resource Strain: Low Risk  (3/17/2025)    Financial Resource Strain      Within the past 12 months, have you or your family members you live with been unable to get utilities (heat, electricity) when it was really needed?: No   Food Insecurity: Low Risk  (3/17/2025)    Food Insecurity      Within the past 12 months, did you worry that your food would run out before you got money to buy more?: No      Within the past 12 months, did the food you bought just not last and you didn t have money to get more?: No   Transportation Needs: Low Risk  (3/17/2025)    Transportation Needs      Within the past 12 months, has lack of transportation kept you from medical appointments, getting your medicines, non-medical meetings or appointments, work, or from getting things that you need?: No   Physical Activity: Insufficiently Active (3/17/2025)    Exercise Vital Sign      Days of Exercise per Week: 1 day      Minutes of Exercise per Session: 20 min   Stress: Stress Concern Present (3/17/2025)    [employer] - Occupational Stress Questionnaire      Feeling of Stress : Very much   Social Connections: Unknown (3/17/2025)    Social Connection and Isolation Panel [NHANES]      Frequency of Social Gatherings with Friends and Family: Once a week   Interpersonal Safety: Low Risk  (3/18/2025)    Interpersonal Safety      Do you feel physically and emotionally safe where you currently live?: Yes      Within the past 12 months, have you been hit, slapped, kicked or otherwise physically hurt by someone?: No      Within the past 12 months, have you been humiliated or  "emotionally abused in other ways by your partner or ex-partner?: No   Housing Stability: Low Risk  (3/17/2025)    Housing Stability      Do you have housing? : Yes      Are you worried about losing your housing?: No           Allergies      Allergies   Allergen Reactions     Lisinopril      Dry cough, tickling sensation in the throat     Terbinafine Itching         Physical exam        /78 (BP Location: Left arm, Patient Position: Sitting, Cuff Size: Adult Large)   Pulse 66   Temp 98.8  F (37.1  C) (Oral)   Resp 16   Ht 1.727 m (5' 8\")   Wt 112 kg (247 lb)   SpO2 97%   BMI 37.56 kg/m        GENERAL: Alert and oriented to time place and person. Seated comfortably. In no distress.    HEAD: Atraumatic and normocephalic.    EYES: RAJ, EOMI. No pallor. No icterus.    Oral cavity: no mucosal lesion or tonsillar enlargement.    NECK: supple. JVP normal.No thyroid enlargement.    LYMPH NODES: No palpable, cervical, axillary or inguinal lymphadenopathy.    CHEST: clear to auscultation bilaterally. Symmetrical breath movements bilaterally.    CVS: S1 and S2 are Regular rate and rhythm. No murmur or gallop or rub heard. No peripheral edema.    ABDOMEN: Soft. Not tender. Not distended. No palpable hepatomegaly or splenomegaly. No other mass palpable. Bowel sounds heard.    EXTREMITIES: Warm.  Minimal arthritic changes.    NEUROLOGICAL: Alert awake oriented.  Otherwise intact.  Cranial nerves appears to be preserved.    SKIN: no rash, or bruising or purpura.      Laboratory data      Recent Results (from the past week)   CBC with platelets   Result Value Ref Range    WBC Count 7.2 4.0 - 11.0 10e3/uL    RBC Count 4.26 3.80 - 5.20 10e6/uL    Hemoglobin 11.9 11.7 - 15.7 g/dL    Hematocrit 38.9 35.0 - 47.0 %    MCV 91 78 - 100 fL    MCH 27.9 26.5 - 33.0 pg    MCHC 30.6 (L) 31.5 - 36.5 g/dL    RDW 15.4 (H) 10.0 - 15.0 %    Platelet Count 409 150 - 450 10e3/uL       Imaging results        No results found.        This " note has been dictated using voice recognition software. Any grammatical or context distortions are unintentional and inherent to the software      Signed by: Keron Pride MD      Again, thank you for allowing me to participate in the care of your patient.        Sincerely,        Keron Pride MD    Electronically signed

## 2025-06-30 NOTE — PROGRESS NOTES
"Oncology Rooming Note    June 30, 2025 11:30 AM   Treasure Pradhan is a 49 year old female who presents for:    Chief Complaint   Patient presents with    Oncology Clinic Visit     New Pt Consult     Initial Vitals: There were no vitals taken for this visit. Estimated body mass index is 35 kg/m  as calculated from the following:    Height as of 3/25/25: 1.753 m (5' 9\").    Weight as of 3/25/25: 107.5 kg (237 lb). There is no height or weight on file to calculate BSA.  Data Unavailable Comment: Data Unavailable   No LMP recorded. (Menstrual status: Birth Control).  Allergies reviewed: Yes  Medications reviewed: Yes    Medications: Medication refills not needed today.  Pharmacy name entered into [employer]:    [id number] - [place], [place] - [address] [facility] [place], [place] - [address] [facility] [place], [place] [address]  [place] [facility] [place] [address]  [place] [facility] [place] - [address] [facility] [place], [place] - [address] [facility]     Frailty Screening:   Is the patient here for a new oncology consult visit in cancer care? 1. Yes. Over the past month, have you experienced difficulty or required a caregiver to assist with:   1. Balance, walking or general mobility (including any falls)? NO  2. Completion of self-care tasks such as bathing, dressing, toileting, grooming/hygiene?  NO  3. Concentration or memory that affects your daily life?  NO, but has some brain fog    PHQ9:  Did this patient require a PHQ9?: No      Clinical concerns: none       Nela Rocha CMA            "

## 2025-06-30 NOTE — PROGRESS NOTES
[facility] Hematology and Oncology Consult Note    Patient: Treasure Pradhan  MRN: [id number]  Date of Service: Jun 30, 2025           Reason for consultation      Problem List Items Addressed This Visit          Blood    Thrombocytosis    Relevant Orders    CBC with platelets (Completed)    JAK2 Reflex to CALR MPL Myeloproliferative Neoplasms NGS Panel    Ferritin         Assessment / number of problems addressed      A very pleasant 49-year-old woman with mild thrombocytosis.  Slight decrease in her hemoglobin although hemoglobin is still normal.  Combined it with slightly decrease in her mean corpuscular volume may suggest some low iron status.  Good general health otherwise.    Plan and medical decision making      Reviewed notes from her primary care physician.  Reviewed labs including CBC CMP etc.  Ordered labs.  Reviewed her ultrasound that was done in 2024.  Total time spent was over 45 minutes.    1.  Repeat CBC.  The hemoglobin is normal but the platelet count is still over 400 we will check JAK2 mutation.  Will also reflex to CALR testing if JAK2 is negative.  2.  Will also check ferritin to make sure she is not iron deficient.  3.  Follow-up with me depending upon the labs.  4.The longitudinal plan of care for the diagnosis(es)/condition(s) as documented were addressed during this visit. Due to the added complexity in care, I will continue to support Treasure in the subsequent management and with ongoing continuity of care.       Clinical/pathological stage      Cancer Staging   No matching staging information was found for the patient.      History of present illness      Ms. Treasure Pradhan is a 49 year old who has been referred to me for evaluation of moderately severe thrombocytosis.  This presented on a routine blood testing done at her primary care visit on 18 March 2025.  At that time the platelet count was over 460,000.  This was repeated again about a month later in April 2025 and it was  even more elevated at 488.  Normal white count normal hemoglobin although the hemoglobin was slightly lower than before.  Mean corpuscular volume more or less the same.  In the past review of her labs have indicated her platelet count have mostly been normal.    The patient has not had any trauma bleeding etc.  Feels normal.    Detailed review of systems      A review of systems was obtained.  Positive findings noted in the history.  Rest of the review of system is otherwise negative.      Past medical/surgical/social/family history        Past Medical History:   Diagnosis Date    Allergic rhinitis     Asthma, intermittent     Back pain     Depression with anxiety     Depressive disorder     Essential hypertension 10/29/2019    Hyperlipidemia LDL goal < 160     Migraine      Past Surgical History:   Procedure Laterality Date    BIOPSY  2005    Foot    COLONOSCOPY N/A 2022    Procedure: COLONOSCOPY, FLEXIBLE, WITH LESION REMOVAL USING SNARE;  Surgeon: Miky Castillo MD;  Location: [facility] OR    COLONOSCOPY WITH CO2 INSUFFLATION N/A 2022    Procedure: COLONOSCOPY, WITH CO2 INSUFFLATION;  Surgeon: Miky Castillo MD;  Location: [facility] OR    EYE SURGERY  2017    Lasik    SURGICAL HISTORY OF -   2007        SURGICAL HISTORY OF -   2006    Rt Foot mass - Perineurioma     Family History   Problem Relation Age of Onset    Heart Disease Father     Prostate Cancer Father     Coronary Artery Disease Father     Diabetes Maternal Grandfather     Heart Disease Paternal Grandfather     Breast Cancer Mother     Genetic Disorder Daughter         At Birth/ [place]    Genetic Disorder Daughter      Social History     Socioeconomic History    Marital status:      Spouse name: None    Number of children: None    Years of education: None    Highest education level: None   Tobacco Use    Smoking status: Never     Passive exposure: Never    Smokeless tobacco: Never   Vaping Use    Vaping  status: Never Used   Substance and Sexual Activity    Alcohol use: Yes     Comment: 3-4 drinks per month    Drug use: Never    Sexual activity: Yes     Partners: Male     Birth control/protection: Inserts/Ring   Other Topics Concern    Parent/sibling w/ CABG, MI or angioplasty before 65F 55M? No     Social Drivers of Health     Financial Resource Strain: Low Risk  (3/17/2025)    Financial Resource Strain     Within the past 12 months, have you or your family members you live with been unable to get utilities (heat, electricity) when it was really needed?: No   Food Insecurity: Low Risk  (3/17/2025)    Food Insecurity     Within the past 12 months, did you worry that your food would run out before you got money to buy more?: No     Within the past 12 months, did the food you bought just not last and you didn t have money to get more?: No   Transportation Needs: Low Risk  (3/17/2025)    Transportation Needs     Within the past 12 months, has lack of transportation kept you from medical appointments, getting your medicines, non-medical meetings or appointments, work, or from getting things that you need?: No   Physical Activity: Insufficiently Active (3/17/2025)    Exercise Vital Sign     Days of Exercise per Week: 1 day     Minutes of Exercise per Session: 20 min   Stress: Stress Concern Present (3/17/2025)    [employer] - Occupational Stress Questionnaire     Feeling of Stress : Very much   Social Connections: Unknown (3/17/2025)    Social Connection and Isolation Panel [NHANES]     Frequency of Social Gatherings with Friends and Family: Once a week   Interpersonal Safety: Low Risk  (3/18/2025)    Interpersonal Safety     Do you feel physically and emotionally safe where you currently live?: Yes     Within the past 12 months, have you been hit, slapped, kicked or otherwise physically hurt by someone?: No     Within the past 12 months, have you been humiliated or emotionally abused in  "other ways by your partner or ex-partner?: No   Housing Stability: Low Risk  (3/17/2025)    Housing Stability     Do you have housing? : Yes     Are you worried about losing your housing?: No           Allergies      Allergies   Allergen Reactions    Lisinopril      Dry cough, tickling sensation in the throat    Terbinafine Itching         Physical exam        /78 (BP Location: Left arm, Patient Position: Sitting, Cuff Size: Adult Large)   Pulse 66   Temp 98.8  F (37.1  C) (Oral)   Resp 16   Ht 1.727 m (5' 8\")   Wt 112 kg (247 lb)   SpO2 97%   BMI 37.56 kg/m        GENERAL: Alert and oriented to time place and person. Seated comfortably. In no distress.    HEAD: Atraumatic and normocephalic.    EYES: RAJ, EOMI. No pallor. No icterus.    Oral cavity: no mucosal lesion or tonsillar enlargement.    NECK: supple. JVP normal.No thyroid enlargement.    LYMPH NODES: No palpable, cervical, axillary or inguinal lymphadenopathy.    CHEST: clear to auscultation bilaterally. Symmetrical breath movements bilaterally.    CVS: S1 and S2 are Regular rate and rhythm. No murmur or gallop or rub heard. No peripheral edema.    ABDOMEN: Soft. Not tender. Not distended. No palpable hepatomegaly or splenomegaly. No other mass palpable. Bowel sounds heard.    EXTREMITIES: Warm.  Minimal arthritic changes.    NEUROLOGICAL: Alert awake oriented.  Otherwise intact.  Cranial nerves appears to be preserved.    SKIN: no rash, or bruising or purpura.      Laboratory data      Recent Results (from the past week)   CBC with platelets   Result Value Ref Range    WBC Count 7.2 4.0 - 11.0 10e3/uL    RBC Count 4.26 3.80 - 5.20 10e6/uL    Hemoglobin 11.9 11.7 - 15.7 g/dL    Hematocrit 38.9 35.0 - 47.0 %    MCV 91 78 - 100 fL    MCH 27.9 26.5 - 33.0 pg    MCHC 30.6 (L) 31.5 - 36.5 g/dL    RDW 15.4 (H) 10.0 - 15.0 %    Platelet Count 409 150 - 450 10e3/uL       Imaging results        No results found.        This note has been dictated " using voice recognition software. Any grammatical or context distortions are unintentional and inherent to the software      Signed by: Keron Pride MD

## 2025-07-01 ENCOUNTER — PATIENT OUTREACH (OUTPATIENT)
Dept: ONCOLOGY | Facility: HOSPITAL | Age: 50
End: 2025-07-01
Payer: COMMERCIAL

## 2025-07-01 NOTE — PROGRESS NOTES
[facility]: Cancer Care                                                                                          Patient was seen in clinic yesterday for consultation regarding thrombocytosis.  Labs were drawn at visit.  Patient is to return to clinic in 3 weeks to review results.    Dr. Pride indicates that patient can return as a virtual visit.  I called her this morning to make her aware of this.  I was unable to get a hold of her directly.  I did leave her a detailed voice message for her to call us back.      Signature:  Lexi Rubio  RN Care Coordinator  [facility]

## 2025-07-10 LAB
INTERPRETATION SERPL IEP-IMP: NORMAL
LAB TEST RESULTS REPORTED IN RPTPERIOD: NORMAL
SEQUENCING METHOD PNL BLD/T: NORMAL
SPECIMEN TYPE: NORMAL

## 2025-07-22 ENCOUNTER — PATIENT OUTREACH (OUTPATIENT)
Dept: ONCOLOGY | Facility: HOSPITAL | Age: 50
End: 2025-07-22
Payer: COMMERCIAL

## 2025-07-22 NOTE — PROGRESS NOTES
[facility]: Cancer Care                                                                                            Situation: Patient chart reviewed by care coordinator.    Background: Patient with a diagnosis of mild thrombocytosis.    Assessment: Patient was seen in hematology consultation by Dr Pride on 6/30/2025.  He stated that she had a slight decrease in her hemoglobin although the hemoglobin was still in the normal range.  That combined with a slightly decreased MCV may show that she has some low iron levels.  Labs were ordered and drawn that same day.    Plan/Recommendations: Patient was to be seen a couple weeks after these labs were drawn but patient never made the appointment.  All results have been reviewed by Dr Pride who states that an appointment is no longer needed.  All of her results are in the normal range and did not detect anything abnormal.  Patient no longer needs to follow-up in our clinic.    When I called the patient to go over the above information, it went directly to [employer]  A brief message was left on her cell phone asking her to return the call.  I also sent a more detailed [employer] message with the above information and asked her to either give us a call back or respond to the [employer] message to be certain that she understands and has no questions.    Signature:  Deborah Lozoya RN

## 2025-07-23 ENCOUNTER — ANCILLARY PROCEDURE (OUTPATIENT)
Dept: MAMMOGRAPHY | Facility: CLINIC | Age: 50
End: 2025-07-23
Attending: FAMILY MEDICINE
Payer: COMMERCIAL

## 2025-07-23 DIAGNOSIS — Z12.31 ENCOUNTER FOR SCREENING MAMMOGRAM FOR MALIGNANT NEOPLASM OF BREAST: ICD-10-CM

## 2025-07-23 PROCEDURE — 77067 SCR MAMMO BI INCL CAD: CPT | Performed by: RADIOLOGY

## 2025-07-23 PROCEDURE — 77063 BREAST TOMOSYNTHESIS BI: CPT | Performed by: RADIOLOGY

## 2025-08-10 ASSESSMENT — PATIENT HEALTH QUESTIONNAIRE - PHQ9
SUM OF ALL RESPONSES TO PHQ QUESTIONS 1-9: 11
SUM OF ALL RESPONSES TO PHQ QUESTIONS 1-9: 11
10. IF YOU CHECKED OFF ANY PROBLEMS, HOW DIFFICULT HAVE THESE PROBLEMS MADE IT FOR YOU TO DO YOUR WORK, TAKE CARE OF THINGS AT HOME, OR GET ALONG WITH OTHER PEOPLE: VERY DIFFICULT

## 2025-08-11 ENCOUNTER — OFFICE VISIT (OUTPATIENT)
Dept: FAMILY MEDICINE | Facility: CLINIC | Age: 50
End: 2025-08-11
Payer: COMMERCIAL

## 2025-08-11 VITALS
DIASTOLIC BLOOD PRESSURE: 84 MMHG | WEIGHT: 250 LBS | OXYGEN SATURATION: 98 % | BODY MASS INDEX: 35.79 KG/M2 | TEMPERATURE: 97.5 F | HEIGHT: 70 IN | HEART RATE: 78 BPM | RESPIRATION RATE: 20 BRPM | SYSTOLIC BLOOD PRESSURE: 134 MMHG

## 2025-08-11 DIAGNOSIS — F33.1 MODERATE RECURRENT MAJOR DEPRESSION (H): ICD-10-CM

## 2025-08-11 DIAGNOSIS — F41.9 ANXIETY: ICD-10-CM

## 2025-08-11 DIAGNOSIS — R53.83 OTHER FATIGUE: Primary | ICD-10-CM

## 2025-08-11 LAB
ALBUMIN SERPL BCG-MCNC: 3.7 G/DL (ref 3.5–5.2)
ALP SERPL-CCNC: 71 U/L (ref 40–150)
ALT SERPL W P-5'-P-CCNC: 22 U/L (ref 0–50)
ANION GAP SERPL CALCULATED.3IONS-SCNC: 10 MMOL/L (ref 7–15)
AST SERPL W P-5'-P-CCNC: 28 U/L (ref 0–45)
BASOPHILS # BLD AUTO: 0.1 10E3/UL (ref 0–0.2)
BASOPHILS NFR BLD AUTO: 1 %
BILIRUB SERPL-MCNC: 0.2 MG/DL
BUN SERPL-MCNC: 15.8 MG/DL (ref 6–20)
CALCIUM SERPL-MCNC: 9.1 MG/DL (ref 8.8–10.4)
CHLORIDE SERPL-SCNC: 107 MMOL/L (ref 98–107)
CREAT SERPL-MCNC: 0.79 MG/DL (ref 0.51–0.95)
EGFRCR SERPLBLD CKD-EPI 2021: >90 ML/MIN/1.73M2
EOSINOPHIL # BLD AUTO: 0.7 10E3/UL (ref 0–0.7)
EOSINOPHIL NFR BLD AUTO: 9 %
ERYTHROCYTE [DISTWIDTH] IN BLOOD BY AUTOMATED COUNT: 15.2 % (ref 10–15)
GLUCOSE SERPL-MCNC: 89 MG/DL (ref 70–99)
HCO3 SERPL-SCNC: 23 MMOL/L (ref 22–29)
HCT VFR BLD AUTO: 38.3 % (ref 35–47)
HGB BLD-MCNC: 12.1 G/DL (ref 11.7–15.7)
IMM GRANULOCYTES # BLD: 0 10E3/UL
IMM GRANULOCYTES NFR BLD: 0 %
LYMPHOCYTES # BLD AUTO: 2.1 10E3/UL (ref 0.8–5.3)
LYMPHOCYTES NFR BLD AUTO: 29 %
MCH RBC QN AUTO: 28.3 PG (ref 26.5–33)
MCHC RBC AUTO-ENTMCNC: 31.6 G/DL (ref 31.5–36.5)
MCV RBC AUTO: 90 FL (ref 78–100)
MONOCYTES # BLD AUTO: 1 10E3/UL (ref 0–1.3)
MONOCYTES NFR BLD AUTO: 14 %
NEUTROPHILS # BLD AUTO: 3.4 10E3/UL (ref 1.6–8.3)
NEUTROPHILS NFR BLD AUTO: 47 %
PLATELET # BLD AUTO: 422 10E3/UL (ref 150–450)
POTASSIUM SERPL-SCNC: 3.9 MMOL/L (ref 3.4–5.3)
PROT SERPL-MCNC: 6.3 G/DL (ref 6.4–8.3)
RBC # BLD AUTO: 4.27 10E6/UL (ref 3.8–5.2)
SODIUM SERPL-SCNC: 140 MMOL/L (ref 135–145)
TSH SERPL DL<=0.005 MIU/L-ACNC: 1.85 UIU/ML (ref 0.3–4.2)
WBC # BLD AUTO: 7.3 10E3/UL (ref 4–11)

## 2025-08-11 PROCEDURE — 80053 COMPREHEN METABOLIC PANEL: CPT | Performed by: PHYSICIAN ASSISTANT

## 2025-08-11 PROCEDURE — 84443 ASSAY THYROID STIM HORMONE: CPT | Performed by: PHYSICIAN ASSISTANT

## 2025-08-11 PROCEDURE — 1125F AMNT PAIN NOTED PAIN PRSNT: CPT | Performed by: PHYSICIAN ASSISTANT

## 2025-08-11 PROCEDURE — 36415 COLL VENOUS BLD VENIPUNCTURE: CPT | Performed by: PHYSICIAN ASSISTANT

## 2025-08-11 PROCEDURE — 3075F SYST BP GE 130 - 139MM HG: CPT | Performed by: PHYSICIAN ASSISTANT

## 2025-08-11 PROCEDURE — 85025 COMPLETE CBC W/AUTO DIFF WBC: CPT | Performed by: PHYSICIAN ASSISTANT

## 2025-08-11 PROCEDURE — 99214 OFFICE O/P EST MOD 30 MIN: CPT | Performed by: PHYSICIAN ASSISTANT

## 2025-08-11 PROCEDURE — 3079F DIAST BP 80-89 MM HG: CPT | Performed by: PHYSICIAN ASSISTANT

## 2025-08-11 RX ORDER — CITALOPRAM HYDROBROMIDE 40 MG/1
TABLET ORAL
Qty: 90 TABLET | Refills: 1 | Status: SHIPPED | OUTPATIENT
Start: 2025-08-11

## 2025-08-11 ASSESSMENT — PAIN SCALES - GENERAL: PAINLEVEL_OUTOF10: MILD PAIN (2)

## 2025-08-27 ENCOUNTER — ALLIED HEALTH/NURSE VISIT (OUTPATIENT)
Dept: FAMILY MEDICINE | Facility: CLINIC | Age: 50
End: 2025-08-27
Payer: COMMERCIAL

## 2025-08-27 VITALS — HEART RATE: 65 BPM | DIASTOLIC BLOOD PRESSURE: 87 MMHG | SYSTOLIC BLOOD PRESSURE: 138 MMHG

## 2025-08-27 DIAGNOSIS — Z01.30 BP CHECK: Primary | ICD-10-CM

## 2025-08-27 PROCEDURE — 3075F SYST BP GE 130 - 139MM HG: CPT | Performed by: FAMILY MEDICINE

## 2025-08-27 PROCEDURE — 99207 PR NO CHARGE NURSE ONLY: CPT | Performed by: FAMILY MEDICINE

## 2025-08-27 PROCEDURE — 3079F DIAST BP 80-89 MM HG: CPT | Performed by: FAMILY MEDICINE

## (undated) DEVICE — SOL WATER IRRIG 1000ML BOTTLE 07139-09

## (undated) RX ORDER — FENTANYL CITRATE 50 UG/ML
INJECTION, SOLUTION INTRAMUSCULAR; INTRAVENOUS
Status: DISPENSED
Start: 2022-05-19